# Patient Record
Sex: FEMALE | Race: WHITE | Employment: FULL TIME | URBAN - METROPOLITAN AREA
[De-identification: names, ages, dates, MRNs, and addresses within clinical notes are randomized per-mention and may not be internally consistent; named-entity substitution may affect disease eponyms.]

---

## 2017-06-08 ENCOUNTER — HOSPITAL ENCOUNTER (OUTPATIENT)
Dept: CT IMAGING | Facility: MEDICAL CENTER | Age: 52
Discharge: HOME | End: 2017-06-08
Attending: PSYCHIATRY & NEUROLOGY | Admitting: PSYCHIATRY & NEUROLOGY

## 2017-06-08 DIAGNOSIS — M54.16 RADICULOPATHY OF LUMBAR REGION: ICD-10-CM

## 2017-06-08 DIAGNOSIS — M54.16 LUMBAR RADICULOPATHY: ICD-10-CM

## 2017-06-08 DIAGNOSIS — M47.817 FACET DEGENERATION OF LUMBOSACRAL REGION: ICD-10-CM

## 2018-07-20 ENCOUNTER — OFFICE VISIT (OUTPATIENT)
Dept: FAMILY MEDICINE CLINIC | Age: 53
End: 2018-07-20

## 2018-07-20 VITALS
RESPIRATION RATE: 16 BRPM | SYSTOLIC BLOOD PRESSURE: 126 MMHG | BODY MASS INDEX: 36.48 KG/M2 | HEART RATE: 80 BPM | TEMPERATURE: 99 F | WEIGHT: 185.8 LBS | DIASTOLIC BLOOD PRESSURE: 78 MMHG | HEIGHT: 60 IN

## 2018-07-20 DIAGNOSIS — Z76.89 ENCOUNTER TO ESTABLISH CARE: Primary | ICD-10-CM

## 2018-07-20 DIAGNOSIS — F33.1 MODERATE EPISODE OF RECURRENT MAJOR DEPRESSIVE DISORDER (HCC): ICD-10-CM

## 2018-07-20 DIAGNOSIS — R73.9 ELEVATED BLOOD SUGAR: ICD-10-CM

## 2018-07-20 DIAGNOSIS — F41.9 ANXIETY: ICD-10-CM

## 2018-07-20 DIAGNOSIS — Z12.11 ENCOUNTER FOR HEMOCCULT SCREENING: ICD-10-CM

## 2018-07-20 LAB — HBA1C MFR BLD: 5.6 %

## 2018-07-20 PROCEDURE — G8417 CALC BMI ABV UP PARAM F/U: HCPCS | Performed by: REGISTERED NURSE

## 2018-07-20 PROCEDURE — 99204 OFFICE O/P NEW MOD 45 MIN: CPT | Performed by: REGISTERED NURSE

## 2018-07-20 PROCEDURE — 3017F COLORECTAL CA SCREEN DOC REV: CPT | Performed by: REGISTERED NURSE

## 2018-07-20 PROCEDURE — 96160 PT-FOCUSED HLTH RISK ASSMT: CPT | Performed by: REGISTERED NURSE

## 2018-07-20 PROCEDURE — 4004F PT TOBACCO SCREEN RCVD TLK: CPT | Performed by: REGISTERED NURSE

## 2018-07-20 PROCEDURE — 83036 HEMOGLOBIN GLYCOSYLATED A1C: CPT | Performed by: REGISTERED NURSE

## 2018-07-20 PROCEDURE — G8427 DOCREV CUR MEDS BY ELIG CLIN: HCPCS | Performed by: REGISTERED NURSE

## 2018-07-20 RX ORDER — GABAPENTIN 600 MG/1
600 TABLET ORAL 3 TIMES DAILY
COMMUNITY
End: 2018-08-23

## 2018-07-20 RX ORDER — DIAZEPAM 5 MG/1
5 TABLET ORAL EVERY 6 HOURS PRN
COMMUNITY
End: 2020-06-19

## 2018-07-20 RX ORDER — DIPHENHYDRAMINE HCL 25 MG
25 TABLET ORAL EVERY 6 HOURS PRN
COMMUNITY
End: 2021-01-15 | Stop reason: SDUPTHER

## 2018-07-20 RX ORDER — BACLOFEN 20 MG/1
20 TABLET ORAL 3 TIMES DAILY
COMMUNITY
End: 2018-07-20 | Stop reason: SDUPTHER

## 2018-07-20 RX ORDER — HYDROCODONE BITARTRATE AND ACETAMINOPHEN 5; 325 MG/1; MG/1
1 TABLET ORAL EVERY 6 HOURS PRN
COMMUNITY
End: 2019-01-17 | Stop reason: ALTCHOICE

## 2018-07-20 RX ORDER — PROMETHAZINE HYDROCHLORIDE 25 MG/1
25 TABLET ORAL EVERY 6 HOURS PRN
COMMUNITY
End: 2019-03-07 | Stop reason: SDUPTHER

## 2018-07-20 RX ORDER — LORATADINE 10 MG/1
10 TABLET ORAL DAILY
COMMUNITY
End: 2021-01-15 | Stop reason: SDUPTHER

## 2018-07-20 RX ORDER — BACLOFEN 20 MG/1
20 TABLET ORAL 3 TIMES DAILY
Qty: 90 TABLET | Refills: 2 | Status: SHIPPED | OUTPATIENT
Start: 2018-07-20 | End: 2019-02-14 | Stop reason: SDUPTHER

## 2018-07-20 RX ORDER — PANTOPRAZOLE SODIUM 40 MG/1
40 TABLET, DELAYED RELEASE ORAL DAILY
COMMUNITY
End: 2019-03-28

## 2018-07-20 RX ORDER — HYDROCHLOROTHIAZIDE 25 MG/1
25 TABLET ORAL DAILY
COMMUNITY
End: 2019-06-04 | Stop reason: ALTCHOICE

## 2018-07-20 RX ORDER — IBUPROFEN 800 MG/1
800 TABLET ORAL EVERY 6 HOURS PRN
COMMUNITY
End: 2018-10-19 | Stop reason: SDUPTHER

## 2018-07-20 ASSESSMENT — PATIENT HEALTH QUESTIONNAIRE - PHQ9
2. FEELING DOWN, DEPRESSED OR HOPELESS: 3
1. LITTLE INTEREST OR PLEASURE IN DOING THINGS: 3
10. IF YOU CHECKED OFF ANY PROBLEMS, HOW DIFFICULT HAVE THESE PROBLEMS MADE IT FOR YOU TO DO YOUR WORK, TAKE CARE OF THINGS AT HOME, OR GET ALONG WITH OTHER PEOPLE: 3
SUM OF ALL RESPONSES TO PHQ9 QUESTIONS 1 & 2: 6
4. FEELING TIRED OR HAVING LITTLE ENERGY: 3
3. TROUBLE FALLING OR STAYING ASLEEP: 2
SUM OF ALL RESPONSES TO PHQ QUESTIONS 1-9: 22
8. MOVING OR SPEAKING SO SLOWLY THAT OTHER PEOPLE COULD HAVE NOTICED. OR THE OPPOSITE, BEING SO FIGETY OR RESTLESS THAT YOU HAVE BEEN MOVING AROUND A LOT MORE THAN USUAL: 0
5. POOR APPETITE OR OVEREATING: 3
9. THOUGHTS THAT YOU WOULD BE BETTER OFF DEAD, OR OF HURTING YOURSELF: 3
7. TROUBLE CONCENTRATING ON THINGS, SUCH AS READING THE NEWSPAPER OR WATCHING TELEVISION: 3
6. FEELING BAD ABOUT YOURSELF - OR THAT YOU ARE A FAILURE OR HAVE LET YOURSELF OR YOUR FAMILY DOWN: 2

## 2018-07-20 ASSESSMENT — ENCOUNTER SYMPTOMS
WHEEZING: 0
SHORTNESS OF BREATH: 0
CHEST TIGHTNESS: 0
COUGH: 0

## 2018-07-20 NOTE — PROGRESS NOTES
[facility]  Clinic Note    Date: 7/20/2018                                               Subjective/Objective:     Chief Complaint   Patient presents with    Established New Doctor     NEW PT ESTABLISH [facility], STATES SHE LEFT HER OLD DR, DR Tabby Flores ON BAD TERMS BUT HAS FRIENDS WHO COME HERE.  Anxiety     STATES SHE HAS SEVERE ANXIETY PROBLEMS AND HAS BEEN WITHOUT HER VALIUM FOR 2 WEEKS, SHE HAS A LOT GOING ON AND NOTHING SEEMS TO BE GOING RIGHT.  Diabetes     ROUTINE DM FOLLOW UP, NOT CURRENTLY ON MEDS FOR THIS. USED TO BE ON INVOKANA.  Depression     POSITIVE DEPRESSION SCREENING. STATES HAS BEEN AN ISSUE FOR ABOUT 40 YEARS. HER MOTHER KILLED HERSELF WHEN SHE WAS 5. SHE HAS NO PLANS OF SELF HARM AND FEELS SHE IS HANDLING LIFE AS WELL AS SHE CAN RIGHT NOW.  Other     DECLINES MAMMOGRAM, COLONOSCOPY, AND PAP AT THIS TIME. HPI Patient presents to [facility] care. Works at office jobs on and off,  jobs- unable to work currently due to back pain. Has 4 kids- 5 grand children. Lives alone in an apartment. Is being followed by spine specialist. May need surgery again. Patient has hx of anxiety and depression. Sever anxiety- has been on Valium- has been out for the past 2 weeks- no current signs of withdraw. Depression has been an issue for over 40 years. Mother committed suicide when patient was 5. Patient has no thoughts of hurting self or others. Does not act on them or has a plan. Has a lot going on in her life- needs back surgery again. Having issues with short and long term memory also. Requesting refill on Valium. .    Patient has hx of DM- was previous on Invokana, but not currently. Diabetes Mellitus Type II:  Home blood sugar records: 115-150 between meals. No significant episodes of hypoglycemia. No polyuria, polydipsia, visual changes, foot problems, GI upset. Diabetic diet compliance:  compliant most of the time,  Weight trend: increasing steadily. Current exercise: none. Eye exam current (within one year): no.     No results found for: LABA1C  No results found for: LABMICR, CREATININE  No results found for: ALT, AST  No components found for: CHLPL  No results found for: TRIG  No results found for: HDL  No results found for: LDLCALC    Hypertension:  Denies CP, SOB, visual changes, dizziness, palpitations or HA. She is not adherent to a low sodium diet. Blood pressure typically runs unknown outside of the office- does not check. There are no active problems to display for this patient.       Past Medical History:   Diagnosis Date    Anxiety     Bipolar disorder [facility]     CAD (coronary artery disease)     COPD (chronic obstructive pulmonary disease) ([facility] [facility]     Depression     Diabetes mellitus ([facility] [address] [facility]     Hypertension     Myocardial infarction     PTSD (post-traumatic stress disorder)        Past Surgical History:   Procedure Laterality Date    BACK SURGERY       SECTION      CHOLECYSTECTOMY      SPINAL FUSION      TOTAL HIP ARTHROPLASTY         Admission on 2016, Discharged on 2016   Component Date Value Ref Range Status    Carboxyhemoglobin 2016 7.3* 0.0 - 1.5 % Final    Comment:      0.0-1.5  (Smokers 1.5-5.0)         Family History   Problem Relation Age of Onset    Other Mother         MENTAL HEALTH     Heart Disease Father     Cancer Sister         BREAST        Current Outpatient Prescriptions   Medication Sig Dispense Refill    POTASSIUM CHLORIDE PO Take 10 mg by mouth      BUPROPION HCL PO Take 150 mg by mouth      diclofenac sodium 1 % GEL Apply 2 g topically 2 times daily      diphenhydrAMINE (DIPHENHIST) 25 MG tablet Take 25 mg by mouth every 6 hours as needed for Itching      hydrochlorothiazide (HYDRODIURIL) 25 MG tablet Take 25 mg by mouth daily      Ipratropium-Albuterol (COMBIVENT RESPIMAT IN) Inhale into the lungs      Albuterol (VENTOLIN IN) Inhale into the lungs      Emollient (LIBRA) cream Apply topically as needed for Dry Skin Apply topically as needed.  ibuprofen (ADVIL;MOTRIN) 800 MG tablet Take 800 mg by mouth every 6 hours as needed for Pain      Butalbital-APAP-Caffeine (FIORICET PO) Take by mouth      gabapentin (NEURONTIN) 600 MG tablet Take 600 mg by mouth 3 times daily. Arlice Kava diazepam (VALIUM) 5 MG tablet Take 5 mg by mouth every 6 hours as needed for Anxiety. Arlice Kava Multiple Vitamin (MULTI-VITAMIN PO) Take by mouth      baclofen (LIORESAL) 20 MG tablet Take 20 mg by mouth 3 times daily      promethazine (PHENERGAN) 25 MG tablet Take 25 mg by mouth every 6 hours as needed for Nausea      aspirin 81 MG tablet Take 81 mg by mouth daily      loratadine (CLARITIN) 10 MG tablet Take 10 mg by mouth daily      FERROUS SULFATE PO Take 325 mg by mouth      HYDROcodone-acetaminophen (NORCO) 5-325 MG per tablet Take 1 tablet by mouth every 6 hours as needed for Pain. .       No current facility-administered medications for this visit. Allergies   Allergen Reactions    Abilify [Aripiprazole]     Celebrex [Celecoxib] Swelling    Imdur [Isosorbide Dinitrate]     Lamictal [Lamotrigine] Rash    Ultram [Tramadol] Nausea And Vomiting       Review of Systems   Constitutional: Negative for chills, diaphoresis, fatigue and fever. Respiratory: Negative for cough, chest tightness, shortness of breath and wheezing. Cardiovascular: Negative for chest pain and palpitations. Gastrointestinal: Negative for abdominal distention, abdominal pain, blood in stool, constipation, diarrhea, nausea and vomiting. Musculoskeletal: Positive for back pain (chronic issue). Neurological: Positive for weakness. Negative for dizziness, light-headedness, numbness and headaches. Psychiatric/Behavioral: Positive for dysphoric mood and self-injury. Negative for sleep disturbance and suicidal ideas. The patient is nervous/anxious. All other systems reviewed and are negative.       Vitals:  BP 126/78 (Site: Left Arm, Position: Sitting, Cuff Size: Medium Adult)   Pulse 80   Temp 99 °F (37.2 °C) (Oral)   Resp 16   Ht 5' (1.524 m)   Wt 185 lb 12.8 oz (84.3 kg) Comment: SHOES ON  LMP 03/09/2016 (Approximate)   Breastfeeding? No   BMI 36.29 kg/m²     Physical Exam   Constitutional: She is oriented to person, place, and time. She appears well-developed and well-nourished. HENT:   Head: Normocephalic and atraumatic. Right Ear: Tympanic membrane, external ear and ear canal normal.   Left Ear: Tympanic membrane, external ear and ear canal normal.   Nose: Nose normal.   Mouth/Throat: Uvula is midline, oropharynx is clear and moist and mucous membranes are normal.   Eyes: Conjunctivae and EOM are normal. Pupils are equal, round, and reactive to light. Neck: Normal range of motion. Neck supple. No thyromegaly present. Cardiovascular: Normal rate, regular rhythm, normal heart sounds and intact distal pulses. Pulmonary/Chest: Effort normal and breath sounds normal.   Lymphadenopathy:     She has no cervical adenopathy. Neurological: She is alert and oriented to person, place, and time. Skin: Skin is warm and dry. Psychiatric: She has a normal mood and affect. Her speech is normal and behavior is normal. Judgment and thought content normal.   Nursing note and vitals reviewed. Assessment/Plan     1. Encounter to establish care  Will establish with Dr. Thomasina Angelucci for GYN care. - Sydnie Sabillon MD    2. Moderate episode of recurrent major depressive disorder [facility])  Educated patient that I do not prescribe controlled substances, but scheduled patient with [address] [place] [address] NP for further evaluation and management. Given counseling services handout. Does not need refills on other medications. 3. Anxiety  See above.     4. Elevated blood sugar  A1c is 5.6- will hold on Invokana and other DM medication at this point due to good diet control.  - POCT glycosylated

## 2018-07-28 ASSESSMENT — ENCOUNTER SYMPTOMS
BACK PAIN: 1
NAUSEA: 0
BLOOD IN STOOL: 0
VOMITING: 0
ABDOMINAL PAIN: 0
DIARRHEA: 0
CONSTIPATION: 0
ABDOMINAL DISTENTION: 0

## 2018-08-23 ENCOUNTER — OFFICE VISIT (OUTPATIENT)
Dept: FAMILY MEDICINE CLINIC | Age: 53
End: 2018-08-23

## 2018-08-23 VITALS
BODY MASS INDEX: 37.11 KG/M2 | DIASTOLIC BLOOD PRESSURE: 80 MMHG | WEIGHT: 189 LBS | HEIGHT: 60 IN | SYSTOLIC BLOOD PRESSURE: 124 MMHG | TEMPERATURE: 98.3 F | RESPIRATION RATE: 16 BRPM | HEART RATE: 80 BPM

## 2018-08-23 DIAGNOSIS — Z23 NEED FOR PROPHYLACTIC VACCINATION AND INOCULATION AGAINST VARICELLA: ICD-10-CM

## 2018-08-23 DIAGNOSIS — Z12.31 ENCOUNTER FOR SCREENING MAMMOGRAM FOR BREAST CANCER: ICD-10-CM

## 2018-08-23 DIAGNOSIS — Z13.220 SCREENING FOR HYPERLIPIDEMIA: ICD-10-CM

## 2018-08-23 DIAGNOSIS — Z01.818 PRE-OP EVALUATION: Primary | ICD-10-CM

## 2018-08-23 DIAGNOSIS — M25.512 ACUTE PAIN OF LEFT SHOULDER: ICD-10-CM

## 2018-08-23 DIAGNOSIS — Z13.31 POSITIVE DEPRESSION SCREENING: ICD-10-CM

## 2018-08-23 DIAGNOSIS — Z23 NEED FOR PROPHYLACTIC VACCINATION AGAINST STREPTOCOCCUS PNEUMONIAE (PNEUMOCOCCUS): ICD-10-CM

## 2018-08-23 DIAGNOSIS — K43.2 INCISIONAL HERNIA, WITHOUT OBSTRUCTION OR GANGRENE: ICD-10-CM

## 2018-08-23 PROBLEM — K21.9 GASTROESOPHAGEAL REFLUX DISEASE WITHOUT ESOPHAGITIS: Status: ACTIVE | Noted: 2018-08-23

## 2018-08-23 PROBLEM — F60.9 PERSONALITY DISORDER (HCC): Status: ACTIVE | Noted: 2018-08-23

## 2018-08-23 PROBLEM — M51.379 DEGENERATION OF LUMBOSACRAL INTERVERTEBRAL DISC: Status: ACTIVE | Noted: 2017-04-04

## 2018-08-23 PROBLEM — M47.816 LUMBAR SPONDYLOSIS: Status: ACTIVE | Noted: 2017-07-21

## 2018-08-23 PROBLEM — G57.01: Status: ACTIVE | Noted: 2017-09-12

## 2018-08-23 PROBLEM — F31.9 BIPOLAR DISORDER (HCC): Status: ACTIVE | Noted: 2018-08-23

## 2018-08-23 PROBLEM — M54.16 LUMBAR RADICULOPATHY: Status: ACTIVE | Noted: 2017-04-04

## 2018-08-23 PROBLEM — M47.817 LUMBOSACRAL SPONDYLOSIS WITHOUT MYELOPATHY: Status: ACTIVE | Noted: 2017-04-04

## 2018-08-23 PROBLEM — F41.9 ANXIETY: Status: ACTIVE | Noted: 2018-08-23

## 2018-08-23 PROBLEM — E11.9 DIABETES (HCC): Status: ACTIVE | Noted: 2018-08-23

## 2018-08-23 PROBLEM — I10 HYPERTENSION: Status: ACTIVE | Noted: 2018-08-23

## 2018-08-23 PROBLEM — J41.0 SIMPLE CHRONIC BRONCHITIS (HCC): Status: ACTIVE | Noted: 2018-08-23

## 2018-08-23 PROBLEM — M51.37 DEGENERATION OF LUMBOSACRAL INTERVERTEBRAL DISC: Status: ACTIVE | Noted: 2017-04-04

## 2018-08-23 PROBLEM — M53.3 SACROILIAC JOINT DYSFUNCTION OF LEFT SIDE: Status: ACTIVE | Noted: 2017-01-06

## 2018-08-23 PROBLEM — M51.17 INTERVERTEBRAL DISC DISORDER WITH RADICULOPATHY OF LUMBOSACRAL REGION: Status: ACTIVE | Noted: 2017-07-21

## 2018-08-23 PROBLEM — F43.10 PTSD (POST-TRAUMATIC STRESS DISORDER): Status: ACTIVE | Noted: 2018-08-23

## 2018-08-23 PROBLEM — M54.17 LUMBOSACRAL RADICULOPATHY: Status: ACTIVE | Noted: 2017-09-12

## 2018-08-23 LAB
A/G RATIO: 1.6 (ref 1.1–2.2)
ALBUMIN SERPL-MCNC: 4.6 G/DL (ref 3.4–5)
ALP BLD-CCNC: 137 U/L (ref 40–129)
ALT SERPL-CCNC: 17 U/L (ref 10–40)
ANION GAP SERPL CALCULATED.3IONS-SCNC: 15 MMOL/L (ref 3–16)
APTT: 37 SEC (ref 26–36)
AST SERPL-CCNC: 15 U/L (ref 15–37)
BILIRUB SERPL-MCNC: <0.2 MG/DL (ref 0–1)
BILIRUBIN, POC: NORMAL
BLOOD URINE, POC: NORMAL
BUN BLDV-MCNC: 15 MG/DL (ref 7–20)
CALCIUM SERPL-MCNC: 9.8 MG/DL (ref 8.3–10.6)
CHLORIDE BLD-SCNC: 98 MMOL/L (ref 99–110)
CLARITY, POC: CLEAR
CO2: 25 MMOL/L (ref 21–32)
COLOR, POC: NORMAL
CREAT SERPL-MCNC: 0.9 MG/DL (ref 0.6–1.1)
GFR AFRICAN AMERICAN: >60
GFR NON-AFRICAN AMERICAN: >60
GLOBULIN: 2.9 G/DL
GLUCOSE BLD-MCNC: 87 MG/DL (ref 70–99)
GLUCOSE URINE, POC: NORMAL
INR BLD: 0.94 (ref 0.86–1.14)
KETONES, POC: NORMAL
LEUKOCYTE EST, POC: NORMAL
NITRITE, POC: NORMAL
PH, POC: 7.5
POTASSIUM SERPL-SCNC: 4.6 MMOL/L (ref 3.5–5.1)
PROTEIN, POC: NORMAL
PROTHROMBIN TIME: 10.7 SEC (ref 9.8–13)
SODIUM BLD-SCNC: 138 MMOL/L (ref 136–145)
SPECIFIC GRAVITY, POC: 1.01
TOTAL PROTEIN: 7.5 G/DL (ref 6.4–8.2)
UROBILINOGEN, POC: 0.2

## 2018-08-23 PROCEDURE — 36415 COLL VENOUS BLD VENIPUNCTURE: CPT | Performed by: REGISTERED NURSE

## 2018-08-23 PROCEDURE — G8417 CALC BMI ABV UP PARAM F/U: HCPCS | Performed by: REGISTERED NURSE

## 2018-08-23 PROCEDURE — 81002 URINALYSIS NONAUTO W/O SCOPE: CPT | Performed by: REGISTERED NURSE

## 2018-08-23 PROCEDURE — G8431 POS CLIN DEPRES SCRN F/U DOC: HCPCS | Performed by: REGISTERED NURSE

## 2018-08-23 PROCEDURE — 96160 PT-FOCUSED HLTH RISK ASSMT: CPT | Performed by: REGISTERED NURSE

## 2018-08-23 PROCEDURE — 99244 OFF/OP CNSLTJ NEW/EST MOD 40: CPT | Performed by: REGISTERED NURSE

## 2018-08-23 PROCEDURE — 3017F COLORECTAL CA SCREEN DOC REV: CPT | Performed by: REGISTERED NURSE

## 2018-08-23 PROCEDURE — G8427 DOCREV CUR MEDS BY ELIG CLIN: HCPCS | Performed by: REGISTERED NURSE

## 2018-08-23 ASSESSMENT — PATIENT HEALTH QUESTIONNAIRE - PHQ9
7. TROUBLE CONCENTRATING ON THINGS, SUCH AS READING THE NEWSPAPER OR WATCHING TELEVISION: 3
SUM OF ALL RESPONSES TO PHQ QUESTIONS 1-9: 18
1. LITTLE INTEREST OR PLEASURE IN DOING THINGS: 3
4. FEELING TIRED OR HAVING LITTLE ENERGY: 3
SUM OF ALL RESPONSES TO PHQ QUESTIONS 1-9: 18
6. FEELING BAD ABOUT YOURSELF - OR THAT YOU ARE A FAILURE OR HAVE LET YOURSELF OR YOUR FAMILY DOWN: 3
SUM OF ALL RESPONSES TO PHQ9 QUESTIONS 1 & 2: 6
3. TROUBLE FALLING OR STAYING ASLEEP: 3
2. FEELING DOWN, DEPRESSED OR HOPELESS: 3
8. MOVING OR SPEAKING SO SLOWLY THAT OTHER PEOPLE COULD HAVE NOTICED. OR THE OPPOSITE, BEING SO FIGETY OR RESTLESS THAT YOU HAVE BEEN MOVING AROUND A LOT MORE THAN USUAL: 0
9. THOUGHTS THAT YOU WOULD BE BETTER OFF DEAD, OR OF HURTING YOURSELF: 0
10. IF YOU CHECKED OFF ANY PROBLEMS, HOW DIFFICULT HAVE THESE PROBLEMS MADE IT FOR YOU TO DO YOUR WORK, TAKE CARE OF THINGS AT HOME, OR GET ALONG WITH OTHER PEOPLE: 3
5. POOR APPETITE OR OVEREATING: 0

## 2018-08-23 ASSESSMENT — ENCOUNTER SYMPTOMS
CHEST TIGHTNESS: 0
NAUSEA: 0
SHORTNESS OF BREATH: 0
VOMITING: 0
BACK PAIN: 1
CONSTIPATION: 0
COUGH: 0
WHEEZING: 0
DIARRHEA: 0

## 2018-08-23 NOTE — PROGRESS NOTES
with psychiatrist.    3. Need for prophylactic vaccination against Streptococcus pneumoniae (pneumococcus)  Due.  - pneumococcal 13-valent conjugate (PREVNAR) SUSP inj; Inject 0.5 mLs into the muscle once for 1 dose  Dispense: 0.5 mL; Refill: 0    4. Need for prophylactic vaccination and inoculation against varicella  Due  - zoster recombinant adjuvanted vaccine (SHINGRIX) 50 MCG SUSR injection; Inject 0.5 mLs into the muscle once for 1 dose 50 MCG IM then repeat 2-6 months. Dispense: 1 each; Refill: 1    5. Encounter for screening mammogram for breast cancer  Due.  - [facility] Digital Screen Bilateral [[id number]]; Future    6. Screening for hyperlipidemia  Due.    7. Incisional hernia, without obstruction or gangrere  Suspect incisional hernia from previous surgery. Will follow up with general surgery if gets worse- will wait till after back surgery. 8. Acute pain of left shoulder. Given Rx for Bio med cream #5 and home exercises to complete. Will consider imaging if no relief or pain persists. Orders Placed This Encounter   Procedures    Urine Culture     Standing Status:   Future     Number of Occurrences:   1     Standing Expiration Date:   8/23/2019     Order Specific Question:   Specify (ex-cath, midstream, cysto, etc)? Answer:   MIDSTREAM    MRSA SCREENING CULTURE ONLY    YEMI Digital Screen Bilateral [[id number]]     Standing Status:   Future     Standing Expiration Date:   8/23/2019    Comprehensive Metabolic Panel     Standing Status:   Future     Number of Occurrences:   1     Standing Expiration Date:   8/23/2019    APTT     Standing Status:   Future     Number of Occurrences:   1     Standing Expiration Date:   8/23/2019     Order Specific Question:   Daily Heparin Dose? Answer:   0    Protime-INR     Standing Status:   Future     Number of Occurrences:   1     Standing Expiration Date:   8/23/2019     Order Specific Question:   Daily Coumadin Dose?      Answer:   0    POCT Urinalysis no Micro

## 2018-08-24 LAB — URINE CULTURE, ROUTINE: NORMAL

## 2018-08-24 ASSESSMENT — ENCOUNTER SYMPTOMS
ABDOMINAL PAIN: 1
ABDOMINAL DISTENTION: 0
BLOOD IN STOOL: 0
RECTAL PAIN: 0
ANAL BLEEDING: 0

## 2018-08-25 LAB — MRSA CULTURE ONLY: NORMAL

## 2018-10-19 RX ORDER — IBUPROFEN 800 MG/1
800 TABLET ORAL EVERY 6 HOURS PRN
Qty: 30 TABLET | Refills: 0 | Status: SHIPPED | OUTPATIENT
Start: 2018-10-19 | End: 2019-02-14 | Stop reason: SDUPTHER

## 2018-10-19 RX ORDER — OMEPRAZOLE 40 MG/1
40 CAPSULE, DELAYED RELEASE ORAL DAILY
Qty: 30 CAPSULE | Refills: 0 | Status: SHIPPED | OUTPATIENT
Start: 2018-10-19 | End: 2018-11-18 | Stop reason: SDUPTHER

## 2018-10-19 RX ORDER — ALBUTEROL SULFATE 90 UG/1
2 AEROSOL, METERED RESPIRATORY (INHALATION) EVERY 6 HOURS PRN
Qty: 1 INHALER | Refills: 0 | Status: SHIPPED | OUTPATIENT
Start: 2018-10-19 | End: 2018-11-19 | Stop reason: SDUPTHER

## 2018-10-19 RX ORDER — BUPROPION HYDROCHLORIDE 150 MG/1
150 TABLET ORAL EVERY MORNING
Qty: 30 TABLET | Refills: 0 | Status: SHIPPED | OUTPATIENT
Start: 2018-10-19 | End: 2018-11-18 | Stop reason: SDUPTHER

## 2018-10-19 NOTE — TELEPHONE ENCOUNTER
Patient requesting refills of  Bupropion 150 mg 1 QD  Free style  light test strips # 100 testing tid  Combivent  Diclofenac gel  Ibuprofen 800 mg tid  Omeprazole 40 mg  Rosa cream  Ventolin      All to go to [facility] on [address]

## 2018-11-19 RX ORDER — OMEPRAZOLE 40 MG/1
CAPSULE, DELAYED RELEASE ORAL
Qty: 30 CAPSULE | Refills: 0 | Status: SHIPPED | OUTPATIENT
Start: 2018-11-19 | End: 2019-01-17 | Stop reason: SDUPTHER

## 2018-11-19 RX ORDER — IPRATROPIUM/ALBUTEROL SULFATE 20-100 MCG
1 MIST INHALER (GRAM) INHALATION EVERY 6 HOURS PRN
Qty: 1 INHALER | Refills: 0 | Status: SHIPPED | OUTPATIENT
Start: 2018-11-19 | End: 2019-01-17 | Stop reason: SDUPTHER

## 2018-11-19 RX ORDER — BUPROPION HYDROCHLORIDE 150 MG/1
TABLET ORAL
Qty: 30 TABLET | Refills: 0 | Status: SHIPPED | OUTPATIENT
Start: 2018-11-19 | End: 2019-01-17 | Stop reason: SDUPTHER

## 2019-01-07 ENCOUNTER — TELEPHONE (OUTPATIENT)
Dept: FAMILY MEDICINE CLINIC | Age: 54
End: 2019-01-07

## 2019-01-07 DIAGNOSIS — M47.817 LUMBOSACRAL SPONDYLOSIS WITHOUT MYELOPATHY: Primary | ICD-10-CM

## 2019-01-17 ENCOUNTER — OFFICE VISIT (OUTPATIENT)
Dept: FAMILY MEDICINE CLINIC | Age: 54
End: 2019-01-17
Payer: COMMERCIAL

## 2019-01-17 VITALS
BODY MASS INDEX: 37.11 KG/M2 | WEIGHT: 189 LBS | HEIGHT: 60 IN | DIASTOLIC BLOOD PRESSURE: 74 MMHG | SYSTOLIC BLOOD PRESSURE: 106 MMHG

## 2019-01-17 DIAGNOSIS — M54.16 LUMBAR RADICULOPATHY: ICD-10-CM

## 2019-01-17 DIAGNOSIS — F41.9 ANXIETY: ICD-10-CM

## 2019-01-17 DIAGNOSIS — M54.41 ACUTE BILATERAL LOW BACK PAIN WITH BILATERAL SCIATICA: Primary | ICD-10-CM

## 2019-01-17 DIAGNOSIS — R09.89 THROAT FULLNESS: ICD-10-CM

## 2019-01-17 DIAGNOSIS — E11.69 DIABETES MELLITUS TYPE 2 IN OBESE (HCC): ICD-10-CM

## 2019-01-17 DIAGNOSIS — M54.42 ACUTE BILATERAL LOW BACK PAIN WITH BILATERAL SCIATICA: Primary | ICD-10-CM

## 2019-01-17 DIAGNOSIS — E66.9 DIABETES MELLITUS TYPE 2 IN OBESE (HCC): ICD-10-CM

## 2019-01-17 DIAGNOSIS — F31.70 BIPOLAR AFFECTIVE DISORDER IN REMISSION (HCC): ICD-10-CM

## 2019-01-17 DIAGNOSIS — J43.2 CENTRILOBULAR EMPHYSEMA (HCC): ICD-10-CM

## 2019-01-17 PROCEDURE — 4004F PT TOBACCO SCREEN RCVD TLK: CPT | Performed by: FAMILY MEDICINE

## 2019-01-17 PROCEDURE — G8484 FLU IMMUNIZE NO ADMIN: HCPCS | Performed by: FAMILY MEDICINE

## 2019-01-17 PROCEDURE — 3023F SPIROM DOC REV: CPT | Performed by: FAMILY MEDICINE

## 2019-01-17 PROCEDURE — G8417 CALC BMI ABV UP PARAM F/U: HCPCS | Performed by: FAMILY MEDICINE

## 2019-01-17 PROCEDURE — 99214 OFFICE O/P EST MOD 30 MIN: CPT | Performed by: FAMILY MEDICINE

## 2019-01-17 PROCEDURE — 2022F DILAT RTA XM EVC RTNOPTHY: CPT | Performed by: FAMILY MEDICINE

## 2019-01-17 PROCEDURE — 3017F COLORECTAL CA SCREEN DOC REV: CPT | Performed by: FAMILY MEDICINE

## 2019-01-17 PROCEDURE — 3046F HEMOGLOBIN A1C LEVEL >9.0%: CPT | Performed by: FAMILY MEDICINE

## 2019-01-17 PROCEDURE — G8427 DOCREV CUR MEDS BY ELIG CLIN: HCPCS | Performed by: FAMILY MEDICINE

## 2019-01-17 PROCEDURE — G8926 SPIRO NO PERF OR DOC: HCPCS | Performed by: FAMILY MEDICINE

## 2019-01-17 RX ORDER — OMEPRAZOLE 40 MG/1
CAPSULE, DELAYED RELEASE ORAL
Qty: 30 CAPSULE | Refills: 5 | Status: SHIPPED | OUTPATIENT
Start: 2019-01-17 | End: 2019-03-07

## 2019-01-17 RX ORDER — HYDROCODONE BITARTRATE AND ACETAMINOPHEN 5; 325 MG/1; MG/1
1 TABLET ORAL EVERY 6 HOURS PRN
Qty: 28 TABLET | Refills: 0 | Status: SHIPPED | OUTPATIENT
Start: 2019-01-17 | End: 2019-03-04 | Stop reason: SDUPTHER

## 2019-01-17 RX ORDER — BUPROPION HYDROCHLORIDE 150 MG/1
TABLET ORAL
Qty: 30 TABLET | Refills: 5 | Status: SHIPPED | OUTPATIENT
Start: 2019-01-17 | End: 2019-03-07

## 2019-01-17 RX ORDER — DIAZEPAM 5 MG/1
5 TABLET ORAL EVERY 6 HOURS PRN
Status: CANCELLED | OUTPATIENT
Start: 2019-01-17

## 2019-01-17 RX ORDER — ALBUTEROL SULFATE 90 UG/1
AEROSOL, METERED RESPIRATORY (INHALATION)
Qty: 8.5 INHALER | Refills: 5 | Status: SHIPPED | OUTPATIENT
Start: 2019-01-17 | End: 2019-07-12 | Stop reason: SDUPTHER

## 2019-01-17 RX ORDER — PREDNISONE 10 MG/1
TABLET ORAL
Qty: 25 TABLET | Refills: 0 | Status: SHIPPED | OUTPATIENT
Start: 2019-01-17 | End: 2019-02-26 | Stop reason: ALTCHOICE

## 2019-02-05 RX ORDER — BLOOD-GLUCOSE METER
KIT MISCELLANEOUS
Qty: 100 STRIP | Refills: 0 | OUTPATIENT
Start: 2019-02-05

## 2019-02-14 ENCOUNTER — TELEPHONE (OUTPATIENT)
Dept: FAMILY MEDICINE CLINIC | Age: 54
End: 2019-02-14

## 2019-02-14 RX ORDER — POTASSIUM CHLORIDE 750 MG/1
10 TABLET, EXTENDED RELEASE ORAL DAILY
Qty: 30 TABLET | Refills: 2 | Status: SHIPPED | OUTPATIENT
Start: 2019-02-14 | End: 2019-06-04 | Stop reason: ALTCHOICE

## 2019-02-14 RX ORDER — BACLOFEN 20 MG/1
20 TABLET ORAL 3 TIMES DAILY PRN
Qty: 90 TABLET | Refills: 2 | Status: SHIPPED | OUTPATIENT
Start: 2019-02-14 | End: 2019-05-15

## 2019-02-14 RX ORDER — IBUPROFEN 800 MG/1
800 TABLET ORAL EVERY 8 HOURS PRN
Qty: 90 TABLET | Refills: 2 | Status: SHIPPED | OUTPATIENT
Start: 2019-02-14 | End: 2019-06-04 | Stop reason: SDUPTHER

## 2019-02-26 ENCOUNTER — OFFICE VISIT (OUTPATIENT)
Dept: ENT CLINIC | Age: 54
End: 2019-02-26
Payer: COMMERCIAL

## 2019-02-26 VITALS — HEART RATE: 71 BPM | SYSTOLIC BLOOD PRESSURE: 124 MMHG | DIASTOLIC BLOOD PRESSURE: 68 MMHG | OXYGEN SATURATION: 96 %

## 2019-02-26 DIAGNOSIS — R13.19 ESOPHAGEAL DYSPHAGIA: Primary | ICD-10-CM

## 2019-02-26 DIAGNOSIS — G47.33 OBSTRUCTIVE SLEEP APNEA SYNDROME: ICD-10-CM

## 2019-02-26 DIAGNOSIS — J30.9 ALLERGIC SINUSITIS: ICD-10-CM

## 2019-02-26 PROCEDURE — G8427 DOCREV CUR MEDS BY ELIG CLIN: HCPCS | Performed by: OTOLARYNGOLOGY

## 2019-02-26 PROCEDURE — G8417 CALC BMI ABV UP PARAM F/U: HCPCS | Performed by: OTOLARYNGOLOGY

## 2019-02-26 PROCEDURE — G8484 FLU IMMUNIZE NO ADMIN: HCPCS | Performed by: OTOLARYNGOLOGY

## 2019-02-26 PROCEDURE — 3017F COLORECTAL CA SCREEN DOC REV: CPT | Performed by: OTOLARYNGOLOGY

## 2019-02-26 PROCEDURE — 4004F PT TOBACCO SCREEN RCVD TLK: CPT | Performed by: OTOLARYNGOLOGY

## 2019-02-26 PROCEDURE — 99203 OFFICE O/P NEW LOW 30 MIN: CPT | Performed by: OTOLARYNGOLOGY

## 2019-02-26 RX ORDER — MONTELUKAST SODIUM 10 MG/1
10 TABLET ORAL NIGHTLY
Qty: 30 TABLET | Refills: 1 | Status: SHIPPED | OUTPATIENT
Start: 2019-02-26 | End: 2019-04-27 | Stop reason: SDUPTHER

## 2019-02-26 ASSESSMENT — ENCOUNTER SYMPTOMS
SINUS PRESSURE: 0
VOICE CHANGE: 0
SORE THROAT: 1
RESPIRATORY NEGATIVE: 1
TROUBLE SWALLOWING: 1
EYES NEGATIVE: 1
ALLERGIC/IMMUNOLOGIC NEGATIVE: 1
FACIAL SWELLING: 0
SINUS PAIN: 0
RHINORRHEA: 1

## 2019-03-04 ENCOUNTER — TELEPHONE (OUTPATIENT)
Dept: ORTHOPEDIC SURGERY | Age: 54
End: 2019-03-04

## 2019-03-04 ENCOUNTER — OFFICE VISIT (OUTPATIENT)
Dept: ORTHOPEDIC SURGERY | Age: 54
End: 2019-03-04
Payer: COMMERCIAL

## 2019-03-04 VITALS
DIASTOLIC BLOOD PRESSURE: 91 MMHG | HEART RATE: 84 BPM | BODY MASS INDEX: 37.69 KG/M2 | WEIGHT: 192 LBS | SYSTOLIC BLOOD PRESSURE: 129 MMHG | HEIGHT: 60 IN

## 2019-03-04 DIAGNOSIS — M25.512 LEFT SHOULDER PAIN, UNSPECIFIED CHRONICITY: Primary | ICD-10-CM

## 2019-03-04 DIAGNOSIS — M75.102 TEAR OF LEFT ROTATOR CUFF, UNSPECIFIED TEAR EXTENT: ICD-10-CM

## 2019-03-04 PROCEDURE — 3017F COLORECTAL CA SCREEN DOC REV: CPT | Performed by: ORTHOPAEDIC SURGERY

## 2019-03-04 PROCEDURE — 4004F PT TOBACCO SCREEN RCVD TLK: CPT | Performed by: ORTHOPAEDIC SURGERY

## 2019-03-04 PROCEDURE — G8427 DOCREV CUR MEDS BY ELIG CLIN: HCPCS | Performed by: ORTHOPAEDIC SURGERY

## 2019-03-04 PROCEDURE — 99203 OFFICE O/P NEW LOW 30 MIN: CPT | Performed by: ORTHOPAEDIC SURGERY

## 2019-03-04 PROCEDURE — G8417 CALC BMI ABV UP PARAM F/U: HCPCS | Performed by: ORTHOPAEDIC SURGERY

## 2019-03-04 PROCEDURE — G8484 FLU IMMUNIZE NO ADMIN: HCPCS | Performed by: ORTHOPAEDIC SURGERY

## 2019-03-04 RX ORDER — HYDROCODONE BITARTRATE AND ACETAMINOPHEN 5; 325 MG/1; MG/1
1 TABLET ORAL EVERY 6 HOURS PRN
Qty: 28 TABLET | Refills: 0 | Status: SHIPPED | OUTPATIENT
Start: 2019-03-04 | End: 2019-03-25 | Stop reason: SDUPTHER

## 2019-03-07 ENCOUNTER — TELEPHONE (OUTPATIENT)
Dept: FAMILY MEDICINE CLINIC | Age: 54
End: 2019-03-07

## 2019-03-07 ENCOUNTER — OFFICE VISIT (OUTPATIENT)
Dept: FAMILY MEDICINE CLINIC | Age: 54
End: 2019-03-07
Payer: COMMERCIAL

## 2019-03-07 VITALS
HEART RATE: 74 BPM | BODY MASS INDEX: 38.28 KG/M2 | SYSTOLIC BLOOD PRESSURE: 114 MMHG | DIASTOLIC BLOOD PRESSURE: 72 MMHG | RESPIRATION RATE: 16 BRPM | WEIGHT: 195 LBS | HEIGHT: 60 IN

## 2019-03-07 DIAGNOSIS — G89.29 CHRONIC LEFT SHOULDER PAIN: ICD-10-CM

## 2019-03-07 DIAGNOSIS — E78.00 HYPERCHOLESTEREMIA: ICD-10-CM

## 2019-03-07 DIAGNOSIS — L30.9 ECZEMA, UNSPECIFIED TYPE: ICD-10-CM

## 2019-03-07 DIAGNOSIS — F51.04 CHRONIC INSOMNIA: ICD-10-CM

## 2019-03-07 DIAGNOSIS — R61 NIGHT SWEATS: ICD-10-CM

## 2019-03-07 DIAGNOSIS — Z86.2 HISTORY OF IRON DEFICIENCY ANEMIA: ICD-10-CM

## 2019-03-07 DIAGNOSIS — F32.4 MAJOR DEPRESSIVE DISORDER IN PARTIAL REMISSION, UNSPECIFIED WHETHER RECURRENT (HCC): Primary | ICD-10-CM

## 2019-03-07 DIAGNOSIS — M25.512 CHRONIC LEFT SHOULDER PAIN: ICD-10-CM

## 2019-03-07 DIAGNOSIS — R11.0 NAUSEA: ICD-10-CM

## 2019-03-07 DIAGNOSIS — K08.89 ODONTALGIA: ICD-10-CM

## 2019-03-07 LAB
A/G RATIO: 1.5 (ref 1.1–2.2)
ALBUMIN SERPL-MCNC: 4.4 G/DL (ref 3.4–5)
ALP BLD-CCNC: 142 U/L (ref 40–129)
ALT SERPL-CCNC: 20 U/L (ref 10–40)
AMPHETAMINE SCREEN, URINE: ABNORMAL
ANION GAP SERPL CALCULATED.3IONS-SCNC: 16 MMOL/L (ref 3–16)
AST SERPL-CCNC: 20 U/L (ref 15–37)
BARBITURATE SCREEN, URINE: ABNORMAL
BASOPHILS ABSOLUTE: 0.1 K/UL (ref 0–0.2)
BASOPHILS RELATIVE PERCENT: 1.3 %
BENZODIAZEPINE SCREEN, URINE: ABNORMAL
BILIRUB SERPL-MCNC: 0.3 MG/DL (ref 0–1)
BUN BLDV-MCNC: 12 MG/DL (ref 7–20)
BUPRENORPHINE URINE: ABNORMAL
CALCIUM SERPL-MCNC: 10 MG/DL (ref 8.3–10.6)
CHLORIDE BLD-SCNC: 100 MMOL/L (ref 99–110)
CHOLESTEROL, TOTAL: 261 MG/DL (ref 0–199)
CO2: 24 MMOL/L (ref 21–32)
COCAINE METABOLITE SCREEN URINE: ABNORMAL
CREAT SERPL-MCNC: <0.5 MG/DL (ref 0.6–1.1)
EOSINOPHILS ABSOLUTE: 0.3 K/UL (ref 0–0.6)
EOSINOPHILS RELATIVE PERCENT: 3.8 %
FOLLICLE STIMULATING HORMONE: 77.3 MIU/ML
GABAPENTIN SCREEN, URINE: ABNORMAL
GFR AFRICAN AMERICAN: >60
GFR NON-AFRICAN AMERICAN: >60
GLOBULIN: 2.9 G/DL
GLUCOSE BLD-MCNC: 98 MG/DL (ref 70–99)
HCT VFR BLD CALC: 43.7 % (ref 36–48)
HDLC SERPL-MCNC: 62 MG/DL (ref 40–60)
HEMOGLOBIN: 14.3 G/DL (ref 12–16)
LDL CHOLESTEROL CALCULATED: 167 MG/DL
LYMPHOCYTES ABSOLUTE: 2.9 K/UL (ref 1–5.1)
LYMPHOCYTES RELATIVE PERCENT: 40.7 %
MCH RBC QN AUTO: 30.3 PG (ref 26–34)
MCHC RBC AUTO-ENTMCNC: 32.8 G/DL (ref 31–36)
MCV RBC AUTO: 92.4 FL (ref 80–100)
MDMA URINE: ABNORMAL
METHADONE SCREEN, URINE: ABNORMAL
METHAMPHETAMINE, URINE: ABNORMAL
MONOCYTES ABSOLUTE: 0.5 K/UL (ref 0–1.3)
MONOCYTES RELATIVE PERCENT: 7.4 %
NEUTROPHILS ABSOLUTE: 3.3 K/UL (ref 1.7–7.7)
NEUTROPHILS RELATIVE PERCENT: 46.8 %
OPIATE SCREEN URINE: ABNORMAL
OXYCODONE SCREEN URINE: ABNORMAL
PDW BLD-RTO: 14.5 % (ref 12.4–15.4)
PHENCYCLIDINE SCREEN URINE: ABNORMAL
PLATELET # BLD: 317 K/UL (ref 135–450)
PMV BLD AUTO: 8.4 FL (ref 5–10.5)
POTASSIUM SERPL-SCNC: 4.4 MMOL/L (ref 3.5–5.1)
PROPOXYPHENE SCREEN, URINE: ABNORMAL
RBC # BLD: 4.73 M/UL (ref 4–5.2)
SODIUM BLD-SCNC: 140 MMOL/L (ref 136–145)
THC SCREEN, URINE: ABNORMAL
TOTAL PROTEIN: 7.3 G/DL (ref 6.4–8.2)
TRICYCLIC ANTIDEPRESSANTS, UR: ABNORMAL
TRIGL SERPL-MCNC: 160 MG/DL (ref 0–150)
TSH REFLEX: 2.28 UIU/ML (ref 0.27–4.2)
VLDLC SERPL CALC-MCNC: 32 MG/DL
WBC # BLD: 7.1 K/UL (ref 4–11)

## 2019-03-07 PROCEDURE — G8484 FLU IMMUNIZE NO ADMIN: HCPCS | Performed by: FAMILY MEDICINE

## 2019-03-07 PROCEDURE — 36415 COLL VENOUS BLD VENIPUNCTURE: CPT | Performed by: FAMILY MEDICINE

## 2019-03-07 PROCEDURE — 80305 DRUG TEST PRSMV DIR OPT OBS: CPT | Performed by: FAMILY MEDICINE

## 2019-03-07 PROCEDURE — G8427 DOCREV CUR MEDS BY ELIG CLIN: HCPCS | Performed by: FAMILY MEDICINE

## 2019-03-07 PROCEDURE — 3017F COLORECTAL CA SCREEN DOC REV: CPT | Performed by: FAMILY MEDICINE

## 2019-03-07 PROCEDURE — 99214 OFFICE O/P EST MOD 30 MIN: CPT | Performed by: FAMILY MEDICINE

## 2019-03-07 PROCEDURE — G8417 CALC BMI ABV UP PARAM F/U: HCPCS | Performed by: FAMILY MEDICINE

## 2019-03-07 PROCEDURE — 4004F PT TOBACCO SCREEN RCVD TLK: CPT | Performed by: FAMILY MEDICINE

## 2019-03-07 RX ORDER — PROMETHAZINE HYDROCHLORIDE 25 MG/1
25 TABLET ORAL EVERY 6 HOURS PRN
Qty: 12 TABLET | Refills: 0 | Status: SHIPPED | OUTPATIENT
Start: 2019-03-07 | End: 2020-07-31 | Stop reason: ALTCHOICE

## 2019-03-07 RX ORDER — BUPROPION HYDROCHLORIDE 300 MG/1
TABLET ORAL
Qty: 30 TABLET | Refills: 2 | Status: SHIPPED | OUTPATIENT
Start: 2019-03-07 | End: 2019-05-30 | Stop reason: SDUPTHER

## 2019-03-07 RX ORDER — PENICILLIN V POTASSIUM 500 MG/1
500 TABLET ORAL 3 TIMES DAILY
Qty: 30 TABLET | Refills: 0 | Status: SHIPPED | OUTPATIENT
Start: 2019-03-07 | End: 2019-03-17

## 2019-03-07 NOTE — TELEPHONE ENCOUNTER
CHRIS,     CALLED PT TO ADVISE OF LIST, OFFERED TO EMAIL OR PUT AT . PT STATED SHE DOESN'T ACTUALLY NEED IT BECAUSE SHES NOT GOING TO SEE ANYONE ANYWAY. CAN CLOSE AFTER YOU READ.

## 2019-03-18 ENCOUNTER — TELEPHONE (OUTPATIENT)
Dept: FAMILY MEDICINE CLINIC | Age: 54
End: 2019-03-18

## 2019-03-19 ENCOUNTER — HOSPITAL ENCOUNTER (OUTPATIENT)
Dept: MRI IMAGING | Age: 54
Discharge: HOME | End: 2019-03-19
Payer: COMMERCIAL

## 2019-03-19 DIAGNOSIS — M75.102 TEAR OF LEFT ROTATOR CUFF, UNSPECIFIED TEAR EXTENT: ICD-10-CM

## 2019-03-19 PROCEDURE — 73221 MRI JOINT UPR EXTREM W/O DYE: CPT

## 2019-03-20 ENCOUNTER — TELEPHONE (OUTPATIENT)
Dept: FAMILY MEDICINE CLINIC | Age: 54
End: 2019-03-20

## 2019-03-25 ENCOUNTER — OFFICE VISIT (OUTPATIENT)
Dept: ORTHOPEDIC SURGERY | Age: 54
End: 2019-03-25
Payer: COMMERCIAL

## 2019-03-25 VITALS
WEIGHT: 194 LBS | SYSTOLIC BLOOD PRESSURE: 123 MMHG | DIASTOLIC BLOOD PRESSURE: 88 MMHG | HEART RATE: 85 BPM | HEIGHT: 60 IN | BODY MASS INDEX: 38.09 KG/M2

## 2019-03-25 DIAGNOSIS — M67.912 TENDINOPATHY OF LEFT ROTATOR CUFF: ICD-10-CM

## 2019-03-25 DIAGNOSIS — M75.02 ADHESIVE CAPSULITIS OF LEFT SHOULDER: Primary | ICD-10-CM

## 2019-03-25 PROCEDURE — 99999 PR OFFICE/OUTPT VISIT,PROCEDURE ONLY: CPT | Performed by: ORTHOPAEDIC SURGERY

## 2019-03-25 PROCEDURE — 20610 DRAIN/INJ JOINT/BURSA W/O US: CPT | Performed by: ORTHOPAEDIC SURGERY

## 2019-03-25 RX ORDER — TRIAMCINOLONE ACETONIDE 40 MG/ML
80 INJECTION, SUSPENSION INTRA-ARTICULAR; INTRAMUSCULAR ONCE
Status: COMPLETED | OUTPATIENT
Start: 2019-03-25 | End: 2019-03-25

## 2019-03-25 RX ORDER — HYDROCODONE BITARTRATE AND ACETAMINOPHEN 5; 325 MG/1; MG/1
1 TABLET ORAL EVERY 6 HOURS PRN
Qty: 28 TABLET | Refills: 0 | Status: SHIPPED | OUTPATIENT
Start: 2019-03-25 | End: 2019-04-01

## 2019-03-25 RX ADMIN — TRIAMCINOLONE ACETONIDE 80 MG: 40 INJECTION, SUSPENSION INTRA-ARTICULAR; INTRAMUSCULAR at 17:36

## 2019-03-28 ENCOUNTER — TELEPHONE (OUTPATIENT)
Dept: FAMILY MEDICINE CLINIC | Age: 54
End: 2019-03-28

## 2019-03-28 RX ORDER — PANTOPRAZOLE SODIUM 40 MG/1
40 TABLET, DELAYED RELEASE ORAL
Qty: 30 TABLET | Refills: 2 | Status: SHIPPED | OUTPATIENT
Start: 2019-03-28 | End: 2019-06-04 | Stop reason: SDUPTHER

## 2019-03-30 PROBLEM — M67.912 TENDINOPATHY OF LEFT ROTATOR CUFF: Status: ACTIVE | Noted: 2019-03-30

## 2019-04-09 ENCOUNTER — TELEPHONE (OUTPATIENT)
Dept: FAMILY MEDICINE CLINIC | Age: 54
End: 2019-04-09

## 2019-04-09 DIAGNOSIS — M54.16 LUMBAR RADICULOPATHY: Primary | ICD-10-CM

## 2019-04-09 RX ORDER — PREGABALIN 75 MG/1
CAPSULE ORAL
Qty: 90 CAPSULE | Refills: 0 | Status: SHIPPED | OUTPATIENT
Start: 2019-04-09 | End: 2019-06-04 | Stop reason: SDUPTHER

## 2019-04-09 NOTE — TELEPHONE ENCOUNTER
Printed medicine to start for leg pain - lyrica at starting dose, BUT, pt needs to give uds prior to taking script and can have only if uds c/w rx'd meds.   Will need to f/u w/ me in next 4 weeks prior to further refills  oarrs reviewed and results c/w rx'd meds

## 2019-04-09 NOTE — TELEPHONE ENCOUNTER
LAST UDS WAS BAD, BENZOS AND THC WERE IN HER URINE ON 3/8 PATIENT IS ALREADY ON NORCO. Riley Guerrero

## 2019-04-09 NOTE — TELEPHONE ENCOUNTER
Patient needs medication for pain - both legs - cause by nerve damage. She has tried several different medication and none of them are working. She would like to try Isabel Sanchez. Please give her a call back.

## 2019-04-10 NOTE — TELEPHONE ENCOUNTER
Okay - whenever, just needs uds that is correct for prescribed meds only, prior to getting rx for lyrica

## 2019-04-10 NOTE — TELEPHONE ENCOUNTER
SPOKE WITH PATIENT. SHE IS NOT ABLE TO GET HERE TODAY. SHE WILL TRY TO GET TRANSPORTATION FOR TOMORROW OR Friday. IF DR LOPEZ IS OK WITH THAT. PATIENT WILL CALL INSURANCE TO VERIFY MED COVERAGE  [facility]

## 2019-04-10 NOTE — TELEPHONE ENCOUNTER
CALLED PT AND ADVISED SHE WILL HAVE TO LEAVE UDS PRIOR DUE TO ABNORMAL DRUG SCREEN. SHE WILL TRY TO MAKE IT IN HERE ON Friday. Iwona Molina

## 2019-04-22 ENCOUNTER — TELEPHONE (OUTPATIENT)
Dept: BARIATRICS/WEIGHT MGMT | Age: 54
End: 2019-04-22

## 2019-04-22 NOTE — TELEPHONE ENCOUNTER
LM for patient to call back. Patient attended seminar 4-9-19 with Dr. Khoa Cobb. She DOES have BWLS coverage with [facility] (6mo diet, BMI >40)  BMI Form scanned in media.  Thanks

## 2019-04-23 ENCOUNTER — TELEPHONE (OUTPATIENT)
Dept: INTERNAL MEDICINE CLINIC | Age: 54
End: 2019-04-23

## 2019-04-24 ENCOUNTER — TELEPHONE (OUTPATIENT)
Dept: INTERNAL MEDICINE CLINIC | Age: 54
End: 2019-04-24

## 2019-04-24 NOTE — TELEPHONE ENCOUNTER
PA SUBMITTED FOR Bevespi Aerosphere 9-4. 8MCG/ACT IN AERO  Da Silva: Radha Brady - Rx #: [id number]  STATUS: PENDING

## 2019-04-27 DIAGNOSIS — J30.9 ALLERGIC SINUSITIS: ICD-10-CM

## 2019-04-29 RX ORDER — MONTELUKAST SODIUM 10 MG/1
TABLET ORAL
Qty: 30 TABLET | Refills: 1 | Status: SHIPPED | OUTPATIENT
Start: 2019-04-29 | End: 2019-06-04 | Stop reason: SDUPTHER

## 2019-05-30 ENCOUNTER — TELEPHONE (OUTPATIENT)
Dept: BARIATRICS/WEIGHT MGMT | Age: 54
End: 2019-05-30

## 2019-05-30 NOTE — TELEPHONE ENCOUNTER
Called as a new pt courtesy call - spoke w patient. Did receive paperwork - told patient to have new pt paperwork completely filled out, insurance card, and id and to arrive at appt time. If they didn't have the paperwork filled out and arrive on time may be rescheduled.  Told ff location

## 2019-06-04 ENCOUNTER — OFFICE VISIT (OUTPATIENT)
Dept: FAMILY MEDICINE CLINIC | Age: 54
End: 2019-06-04
Payer: COMMERCIAL

## 2019-06-04 VITALS
DIASTOLIC BLOOD PRESSURE: 72 MMHG | WEIGHT: 198 LBS | SYSTOLIC BLOOD PRESSURE: 126 MMHG | BODY MASS INDEX: 38.87 KG/M2 | RESPIRATION RATE: 14 BRPM | HEIGHT: 60 IN | HEART RATE: 82 BPM

## 2019-06-04 DIAGNOSIS — E66.9 DIABETES MELLITUS TYPE 2 IN OBESE (HCC): ICD-10-CM

## 2019-06-04 DIAGNOSIS — M54.16 LUMBAR RADICULOPATHY: ICD-10-CM

## 2019-06-04 DIAGNOSIS — M47.817 LUMBOSACRAL SPONDYLOSIS WITHOUT MYELOPATHY: ICD-10-CM

## 2019-06-04 DIAGNOSIS — J43.2 CENTRILOBULAR EMPHYSEMA (HCC): ICD-10-CM

## 2019-06-04 DIAGNOSIS — J30.9 ALLERGIC SINUSITIS: ICD-10-CM

## 2019-06-04 DIAGNOSIS — Z12.11 COLON CANCER SCREENING: ICD-10-CM

## 2019-06-04 DIAGNOSIS — M54.17 LUMBOSACRAL RADICULOPATHY: ICD-10-CM

## 2019-06-04 DIAGNOSIS — E11.69 DIABETES MELLITUS TYPE 2 IN OBESE (HCC): ICD-10-CM

## 2019-06-04 DIAGNOSIS — I10 ESSENTIAL HYPERTENSION: ICD-10-CM

## 2019-06-04 DIAGNOSIS — Z79.899 LONG-TERM USE OF HIGH-RISK MEDICATION: Primary | ICD-10-CM

## 2019-06-04 DIAGNOSIS — K21.9 GASTROESOPHAGEAL REFLUX DISEASE WITHOUT ESOPHAGITIS: ICD-10-CM

## 2019-06-04 PROCEDURE — 3046F HEMOGLOBIN A1C LEVEL >9.0%: CPT | Performed by: FAMILY MEDICINE

## 2019-06-04 PROCEDURE — 2022F DILAT RTA XM EVC RTNOPTHY: CPT | Performed by: FAMILY MEDICINE

## 2019-06-04 PROCEDURE — 4004F PT TOBACCO SCREEN RCVD TLK: CPT | Performed by: FAMILY MEDICINE

## 2019-06-04 PROCEDURE — 3017F COLORECTAL CA SCREEN DOC REV: CPT | Performed by: FAMILY MEDICINE

## 2019-06-04 PROCEDURE — 3023F SPIROM DOC REV: CPT | Performed by: FAMILY MEDICINE

## 2019-06-04 PROCEDURE — G8417 CALC BMI ABV UP PARAM F/U: HCPCS | Performed by: FAMILY MEDICINE

## 2019-06-04 PROCEDURE — 99214 OFFICE O/P EST MOD 30 MIN: CPT | Performed by: FAMILY MEDICINE

## 2019-06-04 PROCEDURE — G8427 DOCREV CUR MEDS BY ELIG CLIN: HCPCS | Performed by: FAMILY MEDICINE

## 2019-06-04 PROCEDURE — G8926 SPIRO NO PERF OR DOC: HCPCS | Performed by: FAMILY MEDICINE

## 2019-06-04 RX ORDER — PREGABALIN 75 MG/1
CAPSULE ORAL
Qty: 90 CAPSULE | Refills: 0 | Status: SHIPPED | OUTPATIENT
Start: 2019-06-04 | End: 2019-07-22 | Stop reason: SDUPTHER

## 2019-06-04 RX ORDER — CYCLOBENZAPRINE HCL 10 MG
10 TABLET ORAL 3 TIMES DAILY PRN
Qty: 90 TABLET | Refills: 2 | Status: SHIPPED | OUTPATIENT
Start: 2019-06-04 | End: 2019-09-27 | Stop reason: SDUPTHER

## 2019-06-04 RX ORDER — EPINEPHRINE 0.3 MG/.3ML
INJECTION SUBCUTANEOUS
Qty: 2 EACH | Refills: 1 | Status: SHIPPED | OUTPATIENT
Start: 2019-06-04

## 2019-06-04 RX ORDER — MONTELUKAST SODIUM 10 MG/1
TABLET ORAL
Qty: 30 TABLET | Refills: 5 | Status: SHIPPED | OUTPATIENT
Start: 2019-06-04 | End: 2021-01-15 | Stop reason: SDUPTHER

## 2019-06-04 RX ORDER — DULOXETIN HYDROCHLORIDE 30 MG/1
CAPSULE, DELAYED RELEASE ORAL
Qty: 30 CAPSULE | Refills: 0 | Status: SHIPPED | OUTPATIENT
Start: 2019-06-04 | End: 2019-09-27 | Stop reason: ALTCHOICE

## 2019-06-04 RX ORDER — DULOXETIN HYDROCHLORIDE 60 MG/1
60 CAPSULE, DELAYED RELEASE ORAL DAILY
Qty: 30 CAPSULE | Refills: 3 | Status: SHIPPED | OUTPATIENT
Start: 2019-06-04 | End: 2021-01-15 | Stop reason: ALTCHOICE

## 2019-06-04 RX ORDER — PANTOPRAZOLE SODIUM 40 MG/1
40 TABLET, DELAYED RELEASE ORAL
Qty: 30 TABLET | Refills: 5 | Status: SHIPPED | OUTPATIENT
Start: 2019-06-04 | End: 2020-01-15

## 2019-06-04 RX ORDER — IBUPROFEN 800 MG/1
800 TABLET ORAL EVERY 8 HOURS PRN
Qty: 90 TABLET | Refills: 2 | Status: SHIPPED | OUTPATIENT
Start: 2019-06-04 | End: 2020-02-17 | Stop reason: SDUPTHER

## 2019-06-04 NOTE — PROGRESS NOTES
Subjective:      Patient ID: Adrián Stephen is a 48 y.o. female. HPI  Chief Complaint   Patient presents with    Other     DM NEEDS TO BE REMOVED FROM PT PROBLEM LIST    Pain     DOES NOT WANT TO USE BACLOFEN ANY LONGER WOULD LIKE TO HAVE A REFILL ON FLEXERIL      BP Readings from Last 3 Encounters:   19 126/72   19 123/88   19 114/72     Pulse Readings from Last 3 Encounters:   19 82   19 85   19 74     Wt Readings from Last 3 Encounters:   19 198 lb (89.8 kg)   19 194 lb (88 kg)   19 195 lb (88.5 kg)   A LOT OF NERVE PAIN  WENT BACK TO WORK BUT LOST JOB IN 2 WEEKS - FEELS CONFUSED - ? RELATED TO PAIN VS. MEDS - MEMORY IS SHOT  Forgets things easily. Can't keep job -   Did yard work yesterday - a lot of pain today in both legs/ shoulders and low back - nerve pain constant in legs - hips to toes - burning sensation in feet - weird pain. Past Surgical History:   Procedure Laterality Date    BACK SURGERY       SECTION      CHOLECYSTECTOMY      HIP ARTHROSCOPY Left     SPINAL FUSION      end of 2018 hardware removed - spinal fusion    sx down leg have switched to other side since 2 surgeries. Seeing psychiatrist  Never started lyrica - doesn't tolerate gabapentin well. Couldn't tolerate wellbutrin 300 - 150 didn't help  Doesn't remember why stopped effexor - ? Dry mouth  Review of Systems  Allergies bad but singulair/ zyrtec help  Quit drinking in past  Estrogen patch helping. Objective:   Physical Exam   Constitutional: She is oriented to person, place, and time. She appears well-developed and well-nourished. No distress. HENT:   Head: Normocephalic and atraumatic. Eyes: Conjunctivae are normal. No scleral icterus. Cardiovascular: Normal rate, regular rhythm, normal heart sounds and intact distal pulses. No murmur heard. Pulmonary/Chest: Effort normal and breath sounds normal. No respiratory distress.  She has no wheezes. She has no rales. Abdominal: Soft. Bowel sounds are normal. She exhibits no distension. There is no tenderness. Musculoskeletal: She exhibits no edema. No ttp low back   Neurological: She is alert and oriented to person, place, and time. Skin: Skin is warm and dry. Psychiatric: She has a normal mood and affect. Assessment:       Diagnosis Orders   1. Long-term use of high-risk medication  POCT Rapid Drug Screen   2. Allergic sinusitis  montelukast (SINGULAIR) 10 MG tablet   3. Lumbar radiculopathy     4. Diabetes mellitus type 2 in obese ([address]     5. Lumbosacral spondylosis without myelopathy     6. Lumbosacral radiculopathy     7. Gastroesophageal reflux disease without esophagitis     8. Essential hypertension     9. Centrilobular emphysema ([address]     10. Colon cancer screening             Plan:      DIABETES IN REMISSION  REFER TO WEIGHT LOSS SPECIALIST    Start lyrica - se d/w pt  Start cymbalta for mood/ pain - in lieu of wellbutrin - options/ se d/ w pt  Zyrtec/ singulair  combivent prn for copd  Diclofenac gel topically  Refer weight loss specialist for possible surgical tx  combipatch helping  bp stable on hctz  mmg regularly encouraged and pap encouraged as well as colon ca screen.   Kelby Zaragoza MD

## 2019-06-06 ENCOUNTER — OFFICE VISIT (OUTPATIENT)
Dept: BARIATRICS/WEIGHT MGMT | Age: 54
End: 2019-06-06
Payer: COMMERCIAL

## 2019-06-06 VITALS
OXYGEN SATURATION: 97 % | BODY MASS INDEX: 39.19 KG/M2 | HEART RATE: 70 BPM | SYSTOLIC BLOOD PRESSURE: 138 MMHG | HEIGHT: 60 IN | RESPIRATION RATE: 18 BRPM | DIASTOLIC BLOOD PRESSURE: 86 MMHG | WEIGHT: 199.6 LBS

## 2019-06-06 DIAGNOSIS — K21.9 CHRONIC GERD: ICD-10-CM

## 2019-06-06 DIAGNOSIS — E78.2 MIXED HYPERLIPIDEMIA: ICD-10-CM

## 2019-06-06 DIAGNOSIS — Z01.818 PRE-OPERATIVE CLEARANCE: ICD-10-CM

## 2019-06-06 DIAGNOSIS — E66.9 DIABETES MELLITUS TYPE 2 IN OBESE (HCC): ICD-10-CM

## 2019-06-06 DIAGNOSIS — E66.01 SEVERE OBESITY (BMI 35.0-39.9) WITH COMORBIDITY (HCC): Primary | ICD-10-CM

## 2019-06-06 DIAGNOSIS — E11.69 DIABETES MELLITUS TYPE 2 IN OBESE (HCC): ICD-10-CM

## 2019-06-06 DIAGNOSIS — I10 ESSENTIAL HYPERTENSION: ICD-10-CM

## 2019-06-06 DIAGNOSIS — R06.83 SNORING: ICD-10-CM

## 2019-06-06 PROCEDURE — 3046F HEMOGLOBIN A1C LEVEL >9.0%: CPT | Performed by: SURGERY

## 2019-06-06 PROCEDURE — 2022F DILAT RTA XM EVC RTNOPTHY: CPT | Performed by: SURGERY

## 2019-06-06 PROCEDURE — 3017F COLORECTAL CA SCREEN DOC REV: CPT | Performed by: SURGERY

## 2019-06-06 PROCEDURE — 4004F PT TOBACCO SCREEN RCVD TLK: CPT | Performed by: SURGERY

## 2019-06-06 PROCEDURE — 99205 OFFICE O/P NEW HI 60 MIN: CPT | Performed by: SURGERY

## 2019-06-06 PROCEDURE — G8417 CALC BMI ABV UP PARAM F/U: HCPCS | Performed by: SURGERY

## 2019-06-06 PROCEDURE — G8427 DOCREV CUR MEDS BY ELIG CLIN: HCPCS | Performed by: SURGERY

## 2019-06-06 NOTE — PROGRESS NOTES
Orlando Londono is a 48 y.o. female with a date of birth of 1965. Vitals:    06/06/19 1032   BP: 138/86   Pulse: 70   Resp: 18   SpO2: 97%    BMI: Body mass index is 39.64 kg/m². Obesity Classification: Class II    Weight History: Wt Readings from Last 3 Encounters:   06/06/19 199 lb 9.6 oz (90.5 kg)   06/04/19 198 lb (89.8 kg)   03/25/19 194 lb (88 kg)       Patient's lowest adult weight was 110 lbs at age 29. Patient's highest adult weight was 198 lbs, current. Patient has participated in the following weight loss programs: Atkins Diet, Weight Watcher Anonymous, Cabbage Soup Diet, metabolife, low fat, low carb, and grapefruit. Patient has not participated in meal replacement/liquid diets. Patient has participated in weight loss medications. - Adipex    Patient is not lactose intolerant. Patient does not have Nondenominational/cultural food concerns. Patient does not have food allergies. Pt can tolerate artificial sweeteners. Patient dines out to a sit down restaurant 4 times per year. Patient dines out to a fast food restaurant 2 times per year. 24 hour recall/food frequency chart:  Breakfast: no.   Snack: no.   Lunch: no.   Snack: no.   Dinner: yes. 9pm frozen pizza (1/3 of pizza)  Snack: no.   Drinks throughout the day: diet coke, coffee with cream and stevia, limited water intake  Do you drink alcohol? No.     Patient does not meet the criteria for binge eating disorder. Patient does not have grazing. Patient does not have night eating. Patient does have a history of emotional eating or eating out of boredom. It does take an unusually large amount of food for the patient to feel comfortably full. Most days the patient eats until she is full. Patient describes level of activity as sedentary d/t back problems. Surgery  Patient's greatest concern about having surgery is: None. Patient describes the motivation for weight loss surgery to be:  To improve quality of life.    Patient does not feel confident in her ability to make these changes - mainly giving up smoking. She feels she can make dietary changes. The patient's expectations of post-surgical eating habits are realistic. Patient states she does understand the consequences of not complying with post-op food guidelines. Patient states she understands the long term changes in food intake that will be necessary for all occasions after surgery for the rest of her life. she does understand the long term food changes. Patient is not deemed nutritionally appropriate to proceed unless she makes significant changes. Goals  Weight: 130#  Health Improvement: less pain, better movement, reducing medications    Assessment  Nutritional Needs: RMR=(9.99 x 90.5) + (6.25 x 151.1) - (4.92 x 53 y.o.) -161= 1437 kcal x 1.4 (sedentary/light activity factor)= 2010 kcal - 500 (for 1 lb weight loss/week)= 1510 kcal.    Plan  Surgical procedure desired: bypass, to discuss with MD    Plan/Recommendations: General weight loss/lifestyle modification strategies discussed (elicit support from others; identify saboteurs; non-food rewards, etc). Goals:   -Eat 4-5 times daily  -Avoid high fat and high sugar foods  -Include protein with all meals and snacks  -Avoid carbonation and caffeine  -Avoid calorie containing beverages  -Increase physical activity as tolerated    PES Statement:  Overweight/Obesity related to lack of exercise, sedentary lifestyle, unhealthy eating habits, and unsuccessful diet attempts as evidenced by BMI. Body mass index is 39.64 kg/m². Will follow up as necessary.     Ally Ballesteros

## 2019-06-06 NOTE — PROGRESS NOTES
[facility]) Physicians   [employer] [facility] [employer]  Sergio Olea MD, [address] [facility], [address] [place] [address]    [place]  [address] . Phone: [phone]  Fax: [phone]       Chief Complaint   Patient presents with    Obesity     NP, ED, Shaan           HPI:    Sydnee Brooks is a very pleasant 48 y.o. obese female ,   Body mass index is 39.64 kg/m². And multiple medical problems who is presenting for weight loss surgery evaluation and consultation by Dr. Bib Amaya. Patient has been struggling for several years now with obesity. Patient feels the weight is an obstacle to achieve and perform things in daily living as well risk on health. Tries to diet, and exercise but can't keep the weight off. Patient tried Atkins Diet, Weight Watcher Anonymous, Cabbage Soup Diet, metabolife, low fat, low carb, and grapefruit. Patient has not participated in meal replacement/liquid diets.     Patient has participated in weight loss medications. - Adipex and other regimens, but with no sustainable weight loss. Patient  is very determined to lose weight and be healthy, and is interested in surgical weight loss to achieve this goal.    Otherwise patient denies any nausea, vomiting, fevers, chills, shortness of breath, chest pain, constipation or urinary symptoms.         Pain Assessment   Denies any abdominal pain     Past Medical History:   Diagnosis Date    Anxiety     Asthma     Bipolar disorder ([facility] [address]     CAD (coronary artery disease)     Chronic back pain     COPD (chronic obstructive pulmonary disease) (HCC)     Depression     Diabetes mellitus (HCC)     GERD (gastroesophageal reflux disease)     Hypertension     Mixed hyperlipidemia     Myocardial infarction ([address]     PTSD (post-traumatic stress disorder)      Past Surgical History:   Procedure Laterality Date    BACK SURGERY       SECTION      CHOLECYSTECTOMY      HIP ARTHROSCOPY Left     SPINAL FUSION      end of august 2018 hardware removed - spinal fusion 7/17     Family History   Problem Relation Age of Onset    Other Mother         MENTAL HEALTH     Alcohol Abuse Mother     Mental Illness Mother     Heart Disease Father     Hypertension Father     Elevated Lipids Father     Alcohol Abuse Father     Cancer Sister         BREAST     Kidney Disease Daughter      Social History     Tobacco Use    Smoking status: Current Every Day Smoker     Packs/day: 0.50     Years: 35.00     Pack years: 17.50     Types: Cigarettes    Smokeless tobacco: Never Used   Substance Use Topics    Alcohol use: No     Comment: Sober for 2 years- hx of alcoholism     I counseled the patient on the importance of not smoking and risks of ETOH. Allergies   Allergen Reactions    Abilify [Aripiprazole]     Celebrex [Celecoxib] Swelling    Gabapentin Other (See Comments)     MAKES HER FEEL LIKE SHE IS OUT OF IT, CANNOT FOCUS     Imdur [Isosorbide Dinitrate]     Lamictal [Lamotrigine] Rash    Ultram [Tramadol] Nausea And Vomiting     Vitals:    06/06/19 1032   BP: 138/86   Pulse: 70   Resp: 18   SpO2: 97%   Weight: 199 lb 9.6 oz (90.5 kg)   Height: 4' 11.5\" (1.511 m)       Body mass index is 39.64 kg/m². Current Outpatient Medications:     montelukast (SINGULAIR) 10 MG tablet, TAKE 1 TABLET BY MOUTH EVERY DAY AT NIGHT, Disp: 30 tablet, Rfl: 5    pantoprazole (PROTONIX) 40 MG tablet, Take 1 tablet by mouth every morning (before breakfast), Disp: 30 tablet, Rfl: 5    Emollient (LIBRA) cream, Apply topically as needed for Dry Skin Apply topically as needed. , Disp: 1 Bottle, Rfl: 5    diclofenac sodium 1 % GEL, Apply 2 g topically 2 times daily, Disp: 100 g, Rfl: 0    albuterol-ipratropium (COMBIVENT RESPIMAT)  MCG/ACT AERS inhaler, Inhale 1 puff into the lungs every 6 hours as needed for Wheezing, Disp: 1 Inhaler, Rfl: 5    blood glucose test strips (FREESTYLE LITE) strip, TESTING 3 TIMES A DAY FOR HYPERGYLCEMIA, Disp: 100 each, Rfl: 5    ibuprofen (ADVIL;MOTRIN) 800 MG tablet, Take 1 tablet by mouth every 8 hours as needed for Pain, Disp: 90 tablet, Rfl: 2    pregabalin (LYRICA) 75 MG capsule, 1 po bid for 3-5 days then 1 po tid for 3-5 days then 2 po bid, Disp: 90 capsule, Rfl: 0    DULoxetine (CYMBALTA) 60 MG extended release capsule, Take 1 capsule by mouth daily, Disp: 30 capsule, Rfl: 3    cyclobenzaprine (FLEXERIL) 10 MG tablet, Take 1 tablet by mouth 3 times daily as needed for Muscle spasms, Disp: 90 tablet, Rfl: 2    EPINEPHrine (EPIPEN) 0.3 MG/0.3ML SOAJ injection, Use as directed for allergic reaction, Disp: 2 each, Rfl: 1    estradiol-norethindrone (COMBIPATCH) 0.05-0.25 MG/DAY, Place 1 patch onto the skin Twice a Week, Disp: 8 patch, Rfl: 3    promethazine (PHENERGAN) 25 MG tablet, Take 1 tablet by mouth every 6 hours as needed for Nausea, Disp: 12 tablet, Rfl: 0    albuterol sulfate  (90 Base) MCG/ACT inhaler, INHALE 2 PUFFS BY MOUTH EVERY 6 HOURS AS NEEDED FOR WHEEZE, Disp: 8.5 Inhaler, Rfl: 5    glycopyrrolate-formoterol (BEVESPI) 9-4.8 MCG/ACT AERO, Inhale 2 puffs into the lungs 2 times daily, Disp: 1 Inhaler, Rfl: 5    diphenhydrAMINE (DIPHENHIST) 25 MG tablet, Take 25 mg by mouth every 6 hours as needed for Itching, Disp: , Rfl:     diazepam (VALIUM) 5 MG tablet, Take 5 mg by mouth every 6 hours as needed for Anxiety. ., Disp: , Rfl:     Multiple Vitamin (MULTI-VITAMIN PO), Take by mouth, Disp: , Rfl:     aspirin 81 MG tablet, Take 81 mg by mouth daily, Disp: , Rfl:     loratadine (CLARITIN) 10 MG tablet, Take 10 mg by mouth daily, Disp: , Rfl:     FERROUS SULFATE PO, Take 325 mg by mouth, Disp: , Rfl:     DULoxetine (CYMBALTA) 30 MG extended release capsule, 1 po daily for 1 day then 2 po daily, Disp: 30 capsule, Rfl: 0      Review of Systems - History obtained from the patient  General ROS: overweight   Psychological ROS: negative  Ophthalmic ROS: negative  Neurological ROS: negative  ENT ROS: negative  Allergy and Immunology ROS: negative  Hematological and Lymphatic ROS: negative  Endocrine ROS: overweight  Breast ROS: negative  Respiratory ROS: negative  Cardiovascular ROS: negative  Gastrointestinal ROS:negative  Genito-Urinary ROS: negative  Musculoskeletal ROS: weight effects on joints  Skin ROS: negative    Physical Exam   Constitutional: Patient is oriented to person, place, and time. Vital signs are normal. Patient  appears well-developed and well-nourished. Patient  is active and cooperative. Non-toxic appearance. No distress. HENT:   Head: Normocephalic and atraumatic. Head is without laceration. Right Ear: External ear normal. No lacerations. No drainage, swelling or tenderness. Left Ear: External ear normal. No lacerations. No drainage, swelling or tenderness. Nose: Nose normal. No nose lacerations or nasal deformity. Mouth/Throat: Uvula is midline, oropharynx is clear and moist and mucous membranes are normal. No oropharyngeal exudate. Eyes: Conjunctivae, EOM and lids are normal. Pupils are equal, round, and reactive to light. Right eye exhibits no discharge. No foreign body present in the right eye. Left eye exhibits no discharge. No foreign body present in the left eye. No scleral icterus. Neck: Trachea normal and normal range of motion. Neck supple. No JVD present. No tracheal tenderness present. Carotid bruit is not present. No rigidity. No tracheal deviation and no edema present. No thyromegaly present. Cardiovascular: Normal rate, regular rhythm, normal heart sounds, intact distal pulses and normal pulses. Pulmonary/Chest: Effort normal and breath sounds normal. No stridor. No respiratory distress. Patient  has no wheezes. Patient has no rales. Patient exhibits no tenderness and no crepitus. Abdominal: Soft. Normal appearance and bowel sounds are normal. Patient exhibits no distension, no abdominal bruit, no ascites and no mass.  There is no and TIBC; Future  -     Lipid Panel; Future  -     TSH with Reflex; Future  -     Vitamin A; Future  -     Vitamin B1, Whole Blood; Future  -     Vitamin B12 & Folate; Future  -     Vitamin D 25 Hydroxy; Future  -     Vitamin E; Future  -     Vitamin K1; Future  -     Ambulatory referral to Sleep Medicine  -     Spirometry With Bronchodilator; Future  -     [facility]    Mixed hyperlipidemia  -     Ambulatory referral to Cardiology  -     CBC Auto Differential; Future  -     Comprehensive Metabolic Panel; Future  -     Hemoglobin A1C; Future  -     Iron and TIBC; Future  -     Lipid Panel; Future  -     TSH with Reflex; Future  -     Vitamin A; Future  -     Vitamin B1, Whole Blood; Future  -     Vitamin B12 & Folate; Future  -     Vitamin D 25 Hydroxy; Future  -     Vitamin E; Future  -     Vitamin K1; Future  -     Ambulatory referral to Sleep Medicine  -     Spirometry With Bronchodilator; Future  -     [facility] Mun[facility], [facility]    Snoring  -     Ambulatory referral to Cardiology  -     CBC Auto Differential; Future  -     Comprehensive Metabolic Panel; Future  -     Hemoglobin A1C; Future  -     Iron and TIBC; Future  -     Lipid Panel; Future  -     TSH with Reflex; Future  -     Vitamin A; Future  -     Vitamin B1, Whole Blood; Future  -     Vitamin B12 & Folate; Future  -     Vitamin D 25 Hydroxy; Future  -     Vitamin E; Future  -     Vitamin K1; Future  -     Ambulatory referral to Sleep Medicine  -     Spirometry With Bronchodilator; Future  -     Erik Munoz MD, [facility]          A/P  Aman Brenner is a very pleasant 48 y.o. female with Obesity,  Body mass index is 39.64 kg/m². and multiple obesity related co-morbidities. Aman Brenner is very motivated to lose weight and being more healthy.    We discussed how her weight affects her overall health including:  Patient Active Problem loss planning       Patient Instructions   Patient received dietary handouts and education. Labs ordered. Psych Evaluation. Cardiac Clearance. Pulmonary Clearance. Pulmonary Functions Test.   Sleep Study & CPAP Compliance. UltraSound Gallbladder. EGD (Upper Endoscopy). Support Group. PCP Letter. Quit Smoking,  Alcohol, Caffeine and Carbonated Drinks  Weight History 2 yrs. F/U in 4 weeks. F/U with Behaviorist.   Psychiatrist Padmini Yuen Clearance            Patient advised that its their responsibility to follow up for studies, referrals and/or labs ordered today.

## 2019-06-06 NOTE — PATIENT INSTRUCTIONS
Patient received dietary handouts and education. Labs ordered. Psych Evaluation. Cardiac Clearance. Pulmonary Clearance. Pulmonary Functions Test.   Sleep Study & CPAP Compliance. UltraSound Gallbladder. EGD (Upper Endoscopy). Support Group. PCP Letter. Quit Smoking,  Alcohol, Caffeine and Carbonated Drinks  Weight History 2 yrs. F/U in 4 weeks. F/U with Behaviorist.   Psychiatrist Padmini Yuen Clearance            Patient advised that its their responsibility to follow up for studies, referrals and/or labs ordered today.

## 2019-06-14 ENCOUNTER — INITIAL CONSULT (OUTPATIENT)
Dept: BARIATRICS/WEIGHT MGMT | Age: 54
End: 2019-06-14
Payer: COMMERCIAL

## 2019-06-14 DIAGNOSIS — Z65.5: ICD-10-CM

## 2019-06-14 DIAGNOSIS — Z71.89 INDIVIDUAL COUNSELING ENCOUNTER: Primary | ICD-10-CM

## 2019-06-14 PROBLEM — Z56.82: Status: ACTIVE | Noted: 2019-06-14

## 2019-06-14 PROCEDURE — 96150 PR HEAL & BEHAV ASSESS,EA 15 MIN,INIT: CPT | Performed by: SOCIAL WORKER

## 2019-06-14 NOTE — PROGRESS NOTES
limits the amount of physical activity I can achieve. My depression also limits me because it makes me unmotivated at times. I really need to gain employment outside of the house so I can make money and leave the home more often\". Goals      Make Healther Lifestyle choices      -cut down on caffeine  -start eating small breakfasts as often as possible and work up to breakfast daily  -take dog on walk every other day or daily  -start tracking food via food journal, tracking evelyn, etc           Pt did seem apprehensive towards behaviorist at first, but towards end of assessment states she is willing to come back and work on goals. 45 minutes was spent in assessment and direct counseling with patient.      [facility]

## 2019-06-26 ENCOUNTER — HOSPITAL ENCOUNTER (OUTPATIENT)
Age: 54
Discharge: HOME | End: 2019-06-26
Payer: COMMERCIAL

## 2019-06-26 ENCOUNTER — HOSPITAL ENCOUNTER (OUTPATIENT)
Dept: PULMONOLOGY | Age: 54
Discharge: HOME | End: 2019-06-26
Payer: COMMERCIAL

## 2019-06-26 VITALS — OXYGEN SATURATION: 97 % | HEART RATE: 75 BPM | RESPIRATION RATE: 18 BRPM

## 2019-06-26 DIAGNOSIS — E66.9 DIABETES MELLITUS TYPE 2 IN OBESE (HCC): ICD-10-CM

## 2019-06-26 DIAGNOSIS — R06.83 SNORING: ICD-10-CM

## 2019-06-26 DIAGNOSIS — E11.69 DIABETES MELLITUS TYPE 2 IN OBESE (HCC): ICD-10-CM

## 2019-06-26 DIAGNOSIS — K21.9 CHRONIC GERD: ICD-10-CM

## 2019-06-26 DIAGNOSIS — I10 ESSENTIAL HYPERTENSION: ICD-10-CM

## 2019-06-26 DIAGNOSIS — E78.2 MIXED HYPERLIPIDEMIA: ICD-10-CM

## 2019-06-26 DIAGNOSIS — E66.01 SEVERE OBESITY (BMI 35.0-39.9) WITH COMORBIDITY (HCC): ICD-10-CM

## 2019-06-26 DIAGNOSIS — Z01.818 PRE-OPERATIVE CLEARANCE: ICD-10-CM

## 2019-06-26 LAB
A/G RATIO: 1.3 (ref 1.1–2.2)
ALBUMIN SERPL-MCNC: 4.4 G/DL (ref 3.4–5)
ALP BLD-CCNC: 119 U/L (ref 40–129)
ALT SERPL-CCNC: 13 U/L (ref 10–40)
ANION GAP SERPL CALCULATED.3IONS-SCNC: 15 MMOL/L (ref 3–16)
AST SERPL-CCNC: 16 U/L (ref 15–37)
BASOPHILS ABSOLUTE: 0.1 K/UL (ref 0–0.2)
BASOPHILS RELATIVE PERCENT: 0.8 %
BILIRUB SERPL-MCNC: 0.3 MG/DL (ref 0–1)
BUN BLDV-MCNC: 14 MG/DL (ref 7–20)
CALCIUM SERPL-MCNC: 9.5 MG/DL (ref 8.3–10.6)
CHLORIDE BLD-SCNC: 101 MMOL/L (ref 99–110)
CHOLESTEROL, TOTAL: 285 MG/DL (ref 0–199)
CO2: 21 MMOL/L (ref 21–32)
CREAT SERPL-MCNC: 0.6 MG/DL (ref 0.6–1.1)
EOSINOPHILS ABSOLUTE: 0.3 K/UL (ref 0–0.6)
EOSINOPHILS RELATIVE PERCENT: 3.6 %
EXPIRATORY TIME-POST: NORMAL SEC
EXPIRATORY TIME: NORMAL SEC
FEF 25-75% %CHNG: NORMAL
FEF 25-75% %PRED-POST: NORMAL %
FEF 25-75% %PRED-PRE: NORMAL L/SEC
FEF 25-75% PRED: NORMAL L/SEC
FEF 25-75%-POST: NORMAL L/SEC
FEF 25-75%-PRE: NORMAL L/SEC
FEV1 %PRED-POST: 0 %
FEV1 %PRED-PRE: 84 %
FEV1 PRED: NORMAL L
FEV1-POST: NORMAL L
FEV1-PRE: NORMAL L
FEV1/FVC %PRED-POST: NORMAL %
FEV1/FVC %PRED-PRE: NORMAL %
FEV1/FVC PRED: NORMAL %
FEV1/FVC-POST: 0 %
FEV1/FVC-PRE: 76 %
FOLATE: 16.73 NG/ML (ref 4.78–24.2)
FVC %PRED-POST: NORMAL L
FVC %PRED-PRE: NORMAL %
FVC PRED: NORMAL L
FVC-POST: NORMAL L
FVC-PRE: NORMAL L
GFR AFRICAN AMERICAN: >60
GFR NON-AFRICAN AMERICAN: >60
GLOBULIN: 3.3 G/DL
GLUCOSE BLD-MCNC: 93 MG/DL (ref 70–99)
HCT VFR BLD CALC: 41.9 % (ref 36–48)
HDLC SERPL-MCNC: 46 MG/DL (ref 40–60)
HEMOGLOBIN: 14.4 G/DL (ref 12–16)
IRON SATURATION: 21 % (ref 15–50)
IRON: 70 UG/DL (ref 37–145)
LDL CHOLESTEROL CALCULATED: 204 MG/DL
LYMPHOCYTES ABSOLUTE: 2.8 K/UL (ref 1–5.1)
LYMPHOCYTES RELATIVE PERCENT: 34.5 %
MCH RBC QN AUTO: 31.4 PG (ref 26–34)
MCHC RBC AUTO-ENTMCNC: 34.3 G/DL (ref 31–36)
MCV RBC AUTO: 91.7 FL (ref 80–100)
MONOCYTES ABSOLUTE: 0.5 K/UL (ref 0–1.3)
MONOCYTES RELATIVE PERCENT: 6.3 %
NEUTROPHILS ABSOLUTE: 4.5 K/UL (ref 1.7–7.7)
NEUTROPHILS RELATIVE PERCENT: 54.8 %
PDW BLD-RTO: 14.9 % (ref 12.4–15.4)
PEF %PRED-POST: NORMAL %
PEF %PRED-PRE: NORMAL L/SEC
PEF PRED: NORMAL L/SEC
PEF%CHNG: NORMAL
PEF-POST: NORMAL L/SEC
PEF-PRE: NORMAL L/SEC
PLATELET # BLD: 307 K/UL (ref 135–450)
PMV BLD AUTO: 8.7 FL (ref 5–10.5)
POTASSIUM SERPL-SCNC: 4.2 MMOL/L (ref 3.5–5.1)
RBC # BLD: 4.57 M/UL (ref 4–5.2)
SODIUM BLD-SCNC: 137 MMOL/L (ref 136–145)
TOTAL IRON BINDING CAPACITY: 327 UG/DL (ref 260–445)
TOTAL PROTEIN: 7.7 G/DL (ref 6.4–8.2)
TRIGL SERPL-MCNC: 175 MG/DL (ref 0–150)
TSH REFLEX: 1.23 UIU/ML (ref 0.27–4.2)
VITAMIN B-12: 471 PG/ML (ref 211–911)
VITAMIN D 25-HYDROXY: 17.4 NG/ML
VLDLC SERPL CALC-MCNC: 35 MG/DL
WBC # BLD: 8.2 K/UL (ref 4–11)

## 2019-06-26 PROCEDURE — 85025 COMPLETE CBC W/AUTO DIFF WBC: CPT

## 2019-06-26 PROCEDURE — 82306 VITAMIN D 25 HYDROXY: CPT

## 2019-06-26 PROCEDURE — 84597 ASSAY OF VITAMIN K: CPT

## 2019-06-26 PROCEDURE — 36415 COLL VENOUS BLD VENIPUNCTURE: CPT

## 2019-06-26 PROCEDURE — 94760 N-INVAS EAR/PLS OXIMETRY 1: CPT

## 2019-06-26 PROCEDURE — 80053 COMPREHEN METABOLIC PANEL: CPT

## 2019-06-26 PROCEDURE — 84446 ASSAY OF VITAMIN E: CPT

## 2019-06-26 PROCEDURE — 82746 ASSAY OF FOLIC ACID SERUM: CPT

## 2019-06-26 PROCEDURE — 84425 ASSAY OF VITAMIN B-1: CPT

## 2019-06-26 PROCEDURE — 83036 HEMOGLOBIN GLYCOSYLATED A1C: CPT

## 2019-06-26 PROCEDURE — 94010 BREATHING CAPACITY TEST: CPT

## 2019-06-26 PROCEDURE — 83550 IRON BINDING TEST: CPT

## 2019-06-26 PROCEDURE — 80061 LIPID PANEL: CPT

## 2019-06-26 PROCEDURE — 83540 ASSAY OF IRON: CPT

## 2019-06-26 PROCEDURE — 82607 VITAMIN B-12: CPT

## 2019-06-26 PROCEDURE — 84590 ASSAY OF VITAMIN A: CPT

## 2019-06-26 PROCEDURE — 84443 ASSAY THYROID STIM HORMONE: CPT

## 2019-06-26 ASSESSMENT — PULMONARY FUNCTION TESTS
FEV1_PERCENT_PREDICTED_PRE: 84
FEV1_PERCENT_PREDICTED_POST: 0
FEV1/FVC_PRE: 76
FEV1/FVC_POST: 0

## 2019-06-27 LAB
ESTIMATED AVERAGE GLUCOSE: 119.8 MG/DL
HBA1C MFR BLD: 5.8 %

## 2019-06-27 NOTE — PROCEDURES
Pulmonary Function Testing      Patient name:  Aman Brenner     [facility] Unit #:   [id number]   Date of test:  6/26/2019  Date of interpretation:   6/27/2019    Ms. Aman Brenner is a 48y.o. year-old current smoker. The spirometry data were acceptable and reproducible. Spirometry:  Flow volume loops were normal. The FEV-1/FVC ratio was normal. The FEV-1 was 2.0 liters (84% of predicted), which was normal. The FVC was 2.63 liters (87% of predicted), which was normal. Response to inhaled bronchodilators (albuterol) was not performed. Lung volumes:  Lung volumes were not tested by plethysmography. Diffusion capacity was  not tested. Interpretation:  Normal spirometry.     Comments:

## 2019-06-29 LAB
ALPHA-TOCOPHEROL: 11.1 MG/L (ref 5.5–18)
GAMMA-TOCOPHEROL: 3.3 MG/L (ref 0–6)
RETINYL PALMITATE: 0.02 MG/L (ref 0–0.1)
VITAMIN A LEVEL: 0.48 MG/L (ref 0.3–1.2)
VITAMIN A, INTERP: NORMAL
VITAMIN K1: 1.63 NMOL/L (ref 0.22–4.88)

## 2019-06-30 LAB — VITAMIN B1 WHOLE BLOOD: 118 NMOL/L (ref 70–180)

## 2019-07-01 ENCOUNTER — OFFICE VISIT (OUTPATIENT)
Dept: BARIATRICS/WEIGHT MGMT | Age: 54
End: 2019-07-01
Payer: COMMERCIAL

## 2019-07-01 VITALS
WEIGHT: 199.6 LBS | OXYGEN SATURATION: 93 % | DIASTOLIC BLOOD PRESSURE: 64 MMHG | BODY MASS INDEX: 39.19 KG/M2 | RESPIRATION RATE: 16 BRPM | SYSTOLIC BLOOD PRESSURE: 98 MMHG | HEART RATE: 80 BPM | HEIGHT: 60 IN

## 2019-07-01 DIAGNOSIS — E78.2 MIXED HYPERLIPIDEMIA: ICD-10-CM

## 2019-07-01 DIAGNOSIS — E55.9 VITAMIN D DEFICIENCY: ICD-10-CM

## 2019-07-01 DIAGNOSIS — E66.01 SEVERE OBESITY (BMI 35.0-39.9) WITH COMORBIDITY (HCC): Primary | ICD-10-CM

## 2019-07-01 DIAGNOSIS — E11.69 DIABETES MELLITUS TYPE 2 IN OBESE (HCC): ICD-10-CM

## 2019-07-01 DIAGNOSIS — E66.9 DIABETES MELLITUS TYPE 2 IN OBESE (HCC): ICD-10-CM

## 2019-07-01 DIAGNOSIS — I10 ESSENTIAL HYPERTENSION: ICD-10-CM

## 2019-07-01 DIAGNOSIS — R06.83 SNORING: ICD-10-CM

## 2019-07-01 DIAGNOSIS — K21.9 CHRONIC GERD: ICD-10-CM

## 2019-07-01 PROCEDURE — 3017F COLORECTAL CA SCREEN DOC REV: CPT | Performed by: NURSE PRACTITIONER

## 2019-07-01 PROCEDURE — G8417 CALC BMI ABV UP PARAM F/U: HCPCS | Performed by: NURSE PRACTITIONER

## 2019-07-01 PROCEDURE — 2022F DILAT RTA XM EVC RTNOPTHY: CPT | Performed by: NURSE PRACTITIONER

## 2019-07-01 PROCEDURE — G8427 DOCREV CUR MEDS BY ELIG CLIN: HCPCS | Performed by: NURSE PRACTITIONER

## 2019-07-01 PROCEDURE — 4004F PT TOBACCO SCREEN RCVD TLK: CPT | Performed by: NURSE PRACTITIONER

## 2019-07-01 PROCEDURE — 3044F HG A1C LEVEL LT 7.0%: CPT | Performed by: NURSE PRACTITIONER

## 2019-07-01 PROCEDURE — 99213 OFFICE O/P EST LOW 20 MIN: CPT | Performed by: NURSE PRACTITIONER

## 2019-07-01 RX ORDER — ERGOCALCIFEROL 1.25 MG/1
50000 CAPSULE ORAL WEEKLY
Qty: 12 CAPSULE | Refills: 0 | Status: SHIPPED | OUTPATIENT
Start: 2019-07-01 | End: 2020-05-18 | Stop reason: ALTCHOICE

## 2019-07-01 ASSESSMENT — ENCOUNTER SYMPTOMS
RESPIRATORY NEGATIVE: 1
GASTROINTESTINAL NEGATIVE: 1
EYES NEGATIVE: 1

## 2019-07-01 NOTE — PROGRESS NOTES
[address]  Physicians  [employer] [facility] [employer]    Subjective:      Patient ID: Matty Cook is a 48 y. o.female    HPI    Presurgery    Matty Cook is a very pleasant 48 y.o. female with Body mass index is 39.64 kg/m². Manish Patrick She is still smoking, but cutting back. She is aware that she will need to be nicotine free for at least 2 months prior to scheduling her surgery. She is seeing our behaviorist and finding this helpful. Past Medical History:   Diagnosis Date    Anxiety     Asthma     B[facility] disorder ([facility] [address]     CAD (coronary artery disease)     Chronic back pain     COPD (chronic obstructive pulmonary disease) (HCC)     Depression     Diabetes mellitus (HCC)     GERD (gastroesophageal reflux disease)     Hypertension     Mixed hyperlipidemia     Myocardial infarction ([facility] [address]     PTSD (post-traumatic stress disorder)      Past Surgical History:   Procedure Laterality Date    BACK SURGERY       SECTION      CHOLECYSTECTOMY      HIP ARTHROSCOPY Left     SPINAL FUSION      end of 2018 hardware removed - spinal fusion         Family History   Problem Relation Age of Onset    Other Mother         MENTAL HEALTH     Alcohol Abuse Mother     Mental Illness Mother     Heart Disease Father     Hypertension Father     Elevated Lipids Father     Alcohol Abuse Father     Cancer Sister         BREAST     Kidney Disease Daughter      Social History     Tobacco Use    Smoking status: Current Every Day Smoker     Packs/day: 0.50     Years: 35.00     Pack years: 17.50     Types: Cigarettes    Smokeless tobacco: Never Used   Substance Use Topics    Alcohol use: No     Comment: Sober for 2 years- hx of alcoholism     I counseled the patient on the importance of not smoking and risks of ETOH.       Allergies   Allergen Reactions    Abilify [Aripiprazole]     Celebrex [Celecoxib] Swelling    Gabapentin Other (See Comments)     MAKES HER FEEL LIKE SHE IS OUT OF IT,

## 2019-07-01 NOTE — PATIENT INSTRUCTIONS
calories. Baking, broiling, or grilling meats add flavor without unhealthy fats. Using cooking oil spray and spray butter products are also healthful changes that will aid in your weight loss. Foods high in sugar are often also high in calories and low in nutrients. Eating habits after surgery will have to be a permanent and long-term change. Eating habits are so ingrained that it can be difficult to change. It is important to practice new eating habits prior to surgery to mentally prepare yourself for the challenge ahead. Also remember that overall health, age, and genetics make each persons weight loss progress different. Do not compare your progress (pre or post-operatively), the amount you eat, or exercise to other patients. In addition, it is the responsibility of the patient to schedule and follow up on labs and tests completed during the pre-operative period. Results will be reviewed at each visit.       Cardiac clearance, sleep clearance, PCP letter, support group

## 2019-07-06 PROBLEM — Z01.818 PRE-OPERATIVE CLEARANCE: Status: RESOLVED | Noted: 2019-06-06 | Resolved: 2019-07-06

## 2019-07-09 ENCOUNTER — TELEPHONE (OUTPATIENT)
Dept: ORTHOPEDIC SURGERY | Age: 54
End: 2019-07-09

## 2019-07-12 RX ORDER — ALBUTEROL SULFATE 90 UG/1
AEROSOL, METERED RESPIRATORY (INHALATION)
Qty: 8.5 INHALER | Refills: 5 | Status: SHIPPED | OUTPATIENT
Start: 2019-07-12 | End: 2020-05-12 | Stop reason: SDUPTHER

## 2019-07-15 ENCOUNTER — OFFICE VISIT (OUTPATIENT)
Dept: PULMONOLOGY | Age: 54
End: 2019-07-15
Payer: COMMERCIAL

## 2019-07-15 VITALS
WEIGHT: 198 LBS | SYSTOLIC BLOOD PRESSURE: 130 MMHG | OXYGEN SATURATION: 93 % | DIASTOLIC BLOOD PRESSURE: 87 MMHG | RESPIRATION RATE: 16 BRPM | HEART RATE: 73 BPM | BODY MASS INDEX: 39.32 KG/M2

## 2019-07-15 DIAGNOSIS — Z01.811 ENCOUNTER FOR PREOPERATIVE PULMONARY EXAMINATION: ICD-10-CM

## 2019-07-15 DIAGNOSIS — G47.10 HYPERSOMNIA: Primary | ICD-10-CM

## 2019-07-15 PROCEDURE — 99243 OFF/OP CNSLTJ NEW/EST LOW 30: CPT | Performed by: INTERNAL MEDICINE

## 2019-07-15 PROCEDURE — G8427 DOCREV CUR MEDS BY ELIG CLIN: HCPCS | Performed by: INTERNAL MEDICINE

## 2019-07-15 PROCEDURE — G8417 CALC BMI ABV UP PARAM F/U: HCPCS | Performed by: INTERNAL MEDICINE

## 2019-07-15 ASSESSMENT — ENCOUNTER SYMPTOMS
ABDOMINAL DISTENTION: 0
ABDOMINAL PAIN: 0
ANAL BLEEDING: 0
BACK PAIN: 0
CHOKING: 0
SORE THROAT: 0
RHINORRHEA: 0
APNEA: 0
CHEST TIGHTNESS: 0
WHEEZING: 0
SINUS PRESSURE: 0
DIARRHEA: 0
VOICE CHANGE: 0
BLOOD IN STOOL: 0
STRIDOR: 0
COUGH: 0
CONSTIPATION: 0
SHORTNESS OF BREATH: 1

## 2019-07-15 NOTE — PROGRESS NOTES
Joshua Bennett    YOB: 1965     Date of Service:  7/15/2019     Chief Complaint   Patient presents with    Pre-op Exam     referred by Dr. Brittany Richmond for weight loss surgery      Referred for consultation by Dr. Debby Aguirre for preop pulmonary evaluation for gastric sleeve surgery    HPI patient is here for a preop evaluation for gastric sleeve surgery. Some dyspnea with exertion particularly after a couple of blocks. Denies any significant cough or phlegm. No chest pain. Has history of back pain-spinal fusion surgeries done in the past.  Has history of allergies and GERD. Smoker of up to 2 packs a day for over 25 years, currently cut down to 5/day. No history of asthma, COPD or coronary artery disease. History of frequent awakening at nighttime, daytime somnolence and does not feel well rested. Wonders if she may have sleep apnea.     Allergies   Allergen Reactions    Abilify [Aripiprazole]     Celebrex [Celecoxib] Swelling    Gabapentin Other (See Comments)     MAKES HER FEEL LIKE SHE IS OUT OF IT, CANNOT FOCUS     Imdur [Isosorbide Dinitrate]     Lamictal [Lamotrigine] Rash    Ultram [Tramadol] Nausea And Vomiting     No outpatient medications have been marked as taking for the 7/15/19 encounter (Office Visit) with Samara Muñoz MD.       Immunization History   Administered Date(s) Administered    Tdap (Boostrix, Adacel) 2013, 2018       Past Medical History:   Diagnosis Date    Anxiety     Asthma     B[address] disorder ([address]     CAD (coronary artery disease)     Chronic back pain     COPD (chronic obstructive pulmonary disease) ([address]     Depression     Diabetes mellitus ([address]     GERD (gastroesophageal reflux disease)     Hypertension     Mixed hyperlipidemia     Myocardial infarction ([address]     PTSD (post-traumatic stress disorder)      Past Surgical History:   Procedure Laterality Date    BACK SURGERY       SECTION     

## 2019-07-19 ENCOUNTER — OFFICE VISIT (OUTPATIENT)
Dept: BARIATRICS/WEIGHT MGMT | Age: 54
End: 2019-07-19
Payer: COMMERCIAL

## 2019-07-19 DIAGNOSIS — Z71.89 INDIVIDUAL COUNSELING ENCOUNTER: Primary | ICD-10-CM

## 2019-07-19 PROCEDURE — 96151 PR HEAL & BEHAV ASSESS,EA 15 MIN,RE-ASSESS: CPT | Performed by: SOCIAL WORKER

## 2019-07-22 DIAGNOSIS — M54.16 LUMBAR RADICULOPATHY: ICD-10-CM

## 2019-07-22 RX ORDER — PREGABALIN 150 MG/1
CAPSULE ORAL
Qty: 60 CAPSULE | Refills: 1 | Status: SHIPPED | OUTPATIENT
Start: 2019-07-22 | End: 2020-02-17 | Stop reason: SDUPTHER

## 2019-07-25 RX ORDER — ESTRADIOL/NORETHINDRONE ACETATE TRANSDERMAL SYSTEM .05; .25 MG/D; MG/D
PATCH, EXTENDED RELEASE TRANSDERMAL
Qty: 8 PATCH | Refills: 3 | Status: SHIPPED | OUTPATIENT
Start: 2019-07-25 | End: 2020-06-22 | Stop reason: SDUPTHER

## 2019-08-05 ENCOUNTER — OFFICE VISIT (OUTPATIENT)
Dept: BARIATRICS/WEIGHT MGMT | Age: 54
End: 2019-08-05
Payer: COMMERCIAL

## 2019-08-05 VITALS
BODY MASS INDEX: 38.99 KG/M2 | HEART RATE: 84 BPM | WEIGHT: 198.6 LBS | HEIGHT: 60 IN | OXYGEN SATURATION: 98 % | SYSTOLIC BLOOD PRESSURE: 128 MMHG | DIASTOLIC BLOOD PRESSURE: 82 MMHG

## 2019-08-05 DIAGNOSIS — E11.69 DIABETES MELLITUS TYPE 2 IN OBESE (HCC): ICD-10-CM

## 2019-08-05 DIAGNOSIS — K21.9 CHRONIC GERD: ICD-10-CM

## 2019-08-05 DIAGNOSIS — E66.9 DIABETES MELLITUS TYPE 2 IN OBESE (HCC): ICD-10-CM

## 2019-08-05 DIAGNOSIS — E66.01 SEVERE OBESITY (BMI 35.0-39.9) WITH COMORBIDITY (HCC): Primary | ICD-10-CM

## 2019-08-05 DIAGNOSIS — E55.9 VITAMIN D DEFICIENCY: ICD-10-CM

## 2019-08-05 DIAGNOSIS — E78.2 MIXED HYPERLIPIDEMIA: ICD-10-CM

## 2019-08-05 DIAGNOSIS — I10 ESSENTIAL HYPERTENSION: ICD-10-CM

## 2019-08-05 PROCEDURE — 99213 OFFICE O/P EST LOW 20 MIN: CPT | Performed by: NURSE PRACTITIONER

## 2019-08-05 PROCEDURE — 4004F PT TOBACCO SCREEN RCVD TLK: CPT | Performed by: NURSE PRACTITIONER

## 2019-08-05 PROCEDURE — G8427 DOCREV CUR MEDS BY ELIG CLIN: HCPCS | Performed by: NURSE PRACTITIONER

## 2019-08-05 PROCEDURE — 3017F COLORECTAL CA SCREEN DOC REV: CPT | Performed by: NURSE PRACTITIONER

## 2019-08-05 PROCEDURE — 2022F DILAT RTA XM EVC RTNOPTHY: CPT | Performed by: NURSE PRACTITIONER

## 2019-08-05 PROCEDURE — 3044F HG A1C LEVEL LT 7.0%: CPT | Performed by: NURSE PRACTITIONER

## 2019-08-05 PROCEDURE — G8417 CALC BMI ABV UP PARAM F/U: HCPCS | Performed by: NURSE PRACTITIONER

## 2019-08-05 ASSESSMENT — ENCOUNTER SYMPTOMS
EYES NEGATIVE: 1
GASTROINTESTINAL NEGATIVE: 1
RESPIRATORY NEGATIVE: 1

## 2019-08-05 NOTE — PATIENT INSTRUCTIONS
changes necessary to be successful after surgery. There are many decaffeinated coffee and tea products available in grocery stores. You should be reducing added fat and sugar in your diet.  Frying foods adds too much fat and calories. Baking, broiling, or grilling meats add flavor without unhealthy fats. Using cooking oil spray and spray butter products are also healthful changes that will aid in your weight loss. Foods high in sugar are often also high in calories and low in nutrients. Eating habits after surgery will have to be a permanent and long-term change. Eating habits are so ingrained that it can be difficult to change. It is important to practice new eating habits prior to surgery to mentally prepare yourself for the challenge ahead. Also remember that overall health, age, and genetics make each persons weight loss progress different. Do not compare your progress (pre or post-operatively), the amount you eat, or exercise to other patients. In addition, it is the responsibility of the patient to schedule and follow up on labs and tests completed during the pre-operative period. Results will be reviewed at each visit.

## 2019-08-05 NOTE — PROGRESS NOTES
Aroldo Dominguez lost 1 lbs over past month. Pt working with behavioristJason. Breakfast: sleeping    Snack: does not eat OR might have atkins bar     Lunch: chicken salad OR cottage cheese     Snack: atkins bar     Dinner: steak and grilled asparagus     Snack: nothing    Fluids: water, eliminated coke, decaf coffee with cream and stevia, water flavorings     Is pt consuming smaller portions? Yes     Is pt consuming at least 64 oz of fluids per day? Yes     Is pt consuming carbonated, caffeinated, or sugary beverages? No     Has pt sampled Unjury and/or Nectar protein?  Not yet     Exercise: Walking to and from work daily - (20 minutes)     Plan/Recommendations:   Try protein powder - provided sample of unjury chicken soup today   Increase fluid intake     Handouts: none     Nichol Khan

## 2019-08-05 NOTE — PROGRESS NOTES
[address]  Physicians  [facility] [employer]    Subjective:      Patient ID: Suzette Galvan is a 48 y. o.female    HPI    Presurgery    Suzette Galvan is a very pleasant 48 y.o. female with Body mass index is 39.44 kg/m². Deborah Dixon She is continuing to cut back on her smoking, now having a couple cigarettes a week. I re-iterated the need to be free of all nicotine and tobacco products at least 2 months prior to scheduling her surgery. Past Medical History:   Diagnosis Date    Anxiety     Asthma     B[address] disorder ([address]     CAD (coronary artery disease)     Chronic back pain     COPD (chronic obstructive pulmonary disease) (HCC)     Depression     Diabetes mellitus (HCC)     GERD (gastroesophageal reflux disease)     Hypertension     Mixed hyperlipidemia     Myocardial infarction ([facility] [address]     PTSD (post-traumatic stress disorder)      Past Surgical History:   Procedure Laterality Date    BACK SURGERY       SECTION      CHOLECYSTECTOMY      HIP ARTHROSCOPY Left     SPINAL FUSION      end of 2018 hardware removed - spinal fusion         Family History   Problem Relation Age of Onset    Other Mother         MENTAL HEALTH     Alcohol Abuse Mother     Mental Illness Mother     Heart Disease Father     Hypertension Father     Elevated Lipids Father     Alcohol Abuse Father     Cancer Sister         BREAST     Kidney Disease Daughter      Social History     Tobacco Use    Smoking status: Current Every Day Smoker     Packs/day: 2.00     Years: 35.00     Pack years: 70.00     Types: Cigarettes    Smokeless tobacco: Never Used    Tobacco comment: down to 5 cigs per  /day as of 7/15/19   Substance Use Topics    Alcohol use: No     Comment: Sober for 2 years- hx of alcoholism     I counseled the patient on the importance of not smoking and risks of ETOH.       Allergies   Allergen Reactions    Abilify [Aripiprazole]     Celebrex [Celecoxib] Swelling    Gabapentin Other

## 2019-08-06 ENCOUNTER — OFFICE VISIT (OUTPATIENT)
Dept: ORTHOPEDIC SURGERY | Age: 54
End: 2019-08-06
Payer: COMMERCIAL

## 2019-08-06 VITALS
RESPIRATION RATE: 16 BRPM | SYSTOLIC BLOOD PRESSURE: 133 MMHG | HEART RATE: 72 BPM | HEIGHT: 59 IN | WEIGHT: 198 LBS | BODY MASS INDEX: 39.92 KG/M2 | DIASTOLIC BLOOD PRESSURE: 88 MMHG

## 2019-08-06 DIAGNOSIS — M79.672 LEFT FOOT PAIN: Primary | ICD-10-CM

## 2019-08-06 PROCEDURE — G8427 DOCREV CUR MEDS BY ELIG CLIN: HCPCS | Performed by: ORTHOPAEDIC SURGERY

## 2019-08-06 PROCEDURE — 99214 OFFICE O/P EST MOD 30 MIN: CPT | Performed by: ORTHOPAEDIC SURGERY

## 2019-08-06 PROCEDURE — G8417 CALC BMI ABV UP PARAM F/U: HCPCS | Performed by: ORTHOPAEDIC SURGERY

## 2019-08-06 PROCEDURE — 4004F PT TOBACCO SCREEN RCVD TLK: CPT | Performed by: ORTHOPAEDIC SURGERY

## 2019-08-06 PROCEDURE — 3017F COLORECTAL CA SCREEN DOC REV: CPT | Performed by: ORTHOPAEDIC SURGERY

## 2019-08-12 ENCOUNTER — OFFICE VISIT (OUTPATIENT)
Dept: ORTHOPEDIC SURGERY | Age: 54
End: 2019-08-12
Payer: COMMERCIAL

## 2019-08-12 VITALS
BODY MASS INDEX: 39.88 KG/M2 | HEIGHT: 59 IN | SYSTOLIC BLOOD PRESSURE: 104 MMHG | WEIGHT: 197.8 LBS | HEART RATE: 85 BPM | DIASTOLIC BLOOD PRESSURE: 75 MMHG

## 2019-08-12 DIAGNOSIS — M75.02 ADHESIVE CAPSULITIS OF LEFT SHOULDER: Primary | ICD-10-CM

## 2019-08-12 DIAGNOSIS — M77.8 LEFT SHOULDER TENDONITIS: ICD-10-CM

## 2019-08-12 PROCEDURE — 20610 DRAIN/INJ JOINT/BURSA W/O US: CPT | Performed by: ORTHOPAEDIC SURGERY

## 2019-08-12 RX ORDER — BETAMETHASONE SODIUM PHOSPHATE AND BETAMETHASONE ACETATE 3; 3 MG/ML; MG/ML
12 INJECTION, SUSPENSION INTRA-ARTICULAR; INTRALESIONAL; INTRAMUSCULAR; SOFT TISSUE ONCE
Status: COMPLETED | OUTPATIENT
Start: 2019-08-12 | End: 2019-08-12

## 2019-08-12 RX ADMIN — BETAMETHASONE SODIUM PHOSPHATE AND BETAMETHASONE ACETATE 12 MG: 3; 3 INJECTION, SUSPENSION INTRA-ARTICULAR; INTRALESIONAL; INTRAMUSCULAR; SOFT TISSUE at 09:37

## 2019-08-12 NOTE — LETTER
[facility]  [address]  [address]  Phone: [phone]  Fax: [phone]    Dimitri Luna MD        August 12, 2019       Patient: Kylie Eaton   MR Number: [id number]   YOB: 1965   Date of Visit: 8/12/2019       Dear Dr. Jethro Valdez: Thank you for the request for consultation for Kylie Eaton to me for the evaluation of left shoulder pain, left rotator cuff tendinitis. Below are the relevant portions of my assessment and plan of care. If you have questions, please do not hesitate to call me. I look forward to following Chloe Dolan along with you.     Sincerely,        Sydnie Allison MD     providers:  Arline Chan, [address] [place] [address] [address]  [place]

## 2019-08-12 NOTE — PROGRESS NOTES
Asia Blanco  [id number]  Encounter Date: 8/12/2019  YOB: 1965    Chief Complaint   Patient presents with    Follow-up     f/u left shoulder, injection helped until a month ago, now pain has returned       History:Ms. Asia Blanco is here in follow up regarding her left shoulder. She did not follow-up with formal physical therapy after her last visit in March. She reports within a day or 2 she had almost complete relief of her pain from the cortisone injection. She has been trying to do some exercises that she found on the Internet, but her pain started to return about 6 weeks ago. Is gotten more severe now even with simple tasks such as opening and closing the screen door she has significant pain up to an 8/10 in the lateral aspect of her left shoulder. She also has pain more in the posterior half. She denies any numbness or tingling down the arm. No loss of  strength of the hand. No new injury since her last visit. Exam:  /75   Pulse 85   Ht 4' 11\" (1.499 m)   Wt 197 lb 12.8 oz (89.7 kg)   LMP 03/09/2016 (Approximate)   BMI 39.95 kg/m²   General: Alert and oriented x3, No acute distress, Cooperative and conversant. Obesity Present  Mood and affect are appropriate. Left shoulder: Deltoid contour is maintained. She has a slightly rounded posture of both shoulders when seated. Pain with active range of motion left shoulder with forward flexion limited to 90 degrees and abduction limited to 80 degrees with soft end feel. Internal rotation to her left flank and external rotation with her elbow at her side is 45 degrees. Strength against resistance on forward flexion and abduction are 4-/5 with moderate pain. Elbow and wrist show good functional range of motion. Left hand  strength 5/5.   Sensation to light touch grossly present throughout the left upper extremity  Capillary refill < 2 seconds and palpable distal pulses  Skin: no erythema, lesions or

## 2019-08-27 ENCOUNTER — ANESTHESIA EVENT (OUTPATIENT)
Dept: ENDOSCOPY | Age: 54
End: 2019-08-27
Payer: COMMERCIAL

## 2019-08-27 ENCOUNTER — INITIAL CONSULT (OUTPATIENT)
Dept: BARIATRICS/WEIGHT MGMT | Age: 54
End: 2019-08-27
Payer: COMMERCIAL

## 2019-08-27 DIAGNOSIS — F43.10 PTSD (POST-TRAUMATIC STRESS DISORDER): ICD-10-CM

## 2019-08-27 DIAGNOSIS — E66.01 SEVERE OBESITY (BMI 35.0-35.9 WITH COMORBIDITY) (HCC): ICD-10-CM

## 2019-08-27 DIAGNOSIS — F31.32 BIPOLAR DISORDER WITH MODERATE DEPRESSION (HCC): Primary | ICD-10-CM

## 2019-08-27 DIAGNOSIS — F41.9 ANXIETY: ICD-10-CM

## 2019-08-27 PROCEDURE — 90791 PSYCH DIAGNOSTIC EVALUATION: CPT | Performed by: PSYCHOLOGIST

## 2019-09-13 ENCOUNTER — ANESTHESIA (OUTPATIENT)
Dept: ENDOSCOPY | Age: 54
End: 2019-09-13
Payer: COMMERCIAL

## 2019-09-13 ENCOUNTER — HOSPITAL ENCOUNTER (OUTPATIENT)
Age: 54
Setting detail: OUTPATIENT SURGERY
Discharge: HOME | End: 2019-09-13
Attending: SURGERY | Admitting: SURGERY
Payer: COMMERCIAL

## 2019-09-13 VITALS
HEART RATE: 72 BPM | TEMPERATURE: 98.7 F | SYSTOLIC BLOOD PRESSURE: 125 MMHG | OXYGEN SATURATION: 98 % | RESPIRATION RATE: 18 BRPM | DIASTOLIC BLOOD PRESSURE: 75 MMHG

## 2019-09-13 VITALS — OXYGEN SATURATION: 98 % | DIASTOLIC BLOOD PRESSURE: 85 MMHG | SYSTOLIC BLOOD PRESSURE: 132 MMHG

## 2019-09-13 PROBLEM — K44.9 HIATAL HERNIA: Status: ACTIVE | Noted: 2019-09-13

## 2019-09-13 PROCEDURE — 2500000003 HC RX 250 WO HCPCS: Performed by: NURSE ANESTHETIST, CERTIFIED REGISTERED

## 2019-09-13 PROCEDURE — 88305 TISSUE EXAM BY PATHOLOGIST: CPT

## 2019-09-13 PROCEDURE — 7100000011 HC PHASE II RECOVERY - ADDTL 15 MIN: Performed by: SURGERY

## 2019-09-13 PROCEDURE — 6360000002 HC RX W HCPCS: Performed by: NURSE ANESTHETIST, CERTIFIED REGISTERED

## 2019-09-13 PROCEDURE — 2580000003 HC RX 258: Performed by: ANESTHESIOLOGY

## 2019-09-13 PROCEDURE — 43239 EGD BIOPSY SINGLE/MULTIPLE: CPT | Performed by: SURGERY

## 2019-09-13 PROCEDURE — 7100000000 HC PACU RECOVERY - FIRST 15 MIN: Performed by: SURGERY

## 2019-09-13 PROCEDURE — 7100000001 HC PACU RECOVERY - ADDTL 15 MIN: Performed by: SURGERY

## 2019-09-13 PROCEDURE — 3609012400 HC EGD TRANSORAL BIOPSY SINGLE/MULTIPLE: Performed by: SURGERY

## 2019-09-13 PROCEDURE — 2709999900 HC NON-CHARGEABLE SUPPLY: Performed by: SURGERY

## 2019-09-13 PROCEDURE — 3700000000 HC ANESTHESIA ATTENDED CARE: Performed by: SURGERY

## 2019-09-13 PROCEDURE — 7100000010 HC PHASE II RECOVERY - FIRST 15 MIN: Performed by: SURGERY

## 2019-09-13 RX ORDER — LIDOCAINE HYDROCHLORIDE 10 MG/ML
1 INJECTION, SOLUTION EPIDURAL; INFILTRATION; INTRACAUDAL; PERINEURAL
Status: DISCONTINUED | OUTPATIENT
Start: 2019-09-13 | End: 2019-09-13 | Stop reason: HOSPADM

## 2019-09-13 RX ORDER — PROPOFOL 10 MG/ML
INJECTION, EMULSION INTRAVENOUS PRN
Status: DISCONTINUED | OUTPATIENT
Start: 2019-09-13 | End: 2019-09-13 | Stop reason: SDUPTHER

## 2019-09-13 RX ORDER — SODIUM CHLORIDE 9 MG/ML
INJECTION, SOLUTION INTRAVENOUS CONTINUOUS
Status: DISCONTINUED | OUTPATIENT
Start: 2019-09-13 | End: 2019-09-13 | Stop reason: HOSPADM

## 2019-09-13 RX ORDER — LIDOCAINE HYDROCHLORIDE 20 MG/ML
INJECTION, SOLUTION INFILTRATION; PERINEURAL PRN
Status: DISCONTINUED | OUTPATIENT
Start: 2019-09-13 | End: 2019-09-13 | Stop reason: SDUPTHER

## 2019-09-13 RX ADMIN — SODIUM CHLORIDE: 9 INJECTION, SOLUTION INTRAVENOUS at 11:05

## 2019-09-13 RX ADMIN — PROPOFOL 40 MG: 10 INJECTION, EMULSION INTRAVENOUS at 11:45

## 2019-09-13 RX ADMIN — LIDOCAINE HYDROCHLORIDE 100 MG: 20 INJECTION, SOLUTION INFILTRATION; PERINEURAL at 11:43

## 2019-09-13 RX ADMIN — PROPOFOL 120 MG: 10 INJECTION, EMULSION INTRAVENOUS at 11:43

## 2019-09-13 RX ADMIN — PROPOFOL 40 MG: 10 INJECTION, EMULSION INTRAVENOUS at 11:48

## 2019-09-13 ASSESSMENT — LIFESTYLE VARIABLES: SMOKING_STATUS: 1

## 2019-09-13 ASSESSMENT — COPD QUESTIONNAIRES: CAT_SEVERITY: MODERATE

## 2019-09-13 NOTE — H&P
Laterality Date    BACK SURGERY       SECTION      CHOLECYSTECTOMY      HIP ARTHROSCOPY Left     SPINAL FUSION      end of 2018 hardware removed - spinal fusion      Medications Prior to Admission:   Medications Prior to Admission: COMBIPATCH 0.05-0.25 MG/DAY, APPLY 1 PATCH ONTO SKIN TWICE WEEKLY  pregabalin (LYRICA) 150 MG capsule, 1 po bid  albuterol sulfate  (90 Base) MCG/ACT inhaler, INHALE 2 PUFFS BY MOUTH EVERY 6 HOURS AS NEEDED FOR WHEEZE  diclofenac sodium 1 % GEL, APPLY 2 G TOPICALLY 2 TIMES DAILY  vitamin D (ERGOCALCIFEROL) 22671 units CAPS capsule, Take 1 capsule by mouth once a week for 12 doses  montelukast (SINGULAIR) 10 MG tablet, TAKE 1 TABLET BY MOUTH EVERY DAY AT NIGHT  pantoprazole (PROTONIX) 40 MG tablet, Take 1 tablet by mouth every morning (before breakfast)  Emollient (LIBRA) cream, Apply topically as needed for Dry Skin Apply topically as needed.   albuterol-ipratropium (COMBIVENT RESPIMAT)  MCG/ACT AERS inhaler, Inhale 1 puff into the lungs every 6 hours as needed for Wheezing  ibuprofen (ADVIL;MOTRIN) 800 MG tablet, Take 1 tablet by mouth every 8 hours as needed for Pain  DULoxetine (CYMBALTA) 30 MG extended release capsule, 1 po daily for 1 day then 2 po daily  glycopyrrolate-formoterol (BEVESPI) 9-4.8 MCG/ACT AERO, Inhale 2 puffs into the lungs 2 times daily  diphenhydrAMINE (DIPHENHIST) 25 MG tablet, Take 25 mg by mouth every 6 hours as needed for Itching  Multiple Vitamin (MULTI-VITAMIN PO), Take by mouth  loratadine (CLARITIN) 10 MG tablet, Take 10 mg by mouth daily  blood glucose test strips (FREESTYLE LITE) strip, TESTING 3 TIMES A DAY FOR HYPERGYLCEMIA  DULoxetine (CYMBALTA) 60 MG extended release capsule, Take 1 capsule by mouth daily  EPINEPHrine (EPIPEN) 0.3 MG/0.3ML SOAJ injection, Use as directed for allergic reaction  promethazine (PHENERGAN) 25 MG tablet, Take 1 tablet by mouth every 6 hours as needed for Nausea  diazepam (VALIUM) 5 MG

## 2019-09-13 NOTE — OP NOTE
[facility]) Physicians   [facility]  [address] [address] . Phone: [phone]  Fax: [phone]         Esophagogastroduodenoscopy     Indications: Pre-op gastric Surgery, history of / rule out Gastroesophageal reflux disease. Pre-operative Diagnosis: Obesity/pre-op bariatric surgery . Post-operative Diagnosis: same. Surgeon: Benjy Haines MD    Anesthesia: General endotracheal anesthesia    ASA Class: 3    Procedure Details   The patient was seen in the Holding Room. The risks, benefits, complications, treatment options, and expected outcomes were discussed with the patient. Pre-op Endoscopy recommended to rule any intragastric pathology that would interfere with proposed procedure /  And or due to current conditions. The possibilities of reaction to medication, pulmonary aspiration, perforation of viscus, bleeding, recurrent infection, the need for additional procedures, failure to diagnose a condition, and creating a complication requiring transfusion or operation were discussed with the patient. The patient concurred with the proposed plan, giving informed consent. The site of surgery properly noted/marked. The patient was taken to Endoscopy Suite, identified as Martin Ceja and the procedure verified as  Esophagogastroduodenoscopy  A Time Out was held and the above information confirmed. Upper Endoscopy: An endoscope was inserted through the oropharynx into the upper esophagus. The endoscope was passed into the stomach to the level of the pylorus and passed to the duodenum where the ampulla was visualized and duodenum was abnormal, mild duodenitis . Then the scope was retracted back to the stomach and it was abnormal , antral gastritis biopsy of the antrum obtained, then retroflexed and the gastroesophageal junction was inspected . There was a hiatal hernia, no clear evidence of Dorman's.       Findings:  Esophageal findings include:             Upper: normal Esophageal Mucosa             Mid: normal Esophageal Mucosa             Distal: normal Esophageal Mucosa    Gastric findings include: abnormal Gastric Mucosa    Hiatal Hernia    Duodenal Findings: abnormal Duodenal Mucosa      Estimated Blood Loss:  Minimal      Biopsy:  Antrum, duodenum      Complications:  None; patient tolerated the procedure well. Disposition: PACU - hemodynamically stable. Condition: stable    Attending Attestation: I was present and scrubbed for the entire procedure.

## 2019-09-13 NOTE — ANESTHESIA PRE PROCEDURE
quit: Not Answered  Counseling given: Not Answered  Comment: couple cig a week      Vital Signs (Current): There were no vitals filed for this visit. BP Readings from Last 3 Encounters:   08/12/19 104/75   08/06/19 133/88   08/05/19 128/82       NPO Status:                                                                                 BMI:   Wt Readings from Last 3 Encounters:   08/12/19 197 lb 12.8 oz (89.7 kg)   08/06/19 198 lb (89.8 kg)   08/05/19 198 lb 9.6 oz (90.1 kg)     There is no height or weight on file to calculate BMI.    CBC:   Lab Results   Component Value Date    WBC 8.2 06/26/2019    RBC 4.57 06/26/2019    HGB 14.4 06/26/2019    HCT 41.9 06/26/2019    MCV 91.7 06/26/2019    RDW 14.9 06/26/2019     06/26/2019       CMP:   Lab Results   Component Value Date     06/26/2019    K 4.2 06/26/2019     06/26/2019    CO2 21 06/26/2019    BUN 14 06/26/2019    CREATININE 0.6 06/26/2019    GFRAA >60 06/26/2019    AGRATIO 1.3 06/26/2019    LABGLOM >60 06/26/2019    GLUCOSE 93 06/26/2019    PROT 7.7 06/26/2019    CALCIUM 9.5 06/26/2019    BILITOT 0.3 06/26/2019    ALKPHOS 119 06/26/2019    AST 16 06/26/2019    ALT 13 06/26/2019       POC Tests: No results for input(s): POCGLU, POCNA, POCK, POCCL, POCBUN, POCHEMO, POCHCT in the last 72 hours.     Coags:   Lab Results   Component Value Date    PROTIME 10.7 08/23/2018    INR 0.94 08/23/2018    APTT 37.0 08/23/2018       HCG (If Applicable): No results found for: PREGTESTUR, PREGSERUM, HCG, HCGQUANT     ABGs: No results found for: PHART, PO2ART, LHS8ODW, RMR6KVM, BEART, B6SCQTXD     Type & Screen (If Applicable):  No results found for: LABABO, [address]    Anesthesia Evaluation  Patient summary reviewed and Nursing notes reviewed  Airway: Mallampati: II  TM distance: >3 FB   Neck ROM: full  Mouth opening: > = 3 FB Dental:          Pulmonary:   (+) COPD: moderate,  current smoker          Patient smoked on day of

## 2019-09-16 ENCOUNTER — OFFICE VISIT (OUTPATIENT)
Dept: BARIATRICS/WEIGHT MGMT | Age: 54
End: 2019-09-16
Payer: COMMERCIAL

## 2019-09-16 VITALS
HEIGHT: 60 IN | OXYGEN SATURATION: 98 % | DIASTOLIC BLOOD PRESSURE: 80 MMHG | HEART RATE: 76 BPM | SYSTOLIC BLOOD PRESSURE: 138 MMHG | RESPIRATION RATE: 16 BRPM | WEIGHT: 198.6 LBS | BODY MASS INDEX: 38.99 KG/M2

## 2019-09-16 DIAGNOSIS — E66.9 DIABETES MELLITUS TYPE 2 IN OBESE (HCC): ICD-10-CM

## 2019-09-16 DIAGNOSIS — E11.69 DIABETES MELLITUS TYPE 2 IN OBESE (HCC): ICD-10-CM

## 2019-09-16 DIAGNOSIS — E78.2 MIXED HYPERLIPIDEMIA: ICD-10-CM

## 2019-09-16 DIAGNOSIS — R06.83 SNORING: ICD-10-CM

## 2019-09-16 DIAGNOSIS — I10 ESSENTIAL HYPERTENSION: ICD-10-CM

## 2019-09-16 DIAGNOSIS — E55.9 VITAMIN D DEFICIENCY: ICD-10-CM

## 2019-09-16 DIAGNOSIS — K21.9 CHRONIC GERD: ICD-10-CM

## 2019-09-16 DIAGNOSIS — E66.01 SEVERE OBESITY (BMI 35.0-39.9) WITH COMORBIDITY (HCC): Primary | ICD-10-CM

## 2019-09-16 PROCEDURE — G8417 CALC BMI ABV UP PARAM F/U: HCPCS | Performed by: NURSE PRACTITIONER

## 2019-09-16 PROCEDURE — 2022F DILAT RTA XM EVC RTNOPTHY: CPT | Performed by: NURSE PRACTITIONER

## 2019-09-16 PROCEDURE — 3017F COLORECTAL CA SCREEN DOC REV: CPT | Performed by: NURSE PRACTITIONER

## 2019-09-16 PROCEDURE — 3044F HG A1C LEVEL LT 7.0%: CPT | Performed by: NURSE PRACTITIONER

## 2019-09-16 PROCEDURE — G8427 DOCREV CUR MEDS BY ELIG CLIN: HCPCS | Performed by: NURSE PRACTITIONER

## 2019-09-16 PROCEDURE — 1036F TOBACCO NON-USER: CPT | Performed by: NURSE PRACTITIONER

## 2019-09-16 PROCEDURE — 99213 OFFICE O/P EST LOW 20 MIN: CPT | Performed by: NURSE PRACTITIONER

## 2019-09-16 RX ORDER — RANITIDINE 150 MG/1
75 TABLET ORAL NIGHTLY
Qty: 15 TABLET | Refills: 0 | Status: SHIPPED | OUTPATIENT
Start: 2019-09-16 | End: 2020-05-18 | Stop reason: ALTCHOICE

## 2019-09-16 ASSESSMENT — ENCOUNTER SYMPTOMS
GASTROINTESTINAL NEGATIVE: 1
RESPIRATORY NEGATIVE: 1
EYES NEGATIVE: 1

## 2019-09-16 NOTE — PATIENT INSTRUCTIONS
**You may receive a survey regarding the care you received during your visit. Your input is valuable to us. We encourage you to complete and return your survey. We hope you will choose us in the future for your healthcare needs. Patient received dietary handouts and education. Plan/Recommendations: Get protein at all meals and snacks; Stay prepared with healthy food at all times    Goals in preparing for bariatric surgery    You should be giving up all beverages that have carbonation, sugar, and caffeine. (Refer to the approved liquids list provided at initial visit)   You should be drinking 64 ounces of calorie free fluids per day. Suggestions include:  o Water (you may add fresh lemon or lime)  o Crystal Light  o John Liquid Water Enhancer  o Propel Zero  o Powerade Zero  o Isopure  o [id number]  o SOBE Lifewater Zero  o Vitamin Water Zero  o Sugar Free Wilfrido-Aid      You should be eating 4-6 times per day.  Three small meals plus 1-2 snacks per day is your goal. This balances your calories and nutrients evenly throughout the day and helps to boost your metabolism. Snacking is a good thing if you are choosing healthful options. Refer to the snack list provided at your initial visit. Aim for a protein at every snack, plus a fruit, vegetable or starch. You should be eating protein at every meal and snack.  Protein is typically found in animal sources, i.e. chicken, beef, pork, fish and seafood, eggs. It is also found in low-fat dairy sources such as skim or 1% milk, low-fat yogurt, low-fat cheese, and low-fat cottage cheese. Plant based sources of protein include peanut butter, beans, and soy. You should be utilizing the 9-inch plate method.  Eating on a smaller plate will help you control portion size. But what you put on your plate counts also. Make ¼ of your plate protein, ¼ starch and ½ the plate non-starchy vegetables. You should be eliminating caffeine.  Caffeine is dehydrating.  After

## 2019-09-18 ENCOUNTER — OFFICE VISIT (OUTPATIENT)
Dept: BARIATRICS/WEIGHT MGMT | Age: 54
End: 2019-09-18
Payer: COMMERCIAL

## 2019-09-18 DIAGNOSIS — E66.9 OBESITY (BMI 30-39.9): ICD-10-CM

## 2019-09-18 DIAGNOSIS — Z71.89 INDIVIDUAL COUNSELING ENCOUNTER: Primary | ICD-10-CM

## 2019-09-18 PROCEDURE — 96151 PR HEAL & BEHAV ASSESS,EA 15 MIN,RE-ASSESS: CPT | Performed by: SOCIAL WORKER

## 2019-09-18 NOTE — PROGRESS NOTES
Bebe Escobedo is a 48 y.o. female who presents today for individual counseling session. Patient appeared open, honest and with appropriate insight / cognition throughout assessment. Goals Review:  Patient reports she is doing \"fine\"     -cut out caffeine; decaf coffee and gatorade zero   -eating protein bars in the morning for breakfast. sometimes forgets, but she is getting better   -walks to and from work daily   -states that her shoulder and foot pain are preventing her from additional activity/exercise   -states she is getting better at spacing her meals apart, struggles because she does not eat dinner until 11 pm when she gets off work    [employer]    Current/Updated Goals:  Goals      Make [employer]al[employer] Lifestyle choices      -cut down on caffeine  -start eating small breakfasts as often as possible and work up to breakfast daily  -take dog on walk every other day or daily  -start tracking food via food journal, tracking evelyn, etc             Follow Up Plan:  -Work on saying \"no\" when she is feeling overworked, has been working a lot of overtime lately   -try to walk the dog more often   -Focus on getting more \"intentional\" activity in throughout the day: stretching when sitting, arm exercises, etc   -find a convenient way to track food using a different evelyn or notebook, etc (does not like myfitnesspal)      15 minutes was spent in direct counseling with patient.      [address] [place] [address]

## 2019-09-27 ENCOUNTER — OFFICE VISIT (OUTPATIENT)
Dept: FAMILY MEDICINE CLINIC | Age: 54
End: 2019-09-27
Payer: COMMERCIAL

## 2019-09-27 VITALS
HEART RATE: 76 BPM | RESPIRATION RATE: 16 BRPM | BODY MASS INDEX: 39.51 KG/M2 | DIASTOLIC BLOOD PRESSURE: 68 MMHG | WEIGHT: 196 LBS | SYSTOLIC BLOOD PRESSURE: 110 MMHG | HEIGHT: 59 IN

## 2019-09-27 DIAGNOSIS — E66.01 CLASS 2 SEVERE OBESITY DUE TO EXCESS CALORIES WITH SERIOUS COMORBIDITY AND BODY MASS INDEX (BMI) OF 39.0 TO 39.9 IN ADULT (HCC): ICD-10-CM

## 2019-09-27 DIAGNOSIS — F17.210 CIGARETTE NICOTINE DEPENDENCE WITHOUT COMPLICATION: ICD-10-CM

## 2019-09-27 DIAGNOSIS — G89.29 CHRONIC LEFT SHOULDER PAIN: ICD-10-CM

## 2019-09-27 DIAGNOSIS — M25.512 CHRONIC LEFT SHOULDER PAIN: ICD-10-CM

## 2019-09-27 DIAGNOSIS — E78.00 HYPERCHOLESTEREMIA: ICD-10-CM

## 2019-09-27 DIAGNOSIS — K21.9 GASTROESOPHAGEAL REFLUX DISEASE WITHOUT ESOPHAGITIS: Primary | ICD-10-CM

## 2019-09-27 PROCEDURE — G8417 CALC BMI ABV UP PARAM F/U: HCPCS | Performed by: FAMILY MEDICINE

## 2019-09-27 PROCEDURE — G8427 DOCREV CUR MEDS BY ELIG CLIN: HCPCS | Performed by: FAMILY MEDICINE

## 2019-09-27 PROCEDURE — 3017F COLORECTAL CA SCREEN DOC REV: CPT | Performed by: FAMILY MEDICINE

## 2019-09-27 PROCEDURE — 1036F TOBACCO NON-USER: CPT | Performed by: FAMILY MEDICINE

## 2019-09-27 PROCEDURE — 99214 OFFICE O/P EST MOD 30 MIN: CPT | Performed by: FAMILY MEDICINE

## 2019-09-27 RX ORDER — CYCLOBENZAPRINE HCL 10 MG
10 TABLET ORAL 3 TIMES DAILY PRN
Qty: 30 TABLET | Refills: 2 | Status: SHIPPED | OUTPATIENT
Start: 2019-09-27 | End: 2021-05-21

## 2019-09-27 RX ORDER — DULOXETIN HYDROCHLORIDE 30 MG/1
CAPSULE, DELAYED RELEASE ORAL
Qty: 30 CAPSULE | Refills: 5 | Status: SHIPPED | OUTPATIENT
Start: 2019-09-27 | End: 2021-01-15 | Stop reason: ALTCHOICE

## 2019-09-27 RX ORDER — FAMOTIDINE 20 MG/1
20 TABLET, FILM COATED ORAL NIGHTLY
Qty: 30 TABLET | Refills: 2 | Status: SHIPPED | OUTPATIENT
Start: 2019-09-27 | End: 2021-01-15 | Stop reason: ALTCHOICE

## 2019-09-27 RX ORDER — BUPROPION HYDROCHLORIDE 150 MG/1
TABLET, EXTENDED RELEASE ORAL
Qty: 60 TABLET | Refills: 5 | Status: SHIPPED | OUTPATIENT
Start: 2019-09-27 | End: 2021-01-15 | Stop reason: ALTCHOICE

## 2019-09-27 NOTE — PROGRESS NOTES
185#  1/17/19 weight was 189#  Review of Systems    Objective:   Physical Exam   Constitutional: She is oriented to person, place, and time. She appears well-developed and well-nourished. No distress. HENT:   Head: Normocephalic and atraumatic. Eyes: Conjunctivae are normal. No scleral icterus. Cardiovascular: Normal rate, regular rhythm, normal heart sounds and intact distal pulses. No murmur heard. Pulmonary/Chest: Effort normal and breath sounds normal. No respiratory distress. She has no wheezes. She has no rales. Abdominal: Soft. Bowel sounds are normal. She exhibits no distension. There is no tenderness. Musculoskeletal: She exhibits no edema. Neurological: She is alert and oriented to person, place, and time. Skin: Skin is warm and dry. Psychiatric: She has a normal mood and affect. Assessment:       Diagnosis Orders   1. Gastroesophageal reflux disease without esophagitis     2. Chronic left shoulder pain     3. Class 2 severe obesity due to excess calories with serious comorbidity and body mass index (BMI) of 39.0 to 39.9 in adult ([facility] [address]     4.  Hypercholesteremia             Plan:      Restart lipitor w/ big jump in LDL/ lower HDL on recent testing in June  Reviewed egd results - duodenitis/ gastritis but no esophagitis w/hiatal hernia  On protonix am, pepcid/ zantac in pm  Albuterol prn  Sleep study pending  Feels depressed w/ weight - cont cymbalta 30/d for now as highest dose tolerated -hold on valium  Letter for weight loss surgery  Cont lyrica for chronic back pain  On combipatch  On motrin for now w/ pain but not able to take after surgery   Doesn't have time to do PT right now - f/u ortho  Diclofenac gel for now w/ gi precautions    Add wellbutrin for smoking/ mood  biomed cream diclofenac sent    Nicki May MD

## 2019-11-19 ENCOUNTER — TELEPHONE (OUTPATIENT)
Dept: FAMILY MEDICINE CLINIC | Age: 54
End: 2019-11-19

## 2020-01-15 RX ORDER — PANTOPRAZOLE SODIUM 40 MG/1
TABLET, DELAYED RELEASE ORAL
Qty: 30 TABLET | Refills: 1 | Status: SHIPPED | OUTPATIENT
Start: 2020-01-15 | End: 2020-03-25

## 2020-02-17 RX ORDER — IBUPROFEN 800 MG/1
800 TABLET ORAL EVERY 8 HOURS PRN
Qty: 90 TABLET | Refills: 2 | Status: SHIPPED | OUTPATIENT
Start: 2020-02-17 | End: 2020-06-22

## 2020-02-17 RX ORDER — PREGABALIN 150 MG/1
CAPSULE ORAL
Qty: 60 CAPSULE | Refills: 1 | Status: SHIPPED | OUTPATIENT
Start: 2020-02-17 | End: 2020-06-19

## 2020-03-24 RX ORDER — IPRATROPIUM/ALBUTEROL SULFATE 20-100 MCG
MIST INHALER (GRAM) INHALATION
Qty: 1 INHALER | Refills: 5 | Status: SHIPPED | OUTPATIENT
Start: 2020-03-24 | End: 2020-07-31

## 2020-03-25 RX ORDER — PANTOPRAZOLE SODIUM 40 MG/1
TABLET, DELAYED RELEASE ORAL
Qty: 30 TABLET | Refills: 1 | Status: SHIPPED | OUTPATIENT
Start: 2020-03-25 | End: 2021-01-15 | Stop reason: SDUPTHER

## 2020-05-12 RX ORDER — ALBUTEROL SULFATE 90 UG/1
AEROSOL, METERED RESPIRATORY (INHALATION)
Qty: 8.5 INHALER | Refills: 1 | Status: SHIPPED | OUTPATIENT
Start: 2020-05-12 | End: 2021-01-15 | Stop reason: SDUPTHER

## 2020-05-18 ENCOUNTER — TELEMEDICINE (OUTPATIENT)
Dept: FAMILY MEDICINE CLINIC | Age: 55
End: 2020-05-18
Payer: COMMERCIAL

## 2020-05-18 PROCEDURE — 99213 OFFICE O/P EST LOW 20 MIN: CPT | Performed by: FAMILY MEDICINE

## 2020-05-18 PROCEDURE — G8427 DOCREV CUR MEDS BY ELIG CLIN: HCPCS | Performed by: FAMILY MEDICINE

## 2020-05-18 PROCEDURE — 3017F COLORECTAL CA SCREEN DOC REV: CPT | Performed by: FAMILY MEDICINE

## 2020-05-18 RX ORDER — DIAZEPAM 5 MG/1
5 TABLET ORAL EVERY 12 HOURS PRN
Qty: 20 TABLET | Refills: 0 | Status: SHIPPED | OUTPATIENT
Start: 2020-05-18 | End: 2020-08-01 | Stop reason: SDUPTHER

## 2020-06-22 RX ORDER — IBUPROFEN 800 MG/1
800 TABLET ORAL EVERY 8 HOURS PRN
Qty: 90 TABLET | Refills: 2 | Status: SHIPPED | OUTPATIENT
Start: 2020-06-22 | End: 2020-11-23

## 2020-06-22 RX ORDER — BLOOD-GLUCOSE METER
KIT MISCELLANEOUS
Qty: 50 STRIP | Refills: 5 | Status: SHIPPED | OUTPATIENT
Start: 2020-06-22 | End: 2021-01-08

## 2020-06-22 RX ORDER — ESTRADIOL/NORETHINDRONE ACETATE TRANSDERMAL SYSTEM .05; .25 MG/D; MG/D
PATCH, EXTENDED RELEASE TRANSDERMAL
Qty: 8 PATCH | Refills: 3 | Status: SHIPPED | OUTPATIENT
Start: 2020-06-22 | End: 2020-10-28

## 2020-07-30 ENCOUNTER — TELEPHONE (OUTPATIENT)
Dept: FAMILY MEDICINE CLINIC | Age: 55
End: 2020-07-30

## 2020-07-30 RX ORDER — MECLIZINE HYDROCHLORIDE 25 MG/1
25 TABLET ORAL EVERY 6 HOURS PRN
Qty: 30 TABLET | Refills: 0 | Status: SHIPPED | OUTPATIENT
Start: 2020-07-30 | End: 2021-07-22 | Stop reason: SDUPTHER

## 2020-07-30 RX ORDER — MECLIZINE HYDROCHLORIDE 25 MG/1
25 TABLET ORAL EVERY 6 HOURS PRN
COMMUNITY
Start: 2020-07-04 | End: 2020-07-30 | Stop reason: SDUPTHER

## 2020-07-30 NOTE — TELEPHONE ENCOUNTER
Patient has appointment tomorrow with Dr Agnieszka Cardoso. This is ER follow up. Has Vertigo and is out of Meclazine 25 mg. And needs this desperately.   Uses [facility] on [facility] in [facility]

## 2020-07-30 NOTE — TELEPHONE ENCOUNTER
I don't see that she's ever been prescribed meclizine. Check with Harjeet Burkett since he's seeing her tomorrow.

## 2020-07-31 ENCOUNTER — TELEMEDICINE (OUTPATIENT)
Dept: FAMILY MEDICINE CLINIC | Age: 55
End: 2020-07-31
Payer: COMMERCIAL

## 2020-07-31 PROBLEM — E11.9 DIABETES (HCC): Status: RESOLVED | Noted: 2018-08-23 | Resolved: 2020-07-31

## 2020-07-31 PROCEDURE — 99214 OFFICE O/P EST MOD 30 MIN: CPT | Performed by: FAMILY MEDICINE

## 2020-07-31 PROCEDURE — G8427 DOCREV CUR MEDS BY ELIG CLIN: HCPCS | Performed by: FAMILY MEDICINE

## 2020-07-31 PROCEDURE — 3017F COLORECTAL CA SCREEN DOC REV: CPT | Performed by: FAMILY MEDICINE

## 2020-07-31 NOTE — PROGRESS NOTES
5 % PT[facility] 1 patch by Transdermal route every 12 (twelve) hours. 2/7/20 Yes Luis Alberto Silva MD   albuterol 108 (90 Base) MCG/ACT AERS Use 2 puffs every 6 (six) hours as needed. Yes HISTORICAL MED   buPROPion ER (WELLBUTRIN SR) 150 MG TB12 Take 150 mg by mouth 2 (two) times daily. Yes HISTORICAL MED   pantoprazole DR (PROTONIX) 40 MG TBEC Take 40 mg by mouth daily. Yes HISTORICAL MED   diphenhydrAMINE (BENADRYL) 25 MG CAPS Take 25 mg by mouth every 4 to 6 hours as needed. Yes HISTORICAL MED   potassium chloride (KLOR-CON, K-TAB) 10 MEQ TBCR Take 10 mEq by mouth daily. Yes HISTORICAL MED   pregabalin (LYRICA) 100 MG CAPS Take 100 mg by mouth 2 (two) times daily. Yes HISTORICAL MED   diazepam (VALIUM) 5 MG TABS Take 5 mg by mouth 2 (two) times daily. Yes HISTORICAL MED     REVIEW OF SYSTEMS   Review of Systems   Constitutional: Negative for fever. HENT: Negative for sore throat. Eyes: Negative for pain. Respiratory: Positive for shortness of breath. Cardiovascular: Negative for chest pain. Gastrointestinal: Negative for abdominal pain. Genitourinary: Negative for flank pain. Musculoskeletal: Negative for falls. Skin: Negative for rash. Neurological: Positive for dizziness and loss of consciousness. Endo/Heme/Allergies: Does not bruise/bleed easily. Psychiatric/Behavioral: Positive for substance abuse. The patient is nervous/anxious. NOW  Was diagnosed with vertigo in the ER on July 4. Was placed on meclizine and referred to ENT. She was also given ondansetron for nausea. She has had a cold for a month. Extremely mild. She takes Benadryl if her nose is running. She drinks 2 bottles of water at 16.9 ounces each per day. Coffee 50 -60 ounces/per day half the time it is decaf. Dr. Margoth Trevino had given her Valium for her anxiety in the past and she is requesting that at the end of the appointment. Patient was on duloxetine in the past but not currently is out of refills.   She reported she was taking Wellbutrin in the emergency room but would be out by the refill history here. She repeats some of the stressors that occurred prior to her last visit 5/18/2020 when she was given Valium. Review of Systems    Objective:   Physical Exam  Vitals signs and nursing note reviewed. Constitutional:       General: She is not in acute distress. Appearance: She is obese. She is not ill-appearing, toxic-appearing or diaphoretic. HENT:      Head: Normocephalic and atraumatic. Mouth/Throat:      Mouth: Mucous membranes are moist.      Pharynx: No oropharyngeal exudate or posterior oropharyngeal erythema. Neck:      Musculoskeletal: Full passive range of motion without pain and neck supple. Trachea: Phonation normal.   Pulmonary:      Effort: No accessory muscle usage, respiratory distress or retractions. Neurological:      Mental Status: She is alert. Coordination: Romberg sign positive. Gait: Gait is intact. Gait normal.      Comments: Romberg testing with cervical range of motion: Right rotation is bad. Left rotation very mild. Flexion not bad. Extension is severe. Right sidebending worse by far than left sidebending. Psychiatric:         Attention and Perception: Attention normal.         Mood and Affect: Affect normal. Mood is anxious. Speech: Speech normal.         Behavior: Behavior normal. Behavior is cooperative.          Cognition and Memory: Cognition and memory normal.         Judgment: Judgment normal.        Patient-Reported Vitals 7/31/2020   Patient-Reported Weight 170LBS   Patient-Reported Height 4'11\"   Patient-Reported Systolic 393   Patient-Reported Diastolic 85      BP Readings from Last 3 Encounters:   09/27/19 110/68   09/16/19 138/80   09/13/19 132/85     Pulse Readings from Last 3 Encounters:   09/27/19 76   09/16/19 76   09/13/19 72     Wt Readings from Last 3 Encounters:   09/27/19 196 lb (88.9 kg)   09/16/19 198 lb 9.6 oz (90.1 kg) 08/12/19 197 lb 12.8 oz (89.7 kg)     ECG IMPRESSION - OTHERWISE NORMAL ECG -   ECG IMPRESSION SR-Sinus rhythm-normal P axis, V-rate 50-99     Laboratory  Urinalysis with Reflex to Culture   Collection Time: 07/04/20 6:02 PM   Result Value Ref Range   UA SPECIMEN SOURCE Urine, Clean Catch   URINE TYPE URINE   COLOR Yellow Yellow   APPEARANCE, URINE Clear Clear   PH, URINE 6.5 5.0 - 8.0   SPEC. GRAVITY, URINE 1.028 1.005 - 1.029   PROTEIN, URINE NEGATIVE NEGATIVE   GLUCOSE, URINE NEGATIVE NEGATIVE   KETONE, URINE NEGATIVE NEGATIVE   BILIRUBIN, URINE NEGATIVE NEGATIVE   BLOOD, URINE NEGATIVE NEGATIVE   UROBILINOGEN, URINE 1+ (2-3 mg/dL) (A) NORMAL mg/dL   NITRITE, URINE NEGATIVE NEGATIVE   LEUKOCYTE ESTERASE, URINE 3+ (250 Bridgette/uL) (A) NEGATIVE   RBC, URINE <6 <6 /HPF   WBC, URINE 6 to 10 (A) 0 to 5 /HPF   SQUAMOUS EPI CELLS 0 to 5 /HPF   MUCUS PRESENT   CBC w/ Diff   Collection Time: 07/04/20 6:29 PM   Result Value Ref Range   WBC 9.1 3.6 - 10.5 THOU/mcL   RBC 4.68 3.80 - 5.20 MIL/mcL   HEMOGLOBIN 14.0 12.0 - 15.2 g/dL   HEMATOCRIT 42.4 36 - 46 %   MCV 90.7 82 - 97 fL   MCH 29.9 27 - 33 pg   MCHC 32.9 32 - 36 g/dL   RDW 13.8 <15.3 %   PLATELET 757 534 - 776 THOU/mcL   MPV 8.1 7.4 - 11.5 fL   ABS.  NEUTROPHIL 4.80 1.80 - 7.70 THOU/mcL   ABS LYMPHS 3.20 1.00 - 4.00 THOU/mcL   ABS MONOS 0.60 0.20 - 0.90 THOU/mcL   ABS EOS 0.30 0.03 - 0.45 THOU/mcL   ABS BASOS 0.20 0.00 - 0.20 THOU/mcL   SEGS 52 %   LYMPHOCYTES 35 %   MONOCYTES 6 %   EOSINOPHIL 4 %   BASOPHILS 3 %   BAMP (Na,K,Cl,CO2,Glu,BUN,Creat,Ca)   Collection Time: 07/04/20 6:29 PM   Result Value Ref Range   BLD UREA NITROGEN 31 (H) 8 - 26 mg/dL   SODIUM 140 135 - 145 mEq/L   POTASSIUM 4.7 3.6 - 5.1 mEq/L   CHLORIDE 103 101 - 111 mEq/L   CO2 24 24 - 36 mmol/L   GLUCOSE, RANDOM 115 (H) 70 - 99 mg/dL   CREATININE 0.80 0.44 - 1.03 mg/dL   ANION GAP 13 6 - 18 mmol/L   CALCIUM 9.2 8.5 - 10.5 mg/dL   GFR NON-AFRICAN AMER 84 >59 mL/min/1.73 m2   GFR AFRICAN AMERICAN 96 >59 mL/min/1.73 m2   HS Troponin I   Collection Time: 07/04/20 6:29 PM   Result Value Ref Range   HS TROPONIN I 3 <15 ng/L     RADIOLOGY   CT HEAD WO CONTRAST   Final Result   No significant abnormality     XR CHEST AP PORTABLE   Final Result   No significant abnormality     Assessment:      1. Benign paroxysmal positional vertigo, unspecified laterality    2. Prediabetes    3. Adjustment disorder with mixed anxiety and depressed mood    4. Vitamin D deficiency          Plan:    [employer] video 36  Millie Stewart was seen today for other. Diagnoses and all orders for this visit:    Benign paroxysmal positional vertigo, unspecified laterality  With recent cold and allergies is likely the you have eustachian tube dysfunction as a cause of your vertigo. Treatment for this is simple. Gargle with mouthwash after meals and bedtime with head tilted back and to each side. Hold onto the sink/countertop while gargling due to possible worsening of the vertigo when tilting head. Go to am and bedtime when symptoms resolve for prevention. Vitamin C 500 mg twice daily till resolves then taper down to 250 mg daily. See also information and exercises below for vertigo. Prediabetes  With weight loss if you develop low blood sugar by meal skipping or eating meals that have zero carbs you can end up with hypoglycemia which will trigger panic attacks. Continue weight loss through frequent small balanced meals with a small amount of carbohydrates at each meal.  Weight loss has likely moved you out of the prediabetic zone. Keep up the good work. Adjustment disorder with mixed anxiety and depressed mood  -     diazePAM (VALIUM) 5 MG tablet; Take 1 tablet by mouth every 12 hours as needed for Anxiety or Sleep for up to 20 days. Valium can be very habit-forming. It does nothing to treat depression. It would appear that your prescriptions of Wellbutrin have run out.   Wellbutrin is not the best single drug for mixed anxiety and depression. It works well for low energy depression. It can sometimes make anxiety worse. Vitamin D deficiency  -     vitamin D (ERGOCALCIFEROL) 1.25 MG (23994 UT) CAPS capsule; Take 1 capsule by mouth once a week for 24 doses  Vitamin D helps protect against a severe COVID-19 reaction by strengthening your immune system. It is also used by psychiatrists for mood issues. Educated about COVID-19 virus infection  The risk is likely to increase for COVID-19 infection as the reopening occurs. This is because while some people are being careful with social distancing masks and social isolation other people are totally ignoring it. Preventing the Spread of Coronavirus Disease 2019 in Homes and Residential Communities   For the most recent information go to [email or web address]    Prevention steps for People with confirmed or suspected COVID-19 (including persons under investigation) who do not need to be hospitalized  and   People with confirmed COVID-19 who were hospitalized and determined to be medically stable to go home    Your healthcare provider and public health staff will evaluate whether you can be cared for at home. If it is determined that you do not need to be hospitalized and can be isolated at home, you will be monitored by staff from your local or state health department. You should follow the prevention steps below until a healthcare provider or local or state health department says you can return to your normal activities. Stay home except to get medical care  People who are mildly ill with COVID-19 are able to isolate at home during their illness. You should restrict activities outside your home, except for getting medical care. Do not go to work, school, or public areas. Avoid using public transportation, ride-sharing, or taxis.   Separate yourself from other people and animals in your home  People: As much as possible, you should stay in a specific room and away from other people in your home. Also, you should use a separate bathroom, if available. Animals: You should restrict contact with pets and other animals while you are sick with COVID-19, just like you would around other people. Although there have not been reports of pets or other animals becoming sick with COVID-19, it is still recommended that people sick with COVID-19 limit contact with animals until more information is known about the virus. When possible, have another member of your household care for your animals while you are sick. If you are sick with COVID-19, avoid contact with your pet, including petting, snuggling, being kissed or licked, and sharing food. If you must care for your pet or be around animals while you are sick, wash your hands before and after you interact with pets and wear a facemask. Call ahead before visiting your doctor  If you have a medical appointment, call the healthcare provider and tell them that you have or may have COVID-19. This will help the healthcare providers office take steps to keep other people from getting infected or exposed. Wear a facemask  You should wear a facemask when you are around other people (e.g., sharing a room or vehicle) or pets and before you enter a healthcare providers office. If you are not able to wear a facemask (for example, because it causes trouble breathing), then people who live with you should not stay in the same room with you, or they should wear a facemask if they enter your room. Cover your coughs and sneezes  Cover your mouth and nose with a tissue when you cough or sneeze. Throw used tissues in a lined trash can. Immediately wash your hands with soap and water for at least 20 seconds or, if soap and water are not available, clean your hands with an alcohol-based hand  that contains at least 60% alcohol.   Clean your hands often  Wash your hands often with soap and water for at least 20 seconds, especially after blowing your nose, coughing, or sneezing; going to the bathroom; and before eating or preparing food. If soap and water are not readily available, use an alcohol-based hand  with at least 60% alcohol, covering all surfaces of your hands and rubbing them together until they feel dry. Soap and water are the best option if hands are visibly dirty. Avoid touching your eyes, nose, and mouth with unwashed hands. Avoid sharing personal household items  You should not share dishes, drinking glasses, cups, eating utensils, towels, or bedding with other people or pets in your home. After using these items, they should be washed thoroughly with soap and water. Clean all high-touch surfaces everyday  High touch surfaces include counters, tabletops, doorknobs, bathroom fixtures, toilets, phones, keyboards, tablets, and bedside tables. Also, clean any surfaces that may have blood, stool, or body fluids on them. Use a household cleaning spray or wipe, according to the label instructions. Labels contain instructions for safe and effective use of the cleaning product including precautions you should take when applying the product, such as wearing gloves and making sure you have good ventilation during use of the product. Monitor your symptoms  Seek prompt medical attention if your illness is worsening (e.g., difficulty breathing). Before seeking care, call your healthcare provider and tell them that you have, or are being evaluated for, COVID-19. Put on a facemask before you enter the facility. These steps will help the healthcare providers office to keep other people in the office or waiting room from getting infected or exposed. Ask your healthcare provider to call the local or [facility] health department.  Persons who are placed under active monitoring or facilitated self-monitoring should follow instructions provided by their local health department or occupational health professionals, as appropriate. When working with your local health department check their available hours. If you have a medical emergency and need to call 911, notify the dispatch personnel that you have, or are being evaluated for COVID-19. If possible, put on a facemask before emergency medical services arrive. Discontinuing home isolation  Patients with confirmed COVID-19 should remain under home isolation precautions until the risk of secondary transmission to others is thought to be low. The decision to discontinue home isolation precautions should be made on a case-by-case basis, in consultation with healthcare providers and state and local health departments. Use Tylenol NOT Ibuprofen/Aleve/aspirin for pain or fevers. There is a theoretical decrease in the body's ability to fight the virus when you are infected if you are on NSAIDs. The FDA has said that this information is not yet proven. The newest evidence suggest that wearing a mask in public may be beneficial if you remember that the outside of it is contaminated and treat it accordingly. It also helps prevent spread if someone has an asymptomatic or presymptomatic case and does not know it yet. Even cloth masks can be beneficial.    Advance Care Planning  People with COVID-19 may have no symptoms, mild symptoms, such as fever, cough, and shortness of breath or they may have more severe illness, developing severe and fatal pneumonia. As a result, Advance Care Planning with attention to naming a health care decision maker (someone you trust to make healthcare decisions for you if you could not speak for yourself) and sharing other health care preferences is important BEFORE a possible health crisis. Please contact your Primary Care Provider to discuss Advance Care Planning. Vitamin D by mouth and vitamin C intravenously are being used as part of the treatment regimen for COVID-19 in some hospitalized patients. Take Vitamin D 50,000 IU once weekly.  Also start vitamin C 500 mg daily. Both of these should be continued for the duration of the moderate risk portion of the COVID-19 pandemic expected to be 2 years according to the [employer]. Continue wearing a mask when around people except those living in the same house who are not infected with or heavily exposed to COVID-19, handwashing/hand gel use, social distancing, and social isolation for nonessential activities. IF you develop ANY respiratory infection symptoms (not just allergy symptoms) suggestive of COVID-19 start the recommendations below: Instructions for Respiratory Infections (SAVE THIS SHEET)    For the first 7-14 days of symptoms follow instructions below, even before being seen in the office or even during treatment with antibiotics, until symptom free. 1. Water: Drink 1 ounce of water for every 2 pounds of body weight for adults, 76 Ounces of water/fluids per day. This will loosen mucus in the head and chest & improve the weak feeling of dehydration, allow the body to get germ fighting resources to the infection. Half of fluids can be juice or sugar free Crystal Light. Don't count drinks with caffeine, alcohol or carbonation. Infants can have Pedialyte liquid or freezer pops. Avoid salt and sports drinks if you have high Blood Pressure, swelling in the feet or ankles or have heart problems. 2. Humidity: Humidify the air to 35-50% ( or until the windows fog over slightly). Can use a humidifier, vaporizer, boil water on the stove or put a coffee can full of water on the heater vents. This will loosen mucus from infections and allergies. 3. Sleep: Get 8-10 hours a night and rest during the evening after work or school. If you have trouble sleeping, adults can take Melatonin 5mg up to 2 tabs at bedtime ( not for children or pregnant women). If Mono is suspected then sleep during 9PM to 9AM time span (if possible.)  4. Cough:  Take cough medicines with Guaifenesin ( to loosen chest or head congestion) and Dextromethorphan ( to decrease excess cough). Robitussin D.M. Syrup every 4-6 hrs OR Mucinex D. M. pills OR Delsym DM syrup twice a day. Use the pediatric formulations for children over 6 months making sure they are alcohol & sugar free for children, pregnant women, and diabetics. 5. Pain And Fevers: Take Acetaminophen ( Tylenol) for fevers, aches, and headaches. 2-500 mg every 8 hours for adults. Appropriate doses at bedtime for children may help them sleep better. If pregnant take 1 -500 mg (Tylenol) every 8 hours as needed. Ibuprofen/Aleve/aspirin for pain and fevers SHOULD NOT BE USED IN THE SETTING OF POSSIBLE COVID-19 viral infection NOR if pregnant, if you have acid reflux, high blood pressure, CHF, or kidney problems. 6.Gargle: (DAY ONE OF SYMPTOMS) Gargle in the back of the throat with the head tilted back and to the sides with a strong mouthwash  ( Listerine or Scope) after meals and at bedtime at least 4 -5 times a day. This helps kill bacteria and viruses in the back of the throat and will shorten the duration and decrease the severity of your symptoms: sore throat, cough, ear popping,/ear pain, and possibly dizziness. 7. Smoking: Avoid smoking or exposure to second hand smoke. 8. Zinc: (DAY ONE OF SYMPTOMS)  Zinc lozenges such as Cold Billy (available most stores), or Basic ([employer] brand) will help shorten the duration and lessen symptoms such as sore throat, cough, nasal congestion, runny nose, and post nasal drip. Use 1 lozenge every 2-4 hours ( after meals if stomach is sensitive). Children can use 10-15 mg or less 3-4 times a day or Zinc lollypops. In pregnancy limit to 50-60 mg a day for 7 days as prenatals have Zinc also. With diarrhea use zinc pills 50 mg 1/2 to 1 pill 2x/day. 9. Vitamins: Vitamin C 500 mg with breakfast and dinner (with suspected or diagnosed COVID-19 infection take vitamin C 1000 mg 2-3 times a day). Children and pregnant women should drink citrus juices. This speeds healing and strengthens immune system. 10. Chest Symptoms: Vicks Vapor rub to the chest at bedtime. 11. Decongestants: Avoid all decongestants and antihistamine cold preparations in children. Decongestants should always be avoided in people with high blood pressure, palpitations, heart disease and those on stimulant medications. Antihistamines may impede the body's ability to fight COVID-19.  12. Frequent hand washing and/or hand gel especially after coughing or sneezing into the hands or blowing the nose to help prevent spreading to others. Use kleenex and NOT handkerchiefs. Try all of the above starting with day 1 of symptoms. If Strep throat symptoms appear call to be seen in the office as soon as possible and don't gargle on that day. Newborns, infants, or anyone with earaches or influenza may need to be seen quickly. Adults with fevers over 103 degrees or shortness of breath should call the office immediately. Patient Education   Benign Paroxysmal Positional Vertigo (BPPV): Care  Eustachian Tube Problems: Care Instructions  Vertigo: Exercises  Epley Maneuver at Home for Vertigo: Exercises    Yosvany Marin is a 50 y.o. female being evaluated by a Virtual Visit (video visit) encounter to address concerns as mentioned above. A caregiver was present when appropriate. Due to this being a TeleHealth encounter (During ZFAV-26 public health emergency), evaluation of the following organ systems was limited: Vitals/Constitutional/EENT/Resp/CV/GI//MS/Neuro/Skin/Heme-Lymph-Imm. Pursuant to the emergency declaration under the [facility], [address] [place] [address] authority and the [employer] Act, this Virtual Visit was conducted with patient's (and/or legal guardian's) consent, to reduce the patient's risk of exposure to COVID-19 and provide necessary medical care. The patient (and/or legal guardian) has also been advised to contact this office for worsening conditions or problems, and seek emergency medical treatment and/or call 911 if deemed necessary. Patient identification was verified at the start of the visit: Yes    Total time spent for this encounter: 40 minutes of which more than half was spent in counseling. Services were provided through a video synchronous discussion virtually to substitute for in-person clinic visit. Patient and provider were located at their individual homes. --Jennifer Lewis DO on 10/3/2020 at 12:57 PM    An electronic signature was used to authenticate this note.     Jennifer Lewis DO

## 2020-08-01 RX ORDER — DIAZEPAM 5 MG/1
5 TABLET ORAL EVERY 12 HOURS PRN
Qty: 20 TABLET | Refills: 0 | Status: SHIPPED | OUTPATIENT
Start: 2020-08-01 | End: 2020-08-21

## 2020-08-01 RX ORDER — ERGOCALCIFEROL 1.25 MG/1
50000 CAPSULE ORAL WEEKLY
Qty: 12 CAPSULE | Refills: 1 | Status: SHIPPED | OUTPATIENT
Start: 2020-08-01 | End: 2021-01-15 | Stop reason: SDUPTHER

## 2020-10-03 NOTE — PATIENT INSTRUCTIONS
Giancarlo Lugo was seen today for other. Diagnoses and all orders for this visit:    Benign paroxysmal positional vertigo, unspecified laterality  With recent cold and allergies is likely the you have eustachian tube dysfunction as a cause of your vertigo. Treatment for this is simple. Gargle with mouthwash after meals and bedtime with head tilted back and to each side. Hold onto the sink/countertop while gargling due to possible worsening of the vertigo when tilting head. Go to am and bedtime when symptoms resolve for prevention. Vitamin C 500 mg twice daily till resolves then taper down to 250 mg daily. See also information and exercises below for vertigo. Prediabetes  With weight loss if you develop low blood sugar by meal skipping or eating meals that have zero carbs you can end up with hypoglycemia which will trigger panic attacks. Continue weight loss through frequent small balanced meals with a small amount of carbohydrates at each meal.  Weight loss has likely moved you out of the prediabetic zone. Keep up the good work. Adjustment disorder with mixed anxiety and depressed mood  -     diazePAM (VALIUM) 5 MG tablet; Take 1 tablet by mouth every 12 hours as needed for Anxiety or Sleep for up to 20 days. Valium can be very habit-forming. It does nothing to treat depression. It would appear that your prescriptions of Wellbutrin have run out. Wellbutrin is not the best single drug for mixed anxiety and depression. It works well for low energy depression. It can sometimes make anxiety worse. Vitamin D deficiency  -     vitamin D (ERGOCALCIFEROL) 1.25 MG (86895 UT) CAPS capsule; Take 1 capsule by mouth once a week for 24 doses  Vitamin D helps protect against a severe COVID-19 reaction by strengthening your immune system. It is also used by psychiatrists for mood issues. Educated about COVID-19 virus infection  The risk is likely to increase for COVID-19 infection as the reopening occurs. another member of your household care for your animals while you are sick. If you are sick with COVID-19, avoid contact with your pet, including petting, snuggling, being kissed or licked, and sharing food. If you must care for your pet or be around animals while you are sick, wash your hands before and after you interact with pets and wear a facemask. Call ahead before visiting your doctor  If you have a medical appointment, call the healthcare provider and tell them that you have or may have COVID-19. This will help the healthcare providers office take steps to keep other people from getting infected or exposed. Wear a facemask  You should wear a facemask when you are around other people (e.g., sharing a room or vehicle) or pets and before you enter a healthcare providers office. If you are not able to wear a facemask (for example, because it causes trouble breathing), then people who live with you should not stay in the same room with you, or they should wear a facemask if they enter your room. Cover your coughs and sneezes  Cover your mouth and nose with a tissue when you cough or sneeze. Throw used tissues in a lined trash can. Immediately wash your hands with soap and water for at least 20 seconds or, if soap and water are not available, clean your hands with an alcohol-based hand  that contains at least 60% alcohol. Clean your hands often  Wash your hands often with soap and water for at least 20 seconds, especially after blowing your nose, coughing, or sneezing; going to the bathroom; and before eating or preparing food. If soap and water are not readily available, use an alcohol-based hand  with at least 60% alcohol, covering all surfaces of your hands and rubbing them together until they feel dry. Soap and water are the best option if hands are visibly dirty. Avoid touching your eyes, nose, and mouth with unwashed hands.   Avoid sharing personal household items  You should not share dishes, drinking glasses, cups, eating utensils, towels, or bedding with other people or pets in your home. After using these items, they should be washed thoroughly with soap and water. Clean all high-touch surfaces everyday  High touch surfaces include counters, tabletops, doorknobs, bathroom fixtures, toilets, phones, keyboards, tablets, and bedside tables. Also, clean any surfaces that may have blood, stool, or body fluids on them. Use a household cleaning spray or wipe, according to the label instructions. Labels contain instructions for safe and effective use of the cleaning product including precautions you should take when applying the product, such as wearing gloves and making sure you have good ventilation during use of the product. Monitor your symptoms  Seek prompt medical attention if your illness is worsening (e.g., difficulty breathing). Before seeking care, call your healthcare provider and tell them that you have, or are being evaluated for, COVID-19. Put on a facemask before you enter the facility. These steps will help the healthcare providers office to keep other people in the office or waiting room from getting infected or exposed. Ask your healthcare provider to call the local or [facility] health department. Persons who are placed under active monitoring or facilitated self-monitoring should follow instructions provided by their local health department or occupational health professionals, as appropriate. When working with your local health department check their available hours. If you have a medical emergency and need to call 911, notify the dispatch personnel that you have, or are being evaluated for COVID-19. If possible, put on a facemask before emergency medical services arrive. Discontinuing home isolation  Patients with confirmed COVID-19 should remain under home isolation precautions until the risk of secondary transmission to others is thought to be low.  The decision to discontinue home isolation precautions should be made on a case-by-case basis, in consultation with healthcare providers and state and local health departments. Use Tylenol NOT Ibuprofen/Aleve/aspirin for pain or fevers. There is a theoretical decrease in the body's ability to fight the virus when you are infected if you are on NSAIDs. The FDA has said that this information is not yet proven. The newest evidence suggest that wearing a mask in public may be beneficial if you remember that the outside of it is contaminated and treat it accordingly. It also helps prevent spread if someone has an asymptomatic or presymptomatic case and does not know it yet. Even cloth masks can be beneficial.    Advance Care Planning  People with COVID-19 may have no symptoms, mild symptoms, such as fever, cough, and shortness of breath or they may have more severe illness, developing severe and fatal pneumonia. As a result, Advance Care Planning with attention to naming a health care decision maker (someone you trust to make healthcare decisions for you if you could not speak for yourself) and sharing other health care preferences is important BEFORE a possible health crisis. Please contact your Primary Care Provider to discuss Advance Care Planning. Vitamin D by mouth and vitamin C intravenously are being used as part of the treatment regimen for COVID-19 in some hospitalized patients. Take Vitamin D 50,000 IU once weekly. Also start vitamin C 500 mg daily. Both of these should be continued for the duration of the moderate risk portion of the COVID-19 pandemic expected to be 2 years according to the [employer]. Continue wearing a mask when around people except those living in the same house who are not infected with or heavily exposed to COVID-19, handwashing/hand gel use, social distancing, and social isolation for nonessential activities.     IF you develop ANY respiratory infection symptoms (not just allergy symptoms) suggestive of COVID-19 start the recommendations below: Instructions for Respiratory Infections (SAVE THIS SHEET)    For the first 7-14 days of symptoms follow instructions below, even before being seen in the office or even during treatment with antibiotics, until symptom free. 1. Water: Drink 1 ounce of water for every 2 pounds of body weight for adults, 76 Ounces of water/fluids per day. This will loosen mucus in the head and chest & improve the weak feeling of dehydration, allow the body to get germ fighting resources to the infection. Half of fluids can be juice or sugar free Crystal Light. Don't count drinks with caffeine, alcohol or carbonation. Infants can have Pedialyte liquid or freezer pops. Avoid salt and sports drinks if you have high Blood Pressure, swelling in the feet or ankles or have heart problems. 2. Humidity: Humidify the air to 35-50% ( or until the windows fog over slightly). Can use a humidifier, vaporizer, boil water on the stove or put a coffee can full of water on the heater vents. This will loosen mucus from infections and allergies. 3. Sleep: Get 8-10 hours a night and rest during the evening after work or school. If you have trouble sleeping, adults can take Melatonin 5mg up to 2 tabs at bedtime ( not for children or pregnant women). If Mono is suspected then sleep during 9PM to 9AM time span (if possible.)  4. Cough: Take cough medicines with Guaifenesin ( to loosen chest or head congestion) and Dextromethorphan ( to decrease excess cough). Robitussin D.M. Syrup every 4-6 hrs OR Mucinex D. M. pills OR Delsym DM syrup twice a day. Use the pediatric formulations for children over 6 months making sure they are alcohol & sugar free for children, pregnant women, and diabetics. 5. Pain And Fevers: Take Acetaminophen ( Tylenol) for fevers, aches, and headaches. 2-500 mg every 8 hours for adults.  Appropriate doses at bedtime for children may help them sleep better. If pregnant take 1 -500 mg (Tylenol) every 8 hours as needed. Ibuprofen/Aleve/aspirin for pain and fevers SHOULD NOT BE USED IN THE SETTING OF POSSIBLE COVID-19 viral infection NOR if pregnant, if you have acid reflux, high blood pressure, CHF, or kidney problems. 6.Gargle: (DAY ONE OF SYMPTOMS) Gargle in the back of the throat with the head tilted back and to the sides with a strong mouthwash  ( Listerine or Scope) after meals and at bedtime at least 4 -5 times a day. This helps kill bacteria and viruses in the back of the throat and will shorten the duration and decrease the severity of your symptoms: sore throat, cough, ear popping,/ear pain, and possibly dizziness. 7. Smoking: Avoid smoking or exposure to second hand smoke. 8. Zinc: (DAY ONE OF SYMPTOMS)  Zinc lozenges such as Cold Billy (available most stores), or Basic ([employer] brand) will help shorten the duration and lessen symptoms such as sore throat, cough, nasal congestion, runny nose, and post nasal drip. Use 1 lozenge every 2-4 hours ( after meals if stomach is sensitive). Children can use 10-15 mg or less 3-4 times a day or Zinc lollypops. In pregnancy limit to 50-60 mg a day for 7 days as prenatals have Zinc also. With diarrhea use zinc pills 50 mg 1/2 to 1 pill 2x/day. 9. Vitamins: Vitamin C 500 mg with breakfast and dinner (with suspected or diagnosed COVID-19 infection take vitamin C 1000 mg 2-3 times a day). Children and pregnant women should drink citrus juices. This speeds healing and strengthens immune system. 10. Chest Symptoms: Vicks Vapor rub to the chest at bedtime. 11. Decongestants: Avoid all decongestants and antihistamine cold preparations in children. Decongestants should always be avoided in people with high blood pressure, palpitations, heart disease and those on stimulant medications.    Antihistamines may impede the body's ability to fight COVID-19.  12. Frequent hand washing and/or hand gel with your ear down. ? Stay in this position for at least 30 seconds or until the vertigo goes away. ? Sit up. If this causes vertigo, wait for it to stop. ? Repeat the procedure on the other side. ? Repeat this 10 times. Do these exercises 2 times a day until the vertigo is gone. When should you call for help? [id number] anytime you think you may need emergency care. For example, call if:  · You have symptoms of a stroke. These may include:  ? Sudden numbness, tingling, weakness, or loss of movement in your face, arm, or leg, especially on only one side of your body. ? Sudden vision changes. ? Sudden trouble speaking. ? Sudden confusion or trouble understanding simple statements. ? Sudden problems with walking or balance. ? A sudden, severe headache that is different from past headaches. Call your doctor now or seek immediate medical care if:  · You have new or worse nausea and vomiting. · You have new symptoms such as hearing loss or roaring in your ears. Watch closely for changes in your health, and be sure to contact your doctor if:  · You are not getting better as expected. · Your vertigo gets worse. Where can you learn more? Go to [email or web address]://[email or web address] Financialpepiceweb.[email or web address] org and sign in to your WEbook account. Enter  in the [employer] box to learn more about \"Benign Paroxysmal Positional Vertigo (BPPV): Care Instructions. \"     If you do not have an account, please click on the \"Sign Up Now\" link. Current as of: July 29, 2019               Content Version: 12.5  © 4019-9168 Healthwise, Incorporated. Care instructions adapted under license by [facility] ([facility]). If you have questions about a medical condition or this instruction, always ask your healthcare professional. [place] [address] any warranty or liability for your use of this information.          Patient Education     Eustachian Tube Problems: Care Instructions  Your Care Instructions     The eustachian (say \"you-STAY-shee-un\") tubes run between the inside of the ears and the throat. They keep air pressure stable in the ears. If your eustachian tubes become blocked, the air pressure in your ears changes. The fluids from a cold can clog eustachian tubes, causing pain in the ears. A quick change in air pressure can cause eustachian tubes to close up. This might happen when an airplane changes altitude or when a  goes up or down underwater. Eustachian tube problems often clear up on their own or after antibiotic treatment. If your tubes continue to be blocked, you may need surgery. Follow-up care is a key part of your treatment and safety. Be sure to make and go to all appointments, and call your doctor if you are having problems. It's also a good idea to know your test results and keep a list of the medicines you take. How can you care for yourself at home? · To ease ear pain, apply a warm washcloth or a heating pad set on low. There may be some drainage from the ear when the heat melts earwax. Put a cloth between the heat source and your skin. Do not use a heating pad with children. · If your doctor prescribed antibiotics, take them as directed. Do not stop taking them just because you feel better. You need to take the full course of antibiotics. · Your doctor may recommend over-the-counter medicine. Be safe with medicines. Oral or nasal decongestants may relieve ear pain. Avoid decongestants that are combined with antihistamines, which tend to cause more blockage. But if allergies seem to be the problem, your doctor may recommend a combination. Be careful with cough and cold medicines. Don't give them to children younger than 6, because they don't work for children that age and can even be harmful. For children 6 and older, always follow all the instructions carefully. Make sure you know how much medicine to give and how long to use it. And use the dosing device if one is included.   When should you call for help? Call your doctor now or seek immediate medical care if:  · You develop sudden, complete hearing loss. · You have severe pain or feel dizzy. · You have new or increasing pus or blood draining from your ear. · You have redness, swelling, or pain around or behind the ear. Watch closely for changes in your health, and be sure to contact your doctor if:  · You do not get better after 2 weeks. · You have any new symptoms, such as itching or a feeling of fullness in the ear. Where can you learn more? Go to [email or web address] and sign in to your Rayn account. Enter Y822 in the [employer] box to learn more about \"Eustachian Tube Problems: Care Instructions. \"     If you do not have an account, please click on the \"Sign Up Now\" link. Current as of: July 29, 2019               Content Version: 12.5  © 4705-9163 [employer]. Care instructions adapted under license by [facility] [address] If you have questions about a medical condition or this instruction, always ask your healthcare professional. [place] [address] any warranty or liability for your use of this information. Patient Education        Vertigo: Exercises  Introduction  Here are some examples of exercises for you to try. The exercises may be suggested for a condition or for rehabilitation. Start each exercise slowly. Ease off the exercises if you start to have pain. You will be told when to start these exercises and which ones will work best for you. How to do the exercises  Exercise 1   1. Stand with a chair in front of you and a wall behind you. If you begin to fall, you may use them for support. 2. Stand with your feet together and your arms at your sides. 3. Move your head up and down 10 times. Exercise 2   1. Move your head side to side 10 times. Exercise 3   1. Move your head diagonally up and down 10 times. Exercise 4   1.  Move your head diagonally up and down 10 times on the other side. Follow-up care is a key part of your treatment and safety. Be sure to make and go to all appointments, and call your doctor if you are having problems. It's also a good idea to know your test results and keep a list of the medicines you take. Where can you learn more? Go to [email or web address]://[email or web address] and sign in to your MeFeedia account. Enter F349 in the [employer] box to learn more about \"Vertigo: Exercises. \"     If you do not have an account, please click on the \"Sign Up Now\" link. Current as of: July 29, 2019               Content Version: 12.5  © 3691-4825 Healthwise, [employer]. Care instructions adapted under license by [facility] ([facility]). If you have questions about a medical condition or this instruction, always ask your healthcare professional. [place] [address] any warranty or liability for your use of this information. Patient Education        Epley Maneuver at Home for Vertigo: Exercises  Introduction  Vertigo is a spinning or whirling sensation when you move your head. Your doctor may have moved you in different positions to help your vertigo get better faster. This is called the Epley maneuver. Your doctor also may have asked you to do these exercises at home. Do the exercises as often as your doctor recommends. If your vertigo is getting worse, your doctor may have you change the exercise or stop it. Step 1  Step 1   4. Sit on the edge of a bed or sofa. Step 2   2. Turn your head 45 degrees in the direction your doctor told you to. This should be toward the ear that causes the most vertigo for you. In this picture, the woman is turning toward her left ear. Step 3   2. Tilt yourself backward until you are lying on your back. Your head should still be at a 45-degree turn. Your head should be about midway between looking straight ahead and looking out to your side. Hold for 30 seconds.  If you have vertigo, stay in this position until it stops. Step 4   2. Turn your head 90 degrees toward the ear that has the least vertigo. In this picture, the woman is turning to the right because she has vertigo on her left side. The point of your chin should be raised and over your shoulder. Hold for 30 seconds. Step 5   1. Roll onto the side with the least vertigo. You should now be looking at the floor. Hold for 30 seconds. Follow-up care is a key part of your treatment and safety. Be sure to make and go to all appointments, and call your doctor if you are having problems. It's also a good idea to know your test results and keep a list of the medicines you take. Where can you learn more? Go to [email or web address]://[email or web address].compeAllied Digital Serviceseb.[email or web address] org and sign in to your SocialBrowse account. Enter Z838 in the [place] box to learn more about \"Epley Maneuver at Home for Vertigo: Exercises. \"     If you do not have an account, please click on the \"Sign Up Now\" link. Current as of: November 20, 2019               Content Version: 12.5  © 5072-0492 Healthwise, Incorporated. Care instructions adapted under license by [facility] ([facility]). If you have questions about a medical condition or this instruction, always ask your healthcare professional. [address] any warranty or liability for your use of this information.

## 2020-10-28 RX ORDER — ESTRADIOL/NORETHINDRONE ACETATE TRANSDERMAL SYSTEM .05; .25 MG/D; MG/D
PATCH, EXTENDED RELEASE TRANSDERMAL
Qty: 8 PATCH | Refills: 1 | Status: SHIPPED | OUTPATIENT
Start: 2020-10-28 | End: 2021-01-14

## 2020-11-23 RX ORDER — IBUPROFEN 800 MG/1
800 TABLET ORAL EVERY 8 HOURS PRN
Qty: 90 TABLET | Refills: 1 | Status: SHIPPED | OUTPATIENT
Start: 2020-11-23 | End: 2021-05-21

## 2020-12-22 RX ORDER — PREGABALIN 150 MG/1
CAPSULE ORAL
Qty: 60 CAPSULE | Refills: 2 | OUTPATIENT
Start: 2020-12-22 | End: 2021-02-02

## 2021-01-08 RX ORDER — BLOOD-GLUCOSE METER
KIT MISCELLANEOUS
Qty: 50 STRIP | Refills: 5 | Status: SHIPPED | OUTPATIENT
Start: 2021-01-08 | End: 2021-08-31

## 2021-01-12 ENCOUNTER — TELEPHONE (OUTPATIENT)
Dept: ADMINISTRATIVE | Age: 56
End: 2021-01-12

## 2021-01-12 NOTE — TELEPHONE ENCOUNTER
Submitted PA for FreeStyle Lite Test strips  [facility] Key: [id number]      Approved     . Please notify patient, thank you.

## 2021-01-14 RX ORDER — ESTRADIOL/NORETHINDRONE ACETATE TRANSDERMAL SYSTEM .05; .25 MG/D; MG/D
PATCH, EXTENDED RELEASE TRANSDERMAL
Qty: 8 PATCH | Refills: 1 | Status: SHIPPED | OUTPATIENT
Start: 2021-01-14 | End: 2021-04-13

## 2021-01-15 ENCOUNTER — TELEMEDICINE (OUTPATIENT)
Dept: FAMILY MEDICINE CLINIC | Age: 56
End: 2021-01-15
Payer: COMMERCIAL

## 2021-01-15 DIAGNOSIS — F41.9 ANXIETY: Primary | ICD-10-CM

## 2021-01-15 DIAGNOSIS — E78.00 HYPERCHOLESTEREMIA: ICD-10-CM

## 2021-01-15 DIAGNOSIS — M54.16 LUMBAR RADICULOPATHY: ICD-10-CM

## 2021-01-15 DIAGNOSIS — J30.9 ALLERGIC SINUSITIS: ICD-10-CM

## 2021-01-15 DIAGNOSIS — J41.0 SIMPLE CHRONIC BRONCHITIS (HCC): ICD-10-CM

## 2021-01-15 DIAGNOSIS — Z11.59 ENCOUNTER FOR HEPATITIS C SCREENING TEST FOR LOW RISK PATIENT: ICD-10-CM

## 2021-01-15 DIAGNOSIS — E55.9 VITAMIN D DEFICIENCY: ICD-10-CM

## 2021-01-15 DIAGNOSIS — K21.9 CHRONIC GERD: ICD-10-CM

## 2021-01-15 DIAGNOSIS — R73.03 PREDIABETES: ICD-10-CM

## 2021-01-15 PROCEDURE — 99214 OFFICE O/P EST MOD 30 MIN: CPT | Performed by: FAMILY MEDICINE

## 2021-01-15 RX ORDER — ALBUTEROL SULFATE 90 UG/1
AEROSOL, METERED RESPIRATORY (INHALATION)
Qty: 8.5 INHALER | Refills: 2 | Status: SHIPPED | OUTPATIENT
Start: 2021-01-15 | End: 2021-05-21

## 2021-01-15 RX ORDER — ERGOCALCIFEROL 1.25 MG/1
50000 CAPSULE ORAL WEEKLY
Qty: 12 CAPSULE | Refills: 5 | Status: SHIPPED | OUTPATIENT
Start: 2021-01-15 | End: 2022-03-21

## 2021-01-15 RX ORDER — MONTELUKAST SODIUM 10 MG/1
TABLET ORAL
Qty: 30 TABLET | Refills: 5 | Status: SHIPPED | OUTPATIENT
Start: 2021-01-15

## 2021-01-15 RX ORDER — LORATADINE 10 MG/1
10 TABLET ORAL DAILY
Qty: 30 TABLET | Refills: 5 | Status: SHIPPED | OUTPATIENT
Start: 2021-01-15

## 2021-01-15 RX ORDER — ASPIRIN 81 MG/1
81 TABLET, CHEWABLE ORAL DAILY
COMMUNITY
End: 2021-01-15 | Stop reason: SDUPTHER

## 2021-01-15 RX ORDER — PREDNISONE 10 MG/1
TABLET ORAL
Qty: 31 TABLET | Refills: 0 | Status: SHIPPED | OUTPATIENT
Start: 2021-01-15 | End: 2021-10-14 | Stop reason: ALTCHOICE

## 2021-01-15 RX ORDER — DIPHENHYDRAMINE HCL 25 MG
25 TABLET ORAL EVERY 6 HOURS PRN
Qty: 60 TABLET | Refills: 5 | Status: SHIPPED | OUTPATIENT
Start: 2021-01-15 | End: 2021-12-28 | Stop reason: ALTCHOICE

## 2021-01-15 RX ORDER — PREGABALIN 225 MG/1
CAPSULE ORAL
Qty: 60 CAPSULE | Refills: 5 | Status: SHIPPED | OUTPATIENT
Start: 2021-01-15 | End: 2021-07-20

## 2021-01-15 RX ORDER — PANTOPRAZOLE SODIUM 40 MG/1
TABLET, DELAYED RELEASE ORAL
Qty: 30 TABLET | Refills: 5 | Status: SHIPPED | OUTPATIENT
Start: 2021-01-15 | End: 2021-12-06

## 2021-01-15 RX ORDER — ASPIRIN 81 MG/1
81 TABLET, CHEWABLE ORAL DAILY
Qty: 30 TABLET | Refills: 5 | Status: SHIPPED | OUTPATIENT
Start: 2021-01-15 | End: 2021-12-06

## 2021-01-15 NOTE — PROGRESS NOTES
1/15/2021    TELEHEALTH EVALUATION -- Audio/Visual (During IKPMT-12 public health emergency)    HPI:    Mónica Conn (:  1965) has requested an audio/video evaluation for the following concern(s):    Chief Complaint   Patient presents with    Diabetes     DM ROUTINE FOLLOW UP    Back Pain     BACK PAIN ROUTINE FOLLOW UP       Seen for vertigo   Lipid  - 285/ 175/ 46/ 204  O/w cbc,iron, cmp, looked good    Neuropathy in legs is getting worse - low back pain worsening  Sometimes one leg is worse - pain through hip - burning pain down leg - foot feels like it's on fire - pain in back of thigh on right is worst - intermittent - fires off randomly  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 No f/c/ taste/ smell issues -   Not sure if had covid in past in past.  Seen by dr. Leila Aparicio then dr. Carolyn Joseph did 2nd lumbar surgery   Memory better taking vitamin d  Using albuterol more recently  Sinus/ allergy problems intermittent year round  Eating low carb diet. Took lipitor in past.  Tolerated okay. Vertigo good lately.     Family History   Problem Relation Age of Onset    Other Mother         MENTAL HEALTH     Alcohol Abuse Mother     Mental Illness Mother     Hypertension Father     Elevated Lipids Father     Alcohol Abuse Father     Heart Surgery Father 39        CABG    Diabetes type 2  Father     Dementia Father     Coronary Art Dis Father     Cancer Sister 27        BREAST     Kidney Disease Daughter      Past Surgical History:   Procedure Laterality Date    BACK SURGERY       SECTION      CHOLECYSTECTOMY      HIP ARTHROSCOPY Left     SPINAL FUSION      end of 2018 hardware removed - spinal fusion     UPPER GASTROINTESTINAL ENDOSCOPY N/A 2019    EGD BIOPSY performed by Matt Beaulieu MD at [address] [place] [address]         BP Readings from Last 3 Encounters:   19 110/68   19 138/80 09/13/19 132/85     Pulse Readings from Last 3 Encounters:   09/27/19 76   09/16/19 76   09/13/19 72     Wt Readings from Last 3 Encounters:   09/27/19 196 lb (88.9 kg)   09/16/19 198 lb 9.6 oz (90.1 kg)   08/12/19 197 lb 12.8 oz (89.7 kg)     Lab Results   Component Value Date    LABA1C 5.8 06/26/2019     Lab Results   Component Value Date    .8 06/26/2019         Review of Systems    Prior to Visit Medications    Medication Sig Taking? Authorizing Provider   aspirin 81 MG chewable tablet Take 81 mg by mouth daily Yes Historical Provider, MD   COMBIPATCH 0.05-0.25 MG/DAY APPLY 1 [address] [facility] [address] Yes Mono Horton MD   FREESTYLE LITE strip USE TO TEST 3X A DAY FOR HYPERGLYCEMIA Yes Mono Horton MD   diclofenac sodium (VOLTAREN) 1 % GEL APPLY 2 G TOPICALLY 2 TIMES DAILY Yes Mono Horton MD   pregabalin (LYRICA) 150 MG capsule TAKE 1 CAPSULE BY MOUTH TWICE A DAY Yes Mono Horton MD   ibuprofen (ADVIL;MOTRIN) 800 MG tablet TAKE 1 TABLET BY MOUTH EVERY 8 HOURS AS NEEDED FOR PAIN Yes Mono Horton MD   vitamin D (ERGOCALCIFEROL) 1.25 MG (44251 UT) CAPS capsule Take 1 capsule by mouth once a week for 24 doses Yes Brooke Lorenzana DO   pantoprazole (PROTONIX) 40 MG tablet TAKE 1 TABLET BY MOUTH EVERY DAY BEFORE BREAKFAST Yes Mono Horton MD   EPINEPHrine (EPIPEN) 0.3 MG/0.3ML SOAJ injection Use as directed for allergic reaction Yes Mono Horton MD   diphenhydrAMINE (DIPHENHIST) 25 MG tablet Take 25 mg by mouth every 6 hours as needed for Itching Yes Historical Provider, MD   loratadine (CLARITIN) 10 MG tablet Take 10 mg by mouth daily Yes Historical Provider, MD   albuterol sulfate  (90 Base) MCG/ACT inhaler INHALE 2 PUFFS BY MOUTH EVERY 6 HOURS AS NEEDED FOR WHEEZE  Patient not taking: Reported on 1/15/2021  Mono Horton MD   Emollient (LIBRA) cream Apply topically as needed for Dry Skin Apply topically as needed.   Patient not taking: Reported on 1/15/2021  Mono Horton MD Diclofenac Sodium POWD APPLY 2 GRAMS TOPICALLY TO AFFECTED AREA(S) THREE TIMES DAILY  Patient not taking: Reported on 1/15/2021  RADHA Wise - CNP   montelukast (SINGULAIR) 10 MG tablet TAKE 1 TABLET BY MOUTH EVERY DAY AT NIGHT  Patient not taking: Reported on 1/15/2021  Cassie Welch MD       Social History     Tobacco Use    Smoking status: Former Smoker     Packs/day: 0.25     Years: 35.00     Pack years: 8.75     Types: Cigarettes     Start date: 8/3/1982     Quit date: 2019     Years since quittin.3    Smokeless tobacco: Never Used    Tobacco comment: quit 19, using nicorrette gum   Substance Use Topics    Alcohol use: No     Comment: Sober for 2 years- hx of alcoholism    Drug use: Yes     Types: Marijuana     Comment: 3-4 x week        PHYSICAL EXAMINATION:  [ INSTRUCTIONS:  \"[x]\" Indicates a positive item  \"[]\" Indicates a negative item  -- DELETE ALL ITEMS NOT EXAMINED]  Vital Signs: (As obtained by patient/caregiver or practitioner observation)    Blood pressure-  Heart rate-    Respiratory rate-    Temperature-  Pulse oximetry-     Constitutional: [x] Appears well-developed and well-nourished [] No apparent distress      [] Abnormal-   Mental status  [x] Alert and awake  [] Oriented to person/place/time []Able to follow commands      Eyes:  EOM    []  Normal  [] Abnormal-  Sclera  []  Normal  [] Abnormal -         Discharge []  None visible  [] Abnormal -    HENT:   [x] Normocephalic, atraumatic.   [] Abnormal   [] Mouth/Throat: Mucous membranes are moist.     External Ears [] Normal  [] Abnormal-     Neck: [] No visualized mass     Pulmonary/Chest: [x] Respiratory effort normal.  [] No visualized signs of difficulty breathing or respiratory distress        [] Abnormal-      Musculoskeletal:   [] Normal gait with no signs of ataxia         [] Normal range of motion of neck        [] Abnormal- Neurological:        [x] No Facial Asymmetry (Cranial nerve 7 motor function) (limited exam to video visit)          [] No gaze palsy        [] Abnormal-         Skin:        [x] No significant exanthematous lesions or discoloration noted on facial skin         [] Abnormal-            Psychiatric:       [x] Normal Affect [] No Hallucinations        [] Abnormal-     Other pertinent observable physical exam findings-     ASSESSMENT/PLAN:   Diagnosis Orders   1. Anxiety     2. Lumbar radiculopathy  pregabalin (LYRICA) 225 MG capsule    MRI LUMBAR SPINE W WO CONTRAST    External Referral To Neurosurgery   3. Vitamin D deficiency  vitamin D (ERGOCALCIFEROL) 1.25 MG (12437 UT) CAPS capsule   4. Allergic sinusitis  montelukast (SINGULAIR) 10 MG tablet   5. Simple chronic bronchitis ([address]     6. Prediabetes     7. Chronic GERD     8. Hypercholesteremia       Prednisone taper -se dw/ pt  Increase lyrica 150 bid to 225 bid for nerve pain  Mri lumbar w/ w/o contrast  F/u neurosurgeon w/ hx of multiple surgeries   Cont ppi 3x/week for manuel control  Cont singulair, claritin w/ albuterol prn - fairly stable  covid sxs and risks d/w pt  Diclofenac gel to back   Check labs soon  If LDL >195 again - start back on statin  On low carb diet to lose weight -check a1c though should be good  Cont vit d supplement and check level  On asa 81/d presently -tolerating okay   Mood fairly stable    oarrs reviewed and results c/w rx'd meds  lyrica 150 bid filled 12/24  Diazepam 5 #20 filled 8/1/20    No follow-ups on file. Lucas Morales is a 50 y.o. female being evaluated by a Virtual Visit (video visit) encounter to address concerns as mentioned above. A caregiver was present when appropriate. Due to this being a TeleHealth encounter (During ZKLSZ-36 public health emergency), evaluation of the following organ systems was limited: Vitals/Constitutional/EENT/Resp/CV/GI//MS/Neuro/Skin/Heme-Lymph-Imm. Pursuant to the emergency declaration under the [address] [place] [address] and the [employer] Act, this Virtual Visit was conducted with patient's (and/or legal guardian's) consent, to reduce the patient's risk of exposure to COVID-19 and provide necessary medical care. The patient (and/or legal guardian) has also been advised to contact this office for worsening conditions or problems, and seek emergency medical treatment and/or call 911 if deemed necessary. Patient identification was verified at the start of the visit: Yes    Total time spent on this encounter: 21-30 minutes    Services were provided through a video synchronous discussion virtually to substitute for in-person clinic visit. Patient and provider were located at their individual homes. --Eagle Benoit MD on 1/15/2021 at 3:25 PM    An electronic signature was used to authenticate this note.

## 2021-04-13 RX ORDER — ESTRADIOL/NORETHINDRONE ACETATE TRANSDERMAL SYSTEM .05; .25 MG/D; MG/D
PATCH, EXTENDED RELEASE TRANSDERMAL
Qty: 8 PATCH | Refills: 2 | Status: SHIPPED | OUTPATIENT
Start: 2021-04-13 | End: 2021-07-21

## 2021-04-20 RX ORDER — ALCOHOL ANTISEPTIC PADS
1 PADS, MEDICATED (EA) TOPICAL 2 TIMES DAILY
Qty: 1 EACH | Refills: 2 | Status: SHIPPED | OUTPATIENT
Start: 2021-04-20

## 2021-04-20 RX ORDER — LANCETS 30 GAUGE
1 EACH MISCELLANEOUS 2 TIMES DAILY
Qty: 100 EACH | Refills: 0 | Status: SHIPPED | OUTPATIENT
Start: 2021-04-20 | End: 2021-12-28 | Stop reason: SDUPTHER

## 2021-05-21 RX ORDER — IBUPROFEN 800 MG/1
800 TABLET ORAL EVERY 8 HOURS PRN
Qty: 90 TABLET | Refills: 1 | Status: SHIPPED | OUTPATIENT
Start: 2021-05-21 | End: 2021-08-25

## 2021-05-21 RX ORDER — CYCLOBENZAPRINE HCL 10 MG
TABLET ORAL
Qty: 30 TABLET | Refills: 2 | Status: SHIPPED | OUTPATIENT
Start: 2021-05-21 | End: 2021-09-16

## 2021-05-21 RX ORDER — ALBUTEROL SULFATE 90 UG/1
AEROSOL, METERED RESPIRATORY (INHALATION)
Qty: 8.5 INHALER | Refills: 2 | Status: SHIPPED | OUTPATIENT
Start: 2021-05-21 | End: 2021-09-16

## 2021-07-16 DIAGNOSIS — M54.16 LUMBAR RADICULOPATHY: ICD-10-CM

## 2021-07-19 ENCOUNTER — TELEPHONE (OUTPATIENT)
Dept: FAMILY MEDICINE CLINIC | Age: 56
End: 2021-07-19

## 2021-07-19 NOTE — TELEPHONE ENCOUNTER
Pt would like a vv visit. Her last in office appointment was 9.27.19. Should we have her come into the office?     Please call pt      Looks like she tried to do an e visit on 7-17-21

## 2021-07-19 NOTE — TELEPHONE ENCOUNTER
WELL VISITS ARE IN OFFICE ONLY. WE NEED TO HAVE NON SICK PTS COME IN TO THE OFFICE NOW.   [address] [facility]

## 2021-07-19 NOTE — TELEPHONE ENCOUNTER
CALLED AND LEFT DETAILED MESSAGE FOR PATIENT ADVISING SHE NEEDS AN IN PERSON VISIT FOR MED REFILLS.  SC

## 2021-07-19 NOTE — TELEPHONE ENCOUNTER
----- Message from Anjelica Duncan sent at 7/17/2021 10:33 AM EDT -----  Subject: Appointment Request    Reason for Call: Routine Existing Condition Follow Up    QUESTIONS  Type of Appointment? Established Patient  Reason for appointment request? No appointments available during search  Additional Information for Provider? Patient is requesting a virtual visit   due to transportation. Patient needs a well visit for medication  ---------------------------------------------------------------------------  --------------  CALL BACK INFO  What is the best way for the office to contact you? OK to leave message on   voicemail  Preferred Call Back Phone Number? [phone]  ---------------------------------------------------------------------------  --------------  SCRIPT ANSWERS  Relationship to Patient? Self  Appointment reason? Well Care/Follow Ups  Select a Well Care/Follow Ups appointment reason? Adult Existing Condition   Follow Up [Diabetes, CHF, COPD, Hypertension/Blood Pressure Check]  (Is the patient requesting to be seen urgently for their symptoms?)? No  Is this follow up request related to routine Diabetes Management? No  Are you having any new concerns about your existing condition? No  Have you been diagnosed with, awaiting test results for, or told that you   are suspected of having COVID-19 (Coronavirus)? (If patient has tested   negative or was tested as a requirement for work, school, or travel and   not based on symptoms, answer no)? No  Do you currently have flu-like symptoms including fever or chills, cough,   shortness of breath, difficulty breathing, or new loss of taste or smell? No  Have you had close contact with someone with COVID-19 in the last 14 days? No  (Service Expert  click yes below to proceed with [employer] As Usual   Scheduling)?  Yes

## 2021-07-19 NOTE — TELEPHONE ENCOUNTER
Last OV 5/18/2020 [facility] VV  Next OV None    Chart states that pt had reaction to Voltaren per [address] note 5/18/2020:    voltaren caused swelling.     LMOM for pt to call the office to confirm that this rx was requested./mv

## 2021-07-20 RX ORDER — PREGABALIN 225 MG/1
CAPSULE ORAL
Qty: 60 CAPSULE | Refills: 0 | Status: SHIPPED | OUTPATIENT
Start: 2021-07-20 | End: 2021-08-31

## 2021-07-21 RX ORDER — ESTRADIOL/NORETHINDRONE ACETATE TRANSDERMAL SYSTEM .05; .25 MG/D; MG/D
PATCH, EXTENDED RELEASE TRANSDERMAL
Qty: 8 PATCH | Refills: 1 | Status: SHIPPED | OUTPATIENT
Start: 2021-07-21 | End: 2021-09-16

## 2021-07-22 ENCOUNTER — TELEPHONE (OUTPATIENT)
Dept: FAMILY MEDICINE CLINIC | Age: 56
End: 2021-07-22

## 2021-07-22 DIAGNOSIS — R73.03 PREDIABETES: ICD-10-CM

## 2021-07-22 DIAGNOSIS — Z11.59 ENCOUNTER FOR HEPATITIS C SCREENING TEST FOR LOW RISK PATIENT: ICD-10-CM

## 2021-07-22 DIAGNOSIS — M54.16 LUMBAR RADICULOPATHY: Primary | ICD-10-CM

## 2021-07-22 DIAGNOSIS — E78.00 HYPERCHOLESTEREMIA: ICD-10-CM

## 2021-07-22 DIAGNOSIS — E55.9 VITAMIN D DEFICIENCY: ICD-10-CM

## 2021-07-22 RX ORDER — MECLIZINE HYDROCHLORIDE 25 MG/1
25 TABLET ORAL EVERY 6 HOURS PRN
Qty: 30 TABLET | Refills: 0 | Status: SHIPPED | OUTPATIENT
Start: 2021-07-22 | End: 2021-08-01

## 2021-07-22 NOTE — TELEPHONE ENCOUNTER
----- Message from Rui Wilkes sent at 7/22/2021  2:07 PM EDT -----  Subject: Referral Request    QUESTIONS   Reason for referral request? PT want to schedule an MRI and blood test.   Has the physician seen you for this condition before? No   Preferred Specialist (if applicable)? Do you already have an appointment scheduled? No  Additional Information for Provider? PT wants to schedule a VV due to   COVID concerns. ---------------------------------------------------------------------------  --------------  [facility] INFO  What is the best way for the office to contact you? OK to leave message on   voicemail  Preferred Call Back Phone Number?  [phone]

## 2021-07-22 NOTE — TELEPHONE ENCOUNTER
DISCUSSED WITH ZA SHE HAS TO BE SEEN IN THE OFFICE SHE IS SCHED FOR 8/10 IN OFFICE I OFFERED SEVERAL APPTS NEXT WEEK BUT COULD NOT COME IN. SHE IS ASKING IF WE CAN REFER HER TO [place] FOR BACK ISSUES AND REFILL ANTIVERT TO [facility] ON [place]. Lewis Watkins

## 2021-07-22 NOTE — TELEPHONE ENCOUNTER
----- Message from César Mcconnell sent at 7/22/2021  2:05 PM EDT -----  Subject: Appointment Request    Reason for Call: Urgent (Patient Request) Existing Condition Follow    QUESTIONS  Type of Appointment? Established Patient  Reason for appointment request? Requested Provider unavailable - Bonnie Willson   Additional Information for Provider? PT needs a referral to a neurosurgeon   in [place] due to the long commute with the other referral. PT needs to   update prescription medication. PT wants to know if she has the antibody   to the [employer]. PT is aware of blood test but afraid of leaving the house   due to [employer] concerns. PT wants a VV. Screened green. ---------------------------------------------------------------------------  --------------  Elsa SEO  What is the best way for the office to contact you? OK to leave message on   voicemail  Preferred Call Back Phone Number? [phone]  ---------------------------------------------------------------------------  --------------  SCRIPT ANSWERS  Relationship to Patient? Self  Appointment reason? Well Care/Follow Ups  Select a Well Care/Follow Ups appointment reason? Adult Existing Condition   Follow Up [Diabetes, CHF, COPD, Hypertension/Blood Pressure Check]  (Is the patient requesting to be seen urgently for their symptoms?)? Yes  Is this follow up request related to routine Diabetes Management? Yes  Are you having any new concerns about your existing condition? Yes  Have you been diagnosed with, awaiting test results for, or told that you   are suspected of having COVID-19 (Coronavirus)? (If patient has tested   negative or was tested as a requirement for work, school, or travel and   not based on symptoms, answer no)? No  Do you currently have flu-like symptoms including fever or chills, cough,   shortness of breath, difficulty breathing, or new loss of taste or smell? No  Have you had close contact with someone with COVID-19 in the last 14 days? No  (Service Expert  click yes below to proceed with [employer] As [employer]ual   Scheduling)?  Yes

## 2021-07-23 ENCOUNTER — TELEPHONE (OUTPATIENT)
Dept: FAMILY MEDICINE CLINIC | Age: 56
End: 2021-07-23

## 2021-07-23 NOTE — TELEPHONE ENCOUNTER
CALLED AND SPOKE WITH PT AND ADVISED. SHE NEEDS THIS RE FERAL DUE TO HER BACK ISSUES.  SHE HAS BEEN SEEN FOR THIS MULTIPLE TIMES FOR THIS  SHE STATED

## 2021-07-23 NOTE — TELEPHONE ENCOUNTER
Called derek lee and tried to get faxed number and was unable to obtain. Called and spoke with pt and advised labs and img ordered. Pt would like this order emailed to her. Printed and emailed to pt.  Hs

## 2021-07-23 NOTE — TELEPHONE ENCOUNTER
I don't see a recent mri of lumbar spine. Do you want me to order this first because kathi may not see people w/o reviewing MRI?

## 2021-07-23 NOTE — TELEPHONE ENCOUNTER
CALLED AND SPOKE WITH PT SHE IS OKAY WITH GETTING THE IMAGING DONE. THIS WAS ORDERED IN [facility] BUT PT WAS AFRAID TO LEAVE DUE TO COVID AND SAME WITH BLOOD WORK. PT WILL GO TO [facility] FOR THE MRI     PENDED.  HS

## 2021-07-29 ENCOUNTER — TELEPHONE (OUTPATIENT)
Dept: FAMILY MEDICINE CLINIC | Age: 56
End: 2021-07-29

## 2021-07-29 RX ORDER — ESTRADIOL 1 MG/1
1 TABLET ORAL DAILY
Qty: 12 TABLET | Refills: 0 | Status: SHIPPED | OUTPATIENT
Start: 2021-07-29 | End: 2021-08-31 | Stop reason: ALTCHOICE

## 2021-07-29 RX ORDER — PROGESTERONE 200 MG/1
200 CAPSULE ORAL NIGHTLY
Qty: 12 CAPSULE | Refills: 0 | Status: SHIPPED | OUTPATIENT
Start: 2021-07-29

## 2021-07-29 NOTE — TELEPHONE ENCOUNTER
COMBIPATCH 0.05-0.25 MG/DAY 8 patch 1 7/21/2021     Sig: APPLY 1 PATCH ONTO SKIN TWICE WEEKLY    Sent to pharmacy as: CombiPatch 0.05-0.25 MG/DAY Transdermal Patch Twice Weekly (estradiol-norethindrone)    Notes to Pharmacy: Please make appointment prior to further refills.     E-Prescribing Status: Receipt confirmed by pharmacy (7/21/2021  1:14 PM EDT)

## 2021-07-29 NOTE — TELEPHONE ENCOUNTER
Sent over rx for estradiol/ prometrium to use daily as directed until gets combipatch or until scheduled follow up

## 2021-07-29 NOTE — TELEPHONE ENCOUNTER
Patient called the pharmacy and they are still trying to get the medication COMBIPATCH 0.05-0.25 MG IN, BUT PATIENT NEEDS US TO [place]. Please give her a call back. [facility] has this medication but does not except her insurance. Please give her a call back. [facility] - [address]  Phone no. [phone]

## 2021-08-11 ENCOUNTER — NURSE TRIAGE (OUTPATIENT)
Dept: OTHER | Facility: CLINIC | Age: 56
End: 2021-08-11

## 2021-08-11 ENCOUNTER — TELEPHONE (OUTPATIENT)
Dept: FAMILY MEDICINE CLINIC | Age: 56
End: 2021-08-11

## 2021-08-11 NOTE — TELEPHONE ENCOUNTER
----- Message from Terrell Smith sent at 8/11/2021  1:23 PM EDT -----  Subject: Appointment Request    Reason for Call: Immediate Return from RN Triage    QUESTIONS  Type of Appointment? Established Patient  Reason for appointment request? No appointments available during search  Additional Information for Provider? Return From NT 8.11.21, pt is   experiencing 100.4 fever, rash on face, chest and right arm, had diarrhea overnight, shortness of breath and sent off for a covid test overnight, pt is scheduling virtual visit thru [address] virtual evelyn  ---------------------------------------------------------------------------  --------------  [address]  What is the best way for the office to contact you? OK to leave message on   voicemail  Preferred Call Back Phone Number? [phone]  ---------------------------------------------------------------------------  --------------  SCRIPT ANSWERS  Patient needs to be seen today? Yes  Nurse Name? Erica  Have you been diagnosed with, awaiting test results for, or told that you   are suspected of having COVID-19 (Coronavirus)? (If patient has tested   negative or was tested as a requirement for work, school, or travel and   not based on symptoms, answer no)? Yes  Did your symptoms begin within the past 14 days or was your positive test   result within the past 14 days?  Yes

## 2021-08-11 NOTE — TELEPHONE ENCOUNTER
Received call from Kate Nelson at [facility] with [employer]. Brief description of triage: Patient calling for concerns for possible COVID-19 symptoms. Stated that she started with hot flashes last week but she had ran out of her estrogen patches and thought it might be related to this. On Sunday, she started to have more symptoms. Went to sleep on Sunday night and didn't wake up until Tuesday night. She states that she felt confused last night and was having a hard time using her cell phone. Also reports some shortness of breath on Sunday but denies any shortness of breath today. Today's symptoms:  Red raised rash to face, chest and right arm. Diarrhea last night but no diarrhea this morning. Current fever 100.4F. Still feeling fatigued. Triage indicates for patient to see today in office. Recommending for virtual visit at provider office. If no appointment available, use [place] [address] virtual visit evelyn for virtual visit with provider today. If any worsening of symptoms, any shortness or breath, any chest pain/pressure:  Go to C/ED for evaluation. Any return of confusion:  Call 911 EMS. Care advice provided, patient verbalizes understanding; denies any other questions or concerns; instructed to call back for any new or worsening symptoms. Writer provided warm transfer to Agustin at [facility] for appointment scheduling. Attention Provider: Thank you for allowing me to participate in the care of your patient. The patient was connected to triage in response to information provided to the [facility]. Please do not respond through this encounter as the response is not directed to a shared pool. Reason for Disposition   [1] COVID-19 infection suspected by caller or triager AND [2] mild symptoms (cough, fever, or others) AND [8] no complications or SOB    Answer Assessment - Initial Assessment Questions  1.  COVID-19 DIAGNOSIS: \"Who made your Coronavirus (COVID-19) diagnosis? \" \"Was it confirmed by a positive lab test?\" If not diagnosed by a HCP, ask \"Are there lots of cases (community spread) where you live? \" (See public health department website, if unsure)      Not tested yet    2. COVID-19 EXPOSURE: \"Was there any known exposure to COVID before the symptoms began? \" [facility] Definition of close contact: within 6 feet (2 meters) for a total of 15 minutes or more over a 24-hour period. No known exposures    3. ONSET: \"When did the COVID-19 symptoms start? \"       Last Wednesday 08/04/2021    4. WORST SYMPTOM: \"What is your worst symptom? \" (e.g., cough, fever, shortness of breath, muscle aches)      Hot flashes, fever    5. COUGH: \"Do you have a cough? \" If Yes, ask: \"How bad is the cough? \"        No cough today, had initially    6. FEVER: \"Do you have a fever? \" If Yes, ask: \"What is your temperature, how was it measured, and when did it start? \"      100.4 F recently    7. RESPIRATORY STATUS: \"Describe your breathing? \" (e.g., shortness of breath, wheezing, unable to speak)       Better today, shortness of breath on Sunday    8. BETTER-SAME-WORSE: Elizabeth Stakes you getting better, staying the same or getting worse compared to yesterday? \"  If getting worse, ask, \"In what way? \"      Better    9. HIGH RISK DISEASE: \"Do you have any chronic medical problems? \" (e.g., asthma, heart or lung disease, weak immune system, obesity, etc.)      Smoker, COPD (last used inhalers on Sunday)    10. PREGNANCY: \"Is there any chance you are pregnant? \" \"When was your last menstrual period? \"        Menopause    11. OTHER SYMPTOMS: \"Do you have any other symptoms? \"  (e.g., chills, fatigue, headache, loss of smell or taste, muscle pain, sore throat; new loss of smell or taste especially support the diagnosis of COVID-19)        Fever, fatigue, diarrhea (last night, none this morning, hydrating, voided this morning), rash:  Itchy, 1/4 inch biggest area, red, raised (face, chest, left arm)    Protocols used: CORONAVIRUS (COVID-19) DIAGNOSED OR SUSPECTED-ADULT-[place]

## 2021-08-24 ENCOUNTER — TELEPHONE (OUTPATIENT)
Dept: FAMILY MEDICINE CLINIC | Age: 56
End: 2021-08-24

## 2021-08-25 RX ORDER — IBUPROFEN 800 MG/1
800 TABLET ORAL EVERY 8 HOURS PRN
Qty: 90 TABLET | Refills: 1 | Status: SHIPPED | OUTPATIENT
Start: 2021-08-25 | End: 2021-10-14 | Stop reason: SDUPTHER

## 2021-08-25 NOTE — TELEPHONE ENCOUNTER
CALLED AND SPOKE TO PATIENT. SHE ADVISED ME THAT SHE IS GOING TO [facility] AND THE DATE IS 9/2/21 AT 3:45PM.     [facility]  [address] [address]  IDC 10 [id number]    CALLED GEN AND INITIATED THE PA REQUEST. AFTER GIVING THE LADY ALL THE INFORMATION SHE TOLD ME TO STAY ON THE LINE FOR CASE DETERMINATION. THE AUTOMATIC RESPONSE STATED IT WAS BEING SENT FOR CLINICAL REVIEW. TRACKING # [id number]. WILL WAIT FOR DETERMINATION.  SC

## 2021-08-31 ENCOUNTER — TELEMEDICINE (OUTPATIENT)
Dept: FAMILY MEDICINE CLINIC | Age: 56
End: 2021-08-31
Payer: COMMERCIAL

## 2021-08-31 DIAGNOSIS — E11.69 DIABETES MELLITUS TYPE 2 IN OBESE (HCC): ICD-10-CM

## 2021-08-31 DIAGNOSIS — E66.9 DIABETES MELLITUS TYPE 2 IN OBESE (HCC): ICD-10-CM

## 2021-08-31 DIAGNOSIS — F31.32 BIPOLAR DISORDER WITH MODERATE DEPRESSION (HCC): ICD-10-CM

## 2021-08-31 DIAGNOSIS — N95.1 MENOPAUSAL SYMPTOMS: ICD-10-CM

## 2021-08-31 DIAGNOSIS — M51.37 DEGENERATION OF LUMBOSACRAL INTERVERTEBRAL DISC: ICD-10-CM

## 2021-08-31 DIAGNOSIS — M47.816 LUMBAR SPONDYLOSIS: ICD-10-CM

## 2021-08-31 DIAGNOSIS — F43.10 PTSD (POST-TRAUMATIC STRESS DISORDER): Primary | ICD-10-CM

## 2021-08-31 DIAGNOSIS — F31.70 BIPOLAR AFFECTIVE DISORDER IN REMISSION (HCC): ICD-10-CM

## 2021-08-31 PROCEDURE — 1036F TOBACCO NON-USER: CPT | Performed by: FAMILY MEDICINE

## 2021-08-31 PROCEDURE — 2022F DILAT RTA XM EVC RTNOPTHY: CPT | Performed by: FAMILY MEDICINE

## 2021-08-31 PROCEDURE — 3046F HEMOGLOBIN A1C LEVEL >9.0%: CPT | Performed by: FAMILY MEDICINE

## 2021-08-31 PROCEDURE — G8421 BMI NOT CALCULATED: HCPCS | Performed by: FAMILY MEDICINE

## 2021-08-31 PROCEDURE — G8427 DOCREV CUR MEDS BY ELIG CLIN: HCPCS | Performed by: FAMILY MEDICINE

## 2021-08-31 PROCEDURE — 99214 OFFICE O/P EST MOD 30 MIN: CPT | Performed by: FAMILY MEDICINE

## 2021-08-31 PROCEDURE — 3017F COLORECTAL CA SCREEN DOC REV: CPT | Performed by: FAMILY MEDICINE

## 2021-08-31 RX ORDER — PREGABALIN 150 MG/1
150 CAPSULE ORAL 2 TIMES DAILY
Qty: 60 CAPSULE | Refills: 2 | Status: SHIPPED | OUTPATIENT
Start: 2021-08-31 | End: 2021-12-29

## 2021-08-31 RX ORDER — CHOLECALCIFEROL (VITAMIN D3) 125 MCG
5 CAPSULE ORAL NIGHTLY
Qty: 30 TABLET | Refills: 5 | Status: SHIPPED | OUTPATIENT
Start: 2021-08-31 | End: 2022-03-25

## 2021-08-31 NOTE — PROGRESS NOTES
2021    TELEHEALTH EVALUATION -- Audio/Visual (During SVRXP-67 public health emergency)    HPI:    Philippe Kumari (:  1965) has requested an audio/video evaluation for the following concern(s):    Chief Complaint   Patient presents with    Pain     3 MO PAIN FOLLOW UP      Got job of dreams  Homeless job - serve [employer] - 2nd . 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 Out of lyrica for a month - may have accidentally thrown away  Was working well but not much better w/ 225 and side effects - not lifting as much so back is better  Would like to go to 150 bid dose. Heat rash clearing   Got first covid vaccine - waiting on   Got medical marijuana card - helps for ptsd  combipatch helping menopausal sxs  Lab Results   Component Value Date    LABA1C 5.8 2019     Lab Results   Component Value Date    .8 2019       Review of Systems    Prior to Visit Medications    Medication Sig Taking? Authorizing Provider   ibuprofen (ADVIL;MOTRIN) 800 MG tablet TAKE 1 TABLET BY MOUTH EVERY 8 HOURS AS NEEDED FOR PAIN Yes Maggie Moise MD   progesterone (PROMETRIUM) 200 MG CAPS capsule Take 1 capsule by mouth nightly Yes Maggie Moise MD   Dorian 0.05-0.25 MG/DAY APPLY 1 PATCH ONTO SKIN TWICE WEEKLY Yes Maggie Moise MD   pregabalin (LYRICA) 225 MG capsule TAKE 1 CAPSULE BY MOUTH TWICE A DAY Yes Maggie Moise MD   cyclobenzaprine (FLEXERIL) 10 MG tablet TAKE 1 TABLET BY MOUTH THREE TIMES A DAY AS NEEDED FOR MUSCLE SPASMS Yes RADHA Winters CNP   albuterol sulfate  (90 Base) MCG/ACT inhaler INHALE 2 PUFFS BY MOUTH EVERY 6 HOURS AS NEEDED FOR WHEEZING **NEED APPOINTMENT** Yes RADHA Winters CNP   blood glucose monitor kit and supplies Dispense sufficient amount for indicated testing frequency plus additional to accommodate PRN testing needs.  Dispense all needed supplies to include: monitor, strips, lancing device, lancets, control solutions, alcohol swabs. Yes Mariann Jorge MD   blood glucose test strips (ASCENSIA AUTODISC VI;ONE TOUCH ULTRA TEST VI) strip 1 each by In Vitro route daily Test twice daily Yes Mariann Jorge MD   Lancets MISC 1 each by Does not apply route 2 times daily Yes Mariann Jorge MD   UltiCare Alcohol Swabs 70 % PADS 1 each by Does not apply route 2 times daily Yes Mariann Jorge MD   aspirin 81 MG chewable tablet Take 1 tablet by mouth daily Yes Mariann Jorge MD   vitamin D (ERGOCALCIFEROL) 1.25 MG (82243 UT) CAPS capsule Take 1 capsule by mouth once a week for 24 doses Yes Mariann Jorge MD   pantoprazole (PROTONIX) 40 MG tablet TAKE 1 TABLET BY MOUTH EVERY DAY BEFORE BREAKFAST Yes Mariann Jorge MD   diphenhydrAMINE (DIPHENHIST) 25 MG tablet Take 1 tablet by mouth every 6 hours as needed for Allergies Yes Mariann Jorge MD   loratadine (CLARITIN) 10 MG tablet Take 1 tablet by mouth daily Yes Mariann Jorge MD   montelukast (SINGULAIR) 10 MG tablet TAKE 1 TABLET BY MOUTH EVERY DAY AT NIGHT Yes Mariann Jorge MD   diclofenac sodium (VOLTAREN) 1 % GEL APPLY 2 G TOPICALLY 2 TIMES DAILY Yes Mariann Jorge MD   Emollient (LIBRA) cream Apply topically as needed for Dry Skin Apply topically as needed.  Yes Mariann Jorge MD   Diclofenac Sodium POWD APPLY 2 GRAMS TOPICALLY TO AFFECTED AREA(S) THREE TIMES DAILY Yes RADHA Lawton CNP   EPINEPHrine (EPIPEN) 0.3 MG/0.3ML SOAJ injection Use as directed for allergic reaction Yes Mariann Jorge MD   predniSONE (DELTASONE) 10 MG tablet 5/d for 2 then 4/d for 2 then 3/d for 2 then 2/d for 2 then 1/d for 2 then 1/2/d for 2 w/ food in am.  Patient not taking: Reported on 8/31/2021  Mariann Jorge MD   FREESTYLE LITE strip USE TO TEST 3X A DAY FOR HYPERGLYCEMIA  Mariann Jorge MD       Social History     Tobacco Use    Smoking status: Former Smoker     Packs/day: 0.25     Years: 35.00     Pack years: 8.75     Types: Cigarettes     Start date: 8/3/1982     Quit date: 2019     Years since quittin.9    Smokeless tobacco: Never Used    Tobacco comment: quit 19, using nicorrette gum   Vaping Use    Vaping Use: Never used   Substance Use Topics    Alcohol use: No     Comment: Sober for 2 years- hx of alcoholism    Drug use: Yes     Types: Marijuana     Comment: 3-4 x week        PHYSICAL EXAMINATION:  [ INSTRUCTIONS:  \"[x]\" Indicates a positive item  \"[]\" Indicates a negative item  -- DELETE ALL ITEMS NOT EXAMINED]  Vital Signs: (As obtained by patient/caregiver or practitioner observation)    Blood pressure-  Heart rate-    Respiratory rate-    Temperature-  Pulse oximetry-     Constitutional: [x] Appears well-developed and well-nourished [] No apparent distress      [] Abnormal-   Mental status  [x] Alert and awake  [] Oriented to person/place/time []Able to follow commands      Eyes:  EOM    []  Normal  [] Abnormal-  Sclera  []  Normal  [] Abnormal -         Discharge []  None visible  [] Abnormal -    HENT:   [x] Normocephalic, atraumatic. [] Abnormal   [] Mouth/Throat: Mucous membranes are moist.     External Ears [] Normal  [] Abnormal-     Neck: [] No visualized mass     Pulmonary/Chest: [x] Respiratory effort normal.  [] No visualized signs of difficulty breathing or respiratory distress        [] Abnormal-      Musculoskeletal:   [] Normal gait with no signs of ataxia         [] Normal range of motion of neck        [] Abnormal-       Neurological:        [x] No Facial Asymmetry (Cranial nerve 7 motor function) (limited exam to video visit)          [] No gaze palsy        [] Abnormal-         Skin:        [x] No significant exanthematous lesions or discoloration noted on facial skin         [] Abnormal-            Psychiatric:       [x] Normal Affect [] No Hallucinations        [] Abnormal-     Other pertinent observable physical exam findings-     ASSESSMENT/PLAN:  There are no diagnoses linked to this encounter. Diagnosis Orders   1. PTSD (post-traumatic stress disorder)     2. Bipolar disorder with moderate depression ([facility]     3. Diabetes mellitus type 2 in obese ([facility]     4. Lumbar spondylosis     5. Bipolar affective disorder in remission ([facility]     6. Degeneration of lumbosacral intervertebral disc     7. 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 The patient (or guardian if applicable) is aware that this is a billable service. Verbal consent to proceed has been obtained within the past 12 months. The visit was conducted pursuant to the emergency declaration under the [address] [place] [address] authority and the [employer] General Act. Patient identification was verified, and a caregiver was present when appropriate. The patient was located in a state where the provider was credentialed to provide care. Total time spent on this encounter: 21 or more minutes were spent on the digital evaluation and management of this patient. --Stanislav Andrade MD on 8/31/2021 at 3:17 PM    An electronic signature was used to authenticate this note.

## 2021-09-16 RX ORDER — CYCLOBENZAPRINE HCL 10 MG
TABLET ORAL
Qty: 30 TABLET | Refills: 2 | Status: SHIPPED | OUTPATIENT
Start: 2021-09-16 | End: 2021-12-06

## 2021-09-16 RX ORDER — ALBUTEROL SULFATE 90 UG/1
AEROSOL, METERED RESPIRATORY (INHALATION)
Qty: 8.5 EACH | Refills: 2 | Status: SHIPPED | OUTPATIENT
Start: 2021-09-16 | End: 2021-11-29

## 2021-10-12 ENCOUNTER — TELEPHONE (OUTPATIENT)
Dept: FAMILY MEDICINE CLINIC | Age: 56
End: 2021-10-12

## 2021-10-12 RX ORDER — HYDROXYZINE HYDROCHLORIDE 25 MG/1
25-50 TABLET, FILM COATED ORAL EVERY 8 HOURS PRN
Qty: 120 TABLET | Refills: 0 | Status: SHIPPED | OUTPATIENT
Start: 2021-10-12 | End: 2021-10-14 | Stop reason: ALTCHOICE

## 2021-10-12 NOTE — TELEPHONE ENCOUNTER
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 She would like a script for ativan called in for her       [id number] [place], [address]  Joe Lopez [phone] - F [phone]

## 2021-10-13 NOTE — TELEPHONE ENCOUNTER
Patient stated she can not take the hydroxyzine due to it causing her blood pressure to drop to low and passes out

## 2021-10-14 ENCOUNTER — VIRTUAL VISIT (OUTPATIENT)
Dept: FAMILY MEDICINE CLINIC | Age: 56
End: 2021-10-14
Payer: COMMERCIAL

## 2021-10-14 DIAGNOSIS — R10.2 PELVIC PAIN: ICD-10-CM

## 2021-10-14 DIAGNOSIS — M51.36 DDD (DEGENERATIVE DISC DISEASE), LUMBAR: ICD-10-CM

## 2021-10-14 DIAGNOSIS — M54.16 LUMBAR RADICULOPATHY: ICD-10-CM

## 2021-10-14 DIAGNOSIS — F41.9 ANXIETY: Primary | ICD-10-CM

## 2021-10-14 DIAGNOSIS — F10.11 HISTORY OF ALCOHOL ABUSE: ICD-10-CM

## 2021-10-14 DIAGNOSIS — F43.10 PTSD (POST-TRAUMATIC STRESS DISORDER): ICD-10-CM

## 2021-10-14 PROCEDURE — G8427 DOCREV CUR MEDS BY ELIG CLIN: HCPCS | Performed by: FAMILY MEDICINE

## 2021-10-14 PROCEDURE — G8484 FLU IMMUNIZE NO ADMIN: HCPCS | Performed by: FAMILY MEDICINE

## 2021-10-14 PROCEDURE — 1036F TOBACCO NON-USER: CPT | Performed by: FAMILY MEDICINE

## 2021-10-14 PROCEDURE — 99214 OFFICE O/P EST MOD 30 MIN: CPT | Performed by: FAMILY MEDICINE

## 2021-10-14 PROCEDURE — 3017F COLORECTAL CA SCREEN DOC REV: CPT | Performed by: FAMILY MEDICINE

## 2021-10-14 PROCEDURE — G8421 BMI NOT CALCULATED: HCPCS | Performed by: FAMILY MEDICINE

## 2021-10-14 RX ORDER — IBUPROFEN 800 MG/1
800 TABLET ORAL EVERY 8 HOURS PRN
Qty: 90 TABLET | Refills: 1 | Status: SHIPPED | OUTPATIENT
Start: 2021-10-14

## 2021-10-14 RX ORDER — LORAZEPAM 1 MG/1
.5-1 TABLET ORAL DAILY PRN
Qty: 10 TABLET | Refills: 1 | Status: SHIPPED | OUTPATIENT
Start: 2021-10-14 | End: 2021-12-06

## 2021-10-14 RX ORDER — ESTRADIOL/NORETHINDRONE ACETATE TRANSDERMAL SYSTEM .05; .25 MG/D; MG/D
PATCH, EXTENDED RELEASE TRANSDERMAL
Qty: 8 PATCH | Refills: 1 | Status: SHIPPED | OUTPATIENT
Start: 2021-10-14 | End: 2021-12-28 | Stop reason: SDUPTHER

## 2021-10-14 NOTE — PROGRESS NOTES
10/14/2021    TELEHEALTH EVALUATION -- Audio/Visual (During UVLMZ-16 public health emergency)    HPI:    Ramiro Burkett (:  1965) has requested an audio/video evaluation for the following concern(s):    Chief Complaint   Patient presents with    Anxiety     WANTS TO DISCUSS ANXIETY, SHE IS READY TO QUIT HER \"DREAM JOB\" BECAUSE OF THE STRESS SHE WATCHES A LOT OF PEOPLE OVERDOSE ALMOST DAILY      Seen in ER  for pelvic pain - w/u unremarkable - attributed to constipation - much better. Had trouble w/ hormone interruption for a week - started periods again. 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 Had  in past and therapist - not recently  Had bad therapy relation in past.  Has taken a lot of different meds in past - can't tolerate hydroxyzine - bp effects. Needs bp monitor - high in past - lower lately. BP Readings from Last 3 Encounters:   19 110/68   19 138/80   19 132/85     Not drunk for 3-4 years - had one drink 6 months ago. Tried klonopin/ diazepam in past but ativan seems to work best.  Medical marijuana not helpful recently. Didn't get mri lumbar denied as needed more information and never heard anything more about it  Needs combipatch which helps  Back doing better as less lifting  Uses ibuprofen prn - helps and tolerates well  Would like ativan prn   Hx of 2 back surgeries - may need another - having radicular sxs into both legs - worse since first lumbar surgery  Hasn't done mmg yet  Review of Systems    Prior to Visit Medications    Medication Sig Taking?  Authorizing Provider   cyclobenzaprine (FLEXERIL) 10 MG tablet TAKE 1 TABLET BY MOUTH THREE TIMES A DAY AS NEEDED FOR MUSCLE SPASMS Yes RADHA Alonso - CNP   COMBIPATCH 0.05-0.25 MG/DAY APPLY 1 PATCH ONTO SKIN TWICE WEEKLY Yes Chris Abreu MD   albuterol sulfate  (90 Base) MCG/ACT inhaler INHALE 2 PUFFS BY MOUTH EVERY 6 HOURS AS NEEDED FOR WHEEZING **NEED APPOINTMENT** Yes RADHA Alonso CNP   melatonin 5 MG TABS tablet Take 1 tablet by mouth nightly Yes Chris Abreu MD   pregabalin (LYRICA) 150 MG capsule Take 1 capsule by mouth 2 times daily for 30 days. Yes Chris Abreu MD   ibuprofen (ADVIL;MOTRIN) 800 MG tablet TAKE 1 TABLET BY MOUTH EVERY 8 HOURS AS NEEDED FOR PAIN Yes Chris Abreu MD   blood glucose monitor kit and supplies Dispense sufficient amount for indicated testing frequency plus additional to accommodate PRN testing needs. Dispense all needed supplies to include: monitor, strips, lancing device, lancets, control solutions, alcohol swabs. Yes Chris Abreu MD   blood glucose test strips (ASCENSIA AUTODISC VI;ONE TOUCH ULTRA TEST VI) strip 1 each by In Vitro route daily Test twice daily Yes Chris Abreu MD   Lancets MISC 1 each by Does not apply route 2 times daily Yes Chris Abreu MD   UltiCare Alcohol Swabs 70 % PADS 1 each by Does not apply route 2 times daily Yes Chris Abreu MD   vitamin D (ERGOCALCIFEROL) 1.25 MG (11555 UT) CAPS capsule Take 1 capsule by mouth once a week for 24 doses Yes Chris Abreu MD   pantoprazole (PROTONIX) 40 MG tablet TAKE 1 TABLET BY MOUTH EVERY DAY BEFORE BREAKFAST Yes Chris Abreu MD   diphenhydrAMINE (DIPHENHIST) 25 MG tablet Take 1 tablet by mouth every 6 hours as needed for Allergies Yes Chris Abreu MD   loratadine (CLARITIN) 10 MG tablet Take 1 tablet by mouth daily Yes Chris Abreu MD   diclofenac sodium (VOLTAREN) 1 % GEL APPLY 2 G TOPICALLY 2 TIMES DAILY Yes Chris Abreu MD   Emollient (LIBRA) cream Apply topically as needed for Dry Skin Apply topically as needed.  Yes Chris Abreu MD   Diclofenac Sodium POWD APPLY 2 GRAMS TOPICALLY TO AFFECTED AREA(S) THREE TIMES DAILY Yes RADHA Neely - CNP   EPINEPHrine (EPIPEN) 0.3 MG/0.3ML SOAJ injection Use as directed for allergic reaction Yes Mode Bundy MD   progesterone (PROMETRIUM) 200 MG CAPS capsule Take 1 capsule by mouth nightly  Mode Bundy MD   aspirin 81 MG chewable tablet Take 1 tablet by mouth daily  Patient not taking: Reported on 10/14/2021  Mode Bundy MD   montelukast (SINGULAIR) 10 MG tablet TAKE 1 TABLET BY MOUTH EVERY DAY AT NIGHT  Patient not taking: Reported on 10/14/2021  Mode Bundy MD       Social History     Tobacco Use    Smoking status: Former Smoker     Packs/day: 0.25     Years: 35.00     Pack years: 8.75     Types: Cigarettes     Start date: 8/3/1982     Quit date: 2019     Years since quittin.0    Smokeless tobacco: Never Used    Tobacco comment: quit 19, using nicorrette gum   Vaping Use    Vaping Use: Never used   Substance Use Topics    Alcohol use: No     Comment: Sober for 2 years- hx of alcoholism    Drug use: Yes     Types: Marijuana     Comment: 3-4 x week            PHYSICAL EXAMINATION:  [ INSTRUCTIONS:  \"[x]\" Indicates a positive item  \"[]\" Indicates a negative item  -- DELETE ALL ITEMS NOT EXAMINED]  Vital Signs: (As obtained by patient/caregiver or practitioner observation)    Blood pressure-  Heart rate-    Respiratory rate-    Temperature-  Pulse oximetry-     Constitutional: [x] Appears well-developed and well-nourished [] No apparent distress      [] Abnormal-   Mental status  [x] Alert and awake  [] Oriented to person/place/time []Able to follow commands      Eyes:  EOM    []  Normal  [] Abnormal-  Sclera  []  Normal  [] Abnormal -         Discharge []  None visible  [] Abnormal -    HENT:   [x] Normocephalic, atraumatic.   [] Abnormal   [] Mouth/Throat: Mucous membranes are moist.     External Ears [] Normal  [] Abnormal-     Neck: [] No visualized mass     Pulmonary/Chest: [x] Respiratory effort normal.  [] No visualized signs of difficulty breathing or respiratory distress        [] Abnormal-      Musculoskeletal:   [] Normal gait with no signs of ataxia         [] Normal range of motion of neck        [] Abnormal-       Neurological:        [x] No Facial Asymmetry (Cranial nerve 7 motor function) (limited exam to video visit)          [] No gaze palsy        [] Abnormal-         Skin:        [x] No significant exanthematous lesions or discoloration noted on facial skin         [] Abnormal-            Psychiatric:       [x] Normal Affect [] No Hallucinations        [] Abnormal-     Other pertinent observable physical exam findings-     ASSESSMENT/PLAN:   Diagnosis Orders   1. Anxiety     2. Pelvic pain     3. PTSD (post-traumatic stress disorder)     4. History of alcohol abuse     MRI lumbar and f/u spine surgeon - can't see dr. Vesta Ham any longer 2/2 insurance change - refer kathi  mmg soon and gyn appointment  Refill combivent as working well  Ibuprofen prn  Pelvic pain improved - constipation better  Encourage therapist though pt hesitant - has not done well w/ ssri/snri in past  D/w pt w/ medical marijuana and hx of substance abuse will limit rx to 10 1mg ativan/ 30 days. Will need to see psychiatry if needs more for anxiety  F/u 2 months/ prn  Check labs    oarrs reviewed and results c/w rx'd meds  Got 10-12 norco 2/26 and 9/18  lyrica 150 bid filled 8/31  Prasad Aguirre, was evaluated through a synchronous (real-time) audio-video encounter. The patient (or guardian if applicable) is aware that this is a billable service. Verbal consent to proceed has been obtained within the past 12 months. The visit was conducted pursuant to the emergency declaration under the [facility], [address] [place] [address] authority and the [employer] General Act. Patient identification was verified, and a caregiver was present when appropriate.  The patient was located in a

## 2021-10-15 ENCOUNTER — TELEPHONE (OUTPATIENT)
Dept: FAMILY MEDICINE CLINIC | Age: 56
End: 2021-10-15

## 2021-10-15 DIAGNOSIS — R03.0 ELEVATED BLOOD-PRESSURE READING WITHOUT DIAGNOSIS OF HYPERTENSION: Primary | ICD-10-CM

## 2021-10-15 RX ORDER — BLOOD PRESSURE TEST KIT
KIT MISCELLANEOUS
COMMUNITY
End: 2021-10-15 | Stop reason: SDUPTHER

## 2021-10-15 RX ORDER — BLOOD PRESSURE TEST KIT
KIT MISCELLANEOUS
Qty: 1 KIT | Refills: 0 | Status: SHIPPED | OUTPATIENT
Start: 2021-10-15

## 2021-10-15 NOTE — TELEPHONE ENCOUNTER
Patient would like a script for combivent inhaler and a blood pressure monitor sent in for her. [id number]- [place] [address] [address].  Kranthi Self [phone] - F [phone]

## 2021-10-15 NOTE — TELEPHONE ENCOUNTER
Bp monitor sent. I sent in albuterol last month for asthma. I have never given her combivent before - can't use these two together. Is albuterol not working?

## 2021-10-18 NOTE — TELEPHONE ENCOUNTER
PATIENT STATES SHE WOULD LIKE TO BE ON A DAILY INHALER ON TOP OF THE ALBUTEROL. SHE STATES ALBUTEROL IS STILL WORKING BUT USED TO BE ON A DAILY AS WELL AND CAN'T REMEMBER WHICH ONE.  WILL WAIT FOR [facility] TO ADDRESS TOMORROW. ANICETO

## 2021-10-18 NOTE — TELEPHONE ENCOUNTER
CALLED PATIENT ADVISED. PATIENT STATED SHE HAD TO STOP TAKING THIS DUE TO INSURANCE NOT COVERING.  ADVISED WE WOULD DO A PA IF NEEDED IF PHARMACY REACHED OUT. ANICETO

## 2021-11-29 RX ORDER — ALBUTEROL SULFATE 90 UG/1
AEROSOL, METERED RESPIRATORY (INHALATION)
Qty: 8.5 EACH | Refills: 2 | Status: SHIPPED | OUTPATIENT
Start: 2021-11-29 | End: 2022-06-02

## 2021-12-04 DIAGNOSIS — F41.9 ANXIETY: ICD-10-CM

## 2021-12-06 RX ORDER — PANTOPRAZOLE SODIUM 40 MG/1
TABLET, DELAYED RELEASE ORAL
Qty: 30 TABLET | Refills: 5 | Status: SHIPPED | OUTPATIENT
Start: 2021-12-06

## 2021-12-06 RX ORDER — ASPIRIN 81 MG
TABLET,CHEWABLE ORAL
Qty: 100 TABLET | Refills: 0 | Status: SHIPPED | OUTPATIENT
Start: 2021-12-06 | End: 2022-03-10

## 2021-12-06 RX ORDER — LORAZEPAM 1 MG/1
TABLET ORAL
Qty: 10 TABLET | Refills: 0 | Status: SHIPPED | OUTPATIENT
Start: 2021-12-11 | End: 2022-01-25

## 2021-12-06 RX ORDER — CYCLOBENZAPRINE HCL 10 MG
TABLET ORAL
Qty: 30 TABLET | Refills: 2 | Status: SHIPPED | OUTPATIENT
Start: 2021-12-06 | End: 2022-06-27

## 2021-12-06 NOTE — TELEPHONE ENCOUNTER
LAST VISIT 10/14/2021 WITH DR LOPEZ VIRTUALLY, NEXT VISIT NONE. [facility]       Encourage therapist though pt hesitant - has not done well w/ ssri/snri in past  D/w pt w/ medical marijuana and hx of substance abuse will limit rx to 10 1mg ativan/ 30 days. Will need to see psychiatry if needs more for anxiety  F/u 2 months/ prn    LAST UDS 03/07/20219, LAST MED CONTRACT NONE. [facility]     Disp Refills Start End    LORazepam (ATIVAN) 1 MG tablet 10 tablet 1 10/14/2021 11/13/2021    Sig - Route:  Take 0.5-1 tablets by mouth daily as needed for Anxiety for up to 30 days. - Oral    Sent to pharmacy as: LORazepam 1 MG Oral Tablet (ATIVAN)    E-Prescribing Status: Receipt confirmed by pharmacy (10/14/2021  1:30 PM EDT)

## 2021-12-27 NOTE — TELEPHONE ENCOUNTER
----- Message from Amaris Hays sent at 12/27/2021 12:00 PM EST -----  Subject: Message to Provider    QUESTIONS  Information for Provider? patient called in states that the wrong test   strips were called in it should be the one touch ultra please call patient  ---------------------------------------------------------------------------  --------------  [address]  What is the best way for the office to contact you? OK to leave message on   voicemail  Preferred Call Back Phone Number? [phone]  ---------------------------------------------------------------------------  --------------  SCRIPT ANSWERS  Relationship to Patient?  Self

## 2021-12-28 ENCOUNTER — VIRTUAL VISIT (OUTPATIENT)
Dept: FAMILY MEDICINE CLINIC | Age: 56
End: 2021-12-28
Payer: COMMERCIAL

## 2021-12-28 DIAGNOSIS — N95.1 MENOPAUSAL SYMPTOMS: ICD-10-CM

## 2021-12-28 DIAGNOSIS — Z12.11 SCREENING FOR MALIGNANT NEOPLASM OF COLON: ICD-10-CM

## 2021-12-28 DIAGNOSIS — E66.9 DIABETES MELLITUS TYPE 2 IN OBESE (HCC): ICD-10-CM

## 2021-12-28 DIAGNOSIS — E11.69 DIABETES MELLITUS TYPE 2 IN OBESE (HCC): ICD-10-CM

## 2021-12-28 DIAGNOSIS — M51.37 DEGENERATION OF LUMBOSACRAL INTERVERTEBRAL DISC: ICD-10-CM

## 2021-12-28 DIAGNOSIS — M47.816 LUMBAR SPONDYLOSIS: ICD-10-CM

## 2021-12-28 DIAGNOSIS — J01.41 ACUTE RECURRENT PANSINUSITIS: Primary | ICD-10-CM

## 2021-12-28 DIAGNOSIS — Z12.31 ENCOUNTER FOR SCREENING MAMMOGRAM FOR MALIGNANT NEOPLASM OF BREAST: ICD-10-CM

## 2021-12-28 DIAGNOSIS — R05.9 COUGH: ICD-10-CM

## 2021-12-28 PROCEDURE — 3017F COLORECTAL CA SCREEN DOC REV: CPT | Performed by: NURSE PRACTITIONER

## 2021-12-28 PROCEDURE — 99214 OFFICE O/P EST MOD 30 MIN: CPT | Performed by: NURSE PRACTITIONER

## 2021-12-28 PROCEDURE — G8427 DOCREV CUR MEDS BY ELIG CLIN: HCPCS | Performed by: NURSE PRACTITIONER

## 2021-12-28 PROCEDURE — G8421 BMI NOT CALCULATED: HCPCS | Performed by: NURSE PRACTITIONER

## 2021-12-28 PROCEDURE — G8484 FLU IMMUNIZE NO ADMIN: HCPCS | Performed by: NURSE PRACTITIONER

## 2021-12-28 PROCEDURE — 4004F PT TOBACCO SCREEN RCVD TLK: CPT | Performed by: NURSE PRACTITIONER

## 2021-12-28 PROCEDURE — 2022F DILAT RTA XM EVC RTNOPTHY: CPT | Performed by: NURSE PRACTITIONER

## 2021-12-28 RX ORDER — GLUCOSAMINE HCL/CHONDROITIN SU 500-400 MG
CAPSULE ORAL
Qty: 100 STRIP | Refills: 11 | Status: SHIPPED | OUTPATIENT
Start: 2021-12-28

## 2021-12-28 RX ORDER — ESTRADIOL/NORETHINDRONE ACETATE TRANSDERMAL SYSTEM .05; .25 MG/D; MG/D
PATCH, EXTENDED RELEASE TRANSDERMAL
Qty: 8 PATCH | Refills: 1 | Status: SHIPPED | OUTPATIENT
Start: 2021-12-28 | End: 2022-03-22

## 2021-12-28 RX ORDER — DOXYCYCLINE HYCLATE 100 MG
100 TABLET ORAL 2 TIMES DAILY
Qty: 14 TABLET | Refills: 0 | Status: SHIPPED | OUTPATIENT
Start: 2021-12-28 | End: 2022-01-04

## 2021-12-28 RX ORDER — LANCETS 30 GAUGE
1 EACH MISCELLANEOUS 2 TIMES DAILY
Qty: 100 EACH | Refills: 5 | Status: SHIPPED | OUTPATIENT
Start: 2021-12-28

## 2021-12-28 RX ORDER — BENZONATATE 100 MG/1
100 CAPSULE ORAL 3 TIMES DAILY PRN
Qty: 30 CAPSULE | Refills: 0 | Status: SHIPPED | OUTPATIENT
Start: 2021-12-28 | End: 2022-01-04

## 2021-12-28 ASSESSMENT — ENCOUNTER SYMPTOMS
CONSTIPATION: 0
ABDOMINAL PAIN: 0
DIARRHEA: 0
BACK PAIN: 0
COUGH: 1
SINUS PRESSURE: 1
SINUS PAIN: 1
SHORTNESS OF BREATH: 0
CHEST TIGHTNESS: 0
VOICE CHANGE: 1
EYE DISCHARGE: 0
COLOR CHANGE: 0
NAUSEA: 0
ABDOMINAL DISTENTION: 0
VOMITING: 1

## 2021-12-28 ASSESSMENT — PATIENT HEALTH QUESTIONNAIRE - PHQ9
SUM OF ALL RESPONSES TO PHQ9 QUESTIONS 1 & 2: 2
SUM OF ALL RESPONSES TO PHQ QUESTIONS 1-9: 2
2. FEELING DOWN, DEPRESSED OR HOPELESS: 1
1. LITTLE INTEREST OR PLEASURE IN DOING THINGS: 1
SUM OF ALL RESPONSES TO PHQ QUESTIONS 1-9: 2
SUM OF ALL RESPONSES TO PHQ QUESTIONS 1-9: 2

## 2021-12-28 NOTE — PROGRESS NOTES
Lacey Ly (:  1965) is a 49 y.o. female,Established patient, here for evaluation of the following chief complaint(s): Cough (COUGH CONGESTION STARTED IN AUGUST SHE CANNOT GET RID OF IT, SHE TOOK BACTRIM THAT SHE HAD LEFT OVER THEY HELPED SOME BUT ONLY HAD 2 PILLS [facility] [address] )      Patient identification was verified at the start of the visit: Yes    Patient located for today's visit in [place]: Yes    ASSESSMENT/PLAN:  1. Acute recurrent pansinusitis  -     doxycycline hyclate (VIBRA-TABS) 100 MG tablet; Take 1 tablet by mouth 2 times daily for 7 days, Disp-14 tablet, R-0Normal   Consider ENT referral after this abx if persists   Recommend daily PO allergy medication OTC and flonase nasal spray   Sinus rinses   Hot showers   Mucinex BID   Hot showers   Vicks vapo rub to chest, under nares   Sinus rinse as needed   Push fluids   Cough and deep breathe   OTC Cough suppressant at night to aid with sleep   If breathing issues, cough and deep breathe, lay on belly (prone) to aid with oxygenating lungs if possibly COVID  2. Cough  -     doxycycline hyclate (VIBRA-TABS) 100 MG tablet; Take 1 tablet by mouth 2 times daily for 7 days, Disp-14 tablet, R-0Normal  -     benzonatate (TESSALON) 100 MG capsule; Take 1 capsule by mouth 3 times daily as needed for Cough, Disp-30 capsule, R-0Normal   Pt requested codeine cough syrup, has tried OTC cough meds   Give tessalone pearls and abx   No codeine cough syrup at this time   Reviewed OARRS, not listed in hx - on lyrica and ativan  3. Encounter for screening mammogram for malignant neoplasm of breast   -     YEMI DIGITAL SCREEN W OR WO CAD BILATERAL; Future    Please call [address] [place] [address] to schedule your mammogram or [phone] for mammogram Roberto Wiley   Information printed and reviewed   Aware she is overdue   Issues with transportation  4.  Screening for malignant neoplasm of colon  -     COLOGUARD (FECAL DNA COLORECTAL CANCER SCREENING)   Discussed colonoscopy is gold standard and pt still declined testing/referral   Willing to do stool study   Information printed and reviewed  5. Diabetes mellitus type 2 in obese ([address]  -     Lancets MISC; 2 TIMES DAILY Starting Tue 12/28/2021, Disp-100 each, R-5, Normal  -     blood glucose monitor strips; TESTING BID E11.9 INSURANCE PREFERRED ONE TOUCH ULTA, Disp-100 strip, R-11, Normal   Overdue for in person appt, needs A1C checked  6. Menopausal symptoms  -     estradiol-norethindrone (COMBIPATCH) 0.05-0.25 MG/DAY; Apply as directed twice weekly, Disp-8 patch, R-1Normal   Requested refills, managed well on patch     Return in about 1 month (around 1/28/2022) for Diabetes Follow Up, Physical Exam and Labs, Anxiety Follow Up- needs in person visit. SUBJECTIVE/OBJECTIVE:  HPI     Chief Complaint   Patient presents with    Cough     COUGH CONGESTION STARTED IN AUGUST SHE CANNOT GET RID OF IT, SHE TOOK BACTRIM THAT SHE HAD LEFT OVER THEY HELPED SOME BUT ONLY HAD 2 PILLS [facility] [address] [place]      Upper Respiratory Infection  Patient complains of symptoms of a URI. Symptoms include cough, chest and nasal congestion. Onset of symptoms was 4 months ago, unchanged since that time. She also c/o post nasal drip for the past 4 months . She is drinking plenty of fluids. Evaluation to date: none. Treatment to date: bactrim a couple days, zyrtec, claritin. Pt has been COVID tested numerous times, works in [employer]. Vaccinated, never had COVID. Voice is hoarse. Pt had bactrim from rash in August 2021 and did not take it in its entirety and so she has tried that the past few days and maybe feel a little bit better. Discussed this would be unusual treatment for URI. Pt has tried OTC allergy medications. Has numerous cats. Also recently returned on vacation, worse in [address] [place] [address] so does not feel it is related to her \"zoo at home. \" Last abx 4 months ago otherwise.  Pt has also tried leftover steroids from her back issues for this sinus issues. Review of Systems   Constitutional: Negative for activity change, appetite change, fatigue, fever and unexpected weight change. HENT: Positive for congestion, sinus pressure, sinus pain and voice change. Negative for ear pain. Eyes: Negative for discharge and visual disturbance. Respiratory: Positive for cough. Negative for chest tightness and shortness of breath. Cardiovascular: Negative for chest pain, palpitations and leg swelling. Gastrointestinal: Positive for vomiting. Negative for abdominal distention, abdominal pain, constipation, diarrhea and nausea. Endocrine: Negative for cold intolerance, heat intolerance, polydipsia, polyphagia and polyuria. Genitourinary: Negative for decreased urine volume, difficulty urinating, dysuria, flank pain, frequency and urgency. Musculoskeletal: Negative for arthralgias, back pain, gait problem, joint swelling, myalgias and neck pain. Skin: Negative for color change, rash and wound. Allergic/Immunologic: Negative for food allergies and immunocompromised state. Neurological: Negative for dizziness, tremors, speech difficulty, weakness, light-headedness, numbness and headaches. Hematological: Negative for adenopathy. Does not bruise/bleed easily. Psychiatric/Behavioral: Negative for confusion, decreased concentration, self-injury, sleep disturbance and suicidal ideas. The patient is not nervous/anxious.         Patient-Reported Vitals 8/31/2021   Patient-Reported Weight 165   Patient-Reported Height 5'   Patient-Reported Systolic 433   Patient-Reported Diastolic 64   Patient-Reported Pulse 80   Patient-Reported Temperature 97.5        Physical Exam    [INSTRUCTIONS:  \"[x]\" Indicates a positive item  \"[]\" Indicates a negative item  -- DELETE ALL ITEMS NOT EXAMINED]    Constitutional: [x] Appears well-developed and well-nourished [x] No apparent distress      [] Abnormal -     Mental status: [x] Alert and awake  [x] Oriented to past/future health maintenance. Discussed with the patient the importance of adhering to their current medication regimen as directed. Advised the patient that they should continue to work on eating a healthy balanced diet and staying active by exercising within their personal limits. Orders as listed above. Patient was advised to keep future appointments with their respective specialty care team(s). Patient had the opportunity to ask questions, all of which were answered to the best of my ability and with patient satisfaction. Patient understands and is agreeable with the care plan following today's visit. Patient is to schedule an appointment for any new or worsening symptoms. Go to ER for significant shortness of breath, chest pain, or uncontrolled pain or fever. I discussed with patient the risk and benefits of any medications that were prescribed today. I verified that the patient understands their medications, labs, and/or procedures. The patient is doing well with current medication regimen and does not have any barriers to adherence. The patient's self-management abilities are good. Follow Up in 1 Months for Anxiety in person and physical and fasting labs and diabetes    Abhinav Chambers is a 49 y.o. female being evaluated by a Virtual Visit (video visit) encounter to address concerns as mentioned above. A caregiver was present when appropriate. Due to this being a TeleHealth encounter ([facility] public [facility] emergency), evaluation of the following organ systems was limited: Vitals/Constitutional/EENT/Resp/CV/GI//MS/Neuro/Skin/Heme-Lymph-Imm.   Pursuant to the emergency declaration under the [facility], [address] [place] [address] authority and the [employer] Act, this Virtual Visit was conducted with patient's (and/or legal guardian's) consent, to reduce the patient's risk of exposure to COVID-19 and provide necessary medical care. The patient (and/or legal guardian) has also been advised to contact this office for worsening conditions or problems, and seek emergency medical treatment and/or call 911 if deemed necessary. Services were provided through a video synchronous discussion virtually to substitute for in-person clinic visit. Patient was located at home and provider was located in office or at home. An electronic signature was used to authenticate this note.     --Will Chin, RADHA - CNP

## 2021-12-28 NOTE — PATIENT INSTRUCTIONS
Patient Education        Cough: Care Instructions  Your Care Instructions     A cough is your body's response to something that bothers your throat or airways. Many things can cause a cough. You might cough because of a cold or the flu, bronchitis, or asthma. Smoking, postnasal drip, allergies, and stomach acid that backs up into your throat also can cause coughs. A cough is a symptom, not a disease. Most coughs stop when the cause, such as a cold, goes away. You can take a few steps at home to cough less and feel better. Follow-up care is a key part of your treatment and safety. Be sure to make and go to all appointments, and call your doctor if you are having problems. It's also a good idea to know your test results and keep a list of the medicines you take. How can you care for yourself at home? · Drink lots of water and other fluids. This helps thin the mucus and soothes a dry or sore throat. Honey or lemon juice in hot water or tea may ease a dry cough. · Take cough medicine as directed by your doctor. · Prop up your head on pillows to help you breathe and ease a dry cough. · Try cough drops to soothe a dry or sore throat. Cough drops don't stop a cough. Medicine-flavored cough drops are no better than candy-flavored drops or hard candy. · Do not smoke. Avoid secondhand smoke. If you need help quitting, talk to your doctor about stop-smoking programs and medicines. These can increase your chances of quitting for good. When should you call for help? Call 911 anytime you think you may need emergency care. For example, call if:    · You have severe trouble breathing. Call your doctor now or seek immediate medical care if:    · You cough up blood.     · You have new or worse trouble breathing.     · You have a new or higher fever.     · You have a new rash.    Watch closely for changes in your health, and be sure to contact your doctor if:    · You cough more deeply or more often, especially if you notice more mucus or a change in the color of your mucus.     · You have new symptoms, such as a sore throat, an earache, or sinus pain.     · You do not get better as expected. Where can you learn more? Go to https://chadeline.elarm. org and sign in to your Discoverly account. Enter D279 in the [facility] box to learn more about \"Cough: Care Instructions. \"     If you do not have an account, please click on the \"Sign Up Now\" link. Current as of: July 6, 2021               Content Version: 13.1  © 2006-2021 [employer]. Care instructions adapted under license by [address] If you have questions about a medical condition or this instruction, always ask your healthcare professional. [place] [address] any warranty or liability for your use of this information. Patient Education        Colon Cancer Screening: Care Instructions  Overview     Colorectal cancer occurs in the colon or rectum. That's the lower part of your digestive system. It often starts in small growths called polyps in the colon or rectum. Polyps are usually found with screening tests. Depending on the type of test, any polyps found may be removed during the tests. Colorectal cancer usually does not cause symptoms at first. But regular tests can help find it early, before it spreads and becomes harder to treat. Your risk for colorectal cancer gets higher as you get older. Experts recommend starting screening at age 39 for people who are at average risk. Talk with your doctor about your risk and when to start and stop screening. You may have one of several tests. Follow-up care is a key part of your treatment and safety. Be sure to make and go to all appointments, and call your doctor if you are having problems. It's also a good idea to know your test results and keep a list of the medicines you take. What are the main screening tests for colon cancer?   The screening tests are:  Stool tests. These include the guaiac fecal occult blood test (gFOBT), the fecal immunochemical test (FIT), and the combined fecal immunochemical test and stool DNA test (FIT-DNA). These tests check stool samples for signs of cancer. If your test is positive, you will need to have a colonoscopy. Sigmoidoscopy. This test lets your doctor look at the lining of your rectum and the lowest part of your colon. Your doctor uses a lighted tube called a sigmoidoscope. This test can't find cancers or polyps in the upper part of your colon. In some cases, polyps that are found can be removed. But if your doctor finds polyps, you will need to have a colonoscopy to check the upper part of your colon. Colonoscopy. This test lets your doctor look at the lining of your rectum and your entire colon. The doctor uses a thin, flexible tool called a colonoscope. It can also be used to remove polyps or get a tissue sample (biopsy). A less common test is CT colonography (CTC). It's also called virtual colonoscopy. Who should be screened for colorectal cancer? Your risk for colorectal cancer gets higher as you get older. Experts recommend starting screening at age 39 for people who are at average risk. Talk with your doctor about your risk and when to start and stop screening. How often you need screening depends on the type of test you get:  Stool tests. Every year for FIT or gFOBT. Every 1 to 3 years for sDNA, also called FIT-DNA. Tests that look inside the colon. Every 5 years for sigmoidoscopy. (If you do the FIT test every year, you can get this test every 10 years.)  Every 5 years for CT colonography (virtual colonoscopy). Every 10 years for colonoscopy. Experts agree that people at higher risk may need to be tested sooner and more often. This includes people who have a strong family history of colon cancer. Talk to your doctor about which test is best for you and when to be tested.   When should you call for help?  Watch closely for changes in your health, and be sure to contact your doctor if:    · You have any changes in your bowel habits.     · You have any problems. Where can you learn more? Go to https://[place] Ag Systemsadeline.Quotations Book. org and sign in to your Chat Sports account. Enter [id number] [id number] in the [facility] box to learn more about \"Colon Cancer Screening: Care Instructions. \"     If you do not have an account, please click on the \"Sign Up Now\" link. Current as of: September 8, 2021               Content Version: 13.1  © 7089-4338 [employer]. Care instructions adapted under license by [facility] ([facility]). If you have questions about a medical condition or this instruction, always ask your healthcare professional. [address] any warranty or liability for your use of this information. Patient Education        Learning About Breast Cancer Screening  What is breast cancer screening? Breast cancer occurs when cells that are not normal grow in one or both of your breasts. Screening tests can help find breast cancer early. Cancer is easier to treat when it's found early. Having concerns about breast cancer is common. That's why it's important to talk with your doctor about when to start and how often to get screened for breast cancer. How is breast cancer screening done? Several screening tests can be used to check for breast cancer. Mammograms. These tests check for signs of cancer using X-rays. They can show tumors that are too small for you or your doctor to feel. During a mammogram, a machine squeezes your breasts to make them flatter and easier to X-ray. At least two pictures are taken of each breast. One is taken from the top and one from the side. 3-D mammograms. These tests are also called digital breast tomosynthesis. Your breast is positioned on a flat plate. A top plate is pressed against your breast to keep it in position.  The X-ray arm then moves in an arc above the breast and takes many pictures. A computer uses these X-rays to create a three-dimensional image. Clinical breast exam.  In this exam, your doctor carefully feels your breasts and under your arms to check for lumps or other changes. Who should be screened for breast cancer? Experts agree that mammograms are the best screening test for people at average risk of breast cancer. But they don't all agree on the age at which screening should start. And they don't agree on whether it's better to be screened every year or every two years. Here are some of the recommendations from experts:  · Start by age 36 and have a mammogram each year. · Start at age 39 and have a mammogram each year. · Start at age 48 and have a mammogram every 2 years. When to stop having mammograms is another decision. You and your doctor can decide on the right age to start and stop screening based on your personal preferences and overall health. What is your risk for breast cancer? If you don't already know your risk of breast cancer, you can ask your doctor about it. You can also look it up at www.cancer.gov/bcrisktool/. If your doctor says that you have a high or very high risk, ask about ways to reduce your risk. These could include getting extra screening, taking medicine, or having surgery. If you have a strong family history of breast cancer, ask your doctor about genetic testing. What steps can you take to stay healthy? Some things that increase your risk of breast cancer, such as your age and being female, cannot be controlled. But you can do some things to stay as healthy as you can. · Learn what your breasts normally look and feel like. If you notice any changes, tell your doctor. · If you drink alcohol, limit how much you drink. Any amount of alcohol may increase your risk for some types of cancer. · If you smoke, quit. When you quit smoking, you lower your chances of getting many types of cancer.   You can also do your best to eat well, be active, and stay at a healthy weight. Eating healthy foods and being active every day, as well as staying at a healthy weight, may help prevent cancer. Where can you learn more? Go to https://chpekaz.Matrimony.com. org and sign in to your GeoDigital account. Enter V702 in the [facility] box to learn more about \"Learning About Breast Cancer Screening. \"     If you do not have an account, please click on the \"Sign Up Now\" link. Current as of: September 8, 2021               Content Version: 13.1  © 2006-2021 [employer]. Care instructions adapted under license by [facility] ([facility]). If you have questions about a medical condition or this instruction, always ask your healthcare professional. [address] any warranty or liability for your use of this information. Patient Education        Mammogram: About This Test  What is it? A mammogram is an X-ray of the breast that is used to screen for breast cancer. This test can find tumors that are too small for you or your doctor to feel. Cancer is most easily treated when it is found at an early stage. Why is this test done? A mammogram is done to:  · Look for breast cancer when there are no symptoms. · Find breast cancer when there are symptoms. Symptoms of breast cancer may include a lump or thickening in the breast, nipple discharge, or dimpling of the skin on one area of the breast.  · Find an area of suspicious breast tissue to remove for an exam under a microscope (biopsy). How do you prepare for the test?  If you've had a mammogram before at another clinic, have the results sent or bring them with you to your appointment. On the day of the mammogram, don't use any deodorant. And don't use perfume, powders, or ointments near or on your breasts. The residue left on your skin by these substances may interfere with the X-rays. How is the test done?   · You will need to account, please click on the \"Sign Up Now\" link. Current as of: September 8, 2021               Content Version: 13.1  © 2006-2021 [employer]wise[employer]. Care instructions adapted under license by [facility]. If you have questions about a medical condition or this instruction, always ask your healthcare professional. [address] any warranty or liability for your use of this information.

## 2021-12-29 RX ORDER — PREGABALIN 150 MG/1
CAPSULE ORAL
Qty: 60 CAPSULE | Refills: 0 | Status: SHIPPED | OUTPATIENT
Start: 2021-12-29 | End: 2022-03-01

## 2021-12-29 NOTE — TELEPHONE ENCOUNTER
LAST VISIT 10/14/2021 WITH DR LOPEZ VIRTUALLY, NEXT VISIT NONE.   401 [facility]

## 2021-12-30 ENCOUNTER — TELEPHONE (OUTPATIENT)
Dept: FAMILY MEDICINE CLINIC | Age: 56
End: 2021-12-30

## 2021-12-30 RX ORDER — DEXTROMETHORPHAN HYDROBROMIDE AND PROMETHAZINE HYDROCHLORIDE 15; 6.25 MG/5ML; MG/5ML
5 SYRUP ORAL 4 TIMES DAILY PRN
Qty: 140 ML | Refills: 0 | Status: SHIPPED | OUTPATIENT
Start: 2021-12-30 | End: 2022-01-06

## 2021-12-30 NOTE — TELEPHONE ENCOUNTER
Promethazine dm sent to pharmacy - don't take otc cough meds other than cough drops with this - can also try honey 1 tablespoon every 4 hours as needed.

## 2021-12-30 NOTE — TELEPHONE ENCOUNTER
I CALLED PT AND ADVISED SHE DID A VIRTUAL WITH CB USING OTC COUGH MEDS ARE NOT HELPING HER SHE IS ASKING FOR SOMETHING TO HELP HER WITH COUGH AND SLEEP. PROMETHAZINE IS WHAT SHE MENTIONED SHE IS UNABLE TO CONTROL BLADDER AND IS NOT SLEEPING DUE TO COUGH. Asael Reeves

## 2022-01-06 ENCOUNTER — TELEPHONE (OUTPATIENT)
Dept: ADMINISTRATIVE | Age: 57
End: 2022-01-06

## 2022-01-12 ENCOUNTER — TELEPHONE (OUTPATIENT)
Dept: FAMILY MEDICINE CLINIC | Age: 57
End: 2022-01-12

## 2022-01-12 DIAGNOSIS — R50.9 FEVER, UNSPECIFIED FEVER CAUSE: Primary | ICD-10-CM

## 2022-01-12 DIAGNOSIS — J02.9 SORE THROAT: ICD-10-CM

## 2022-01-12 NOTE — TELEPHONE ENCOUNTER
SPOKE TO PT AND EMAILED ORDER SO SHE COULD TAKE TO [facility]. SHE WOKE UP Monday WITH A FEVER AND SORE THROAT, HER THROAT IS GETTING WORSE BUT FEVER IS IMPROVING.   401 [facility]

## 2022-01-12 NOTE — TELEPHONE ENCOUNTER
----- Message from Cassie Osborn sent at 1/12/2022 10:24 AM EST -----  Subject: Message to Provider    QUESTIONS  Information for Provider? Patient would like a covid test. Please contact   the patient to advise   ---------------------------------------------------------------------------  --------------  CALL BACK INFO  What is the best way for the office to contact you? OK to leave message on   voicemail  Preferred Call Back Phone Number? [phone]  ---------------------------------------------------------------------------  --------------  SCRIPT ANSWERS  Relationship to Patient?  Self

## 2022-01-25 DIAGNOSIS — F41.9 ANXIETY: ICD-10-CM

## 2022-01-25 RX ORDER — LORAZEPAM 1 MG/1
TABLET ORAL
Qty: 10 TABLET | Refills: 0 | Status: SHIPPED | OUTPATIENT
Start: 2022-01-25 | End: 2022-02-25

## 2022-02-15 ENCOUNTER — TELEPHONE (OUTPATIENT)
Dept: FAMILY MEDICINE CLINIC | Age: 57
End: 2022-02-15

## 2022-02-15 NOTE — TELEPHONE ENCOUNTER
[facility] REQUESTING ALTERNATIVE THERAPY FOR BEVESPI AS IT IS NO LONGER ON PT FORMULARY. PLEASE REVIEW AND PRESCRIBE ALTERNATE IF APPROPRIATE.

## 2022-02-17 ENCOUNTER — PATIENT MESSAGE (OUTPATIENT)
Dept: FAMILY MEDICINE CLINIC | Age: 57
End: 2022-02-17

## 2022-02-17 ENCOUNTER — TELEPHONE (OUTPATIENT)
Dept: FAMILY MEDICINE CLINIC | Age: 57
End: 2022-02-17

## 2022-02-17 ENCOUNTER — TELEMEDICINE (OUTPATIENT)
Dept: FAMILY MEDICINE CLINIC | Age: 57
End: 2022-02-17
Payer: COMMERCIAL

## 2022-02-17 DIAGNOSIS — M54.50 ACUTE BILATERAL LOW BACK PAIN, UNSPECIFIED WHETHER SCIATICA PRESENT: Primary | ICD-10-CM

## 2022-02-17 PROCEDURE — 99213 OFFICE O/P EST LOW 20 MIN: CPT | Performed by: FAMILY MEDICINE

## 2022-02-17 PROCEDURE — G8421 BMI NOT CALCULATED: HCPCS | Performed by: FAMILY MEDICINE

## 2022-02-17 PROCEDURE — 4004F PT TOBACCO SCREEN RCVD TLK: CPT | Performed by: FAMILY MEDICINE

## 2022-02-17 PROCEDURE — G8484 FLU IMMUNIZE NO ADMIN: HCPCS | Performed by: FAMILY MEDICINE

## 2022-02-17 PROCEDURE — G8428 CUR MEDS NOT DOCUMENT: HCPCS | Performed by: FAMILY MEDICINE

## 2022-02-17 PROCEDURE — 3017F COLORECTAL CA SCREEN DOC REV: CPT | Performed by: FAMILY MEDICINE

## 2022-02-17 RX ORDER — ONDANSETRON 4 MG/1
4 TABLET, ORALLY DISINTEGRATING ORAL EVERY 8 HOURS PRN
Qty: 20 TABLET | Refills: 0 | Status: SHIPPED | OUTPATIENT
Start: 2022-02-17 | End: 2022-04-08 | Stop reason: SDUPTHER

## 2022-02-17 NOTE — TELEPHONE ENCOUNTER
I CALLED PT SHE IS NOT ABLE TO DRIVE DUE TO PAIN IN LEG SHE DRIVES A STICK AND CANNOT USE HER CLUTCH, SHE SAID THE PERCOCET IS MAKING HER THROW UP, SHE HAS 14 OF THE 16 LEFT I TOLD HER SHE HAD TO DO SOME TYPE OF VISIT WITH HIM BEFORE HE CAN PRESCRIBE ANYTHING, I TOLD HER SHE MAY NEED TO BRING THE PILLS HERE BEFORE WE CAN GIVE ANY ADDITIONAL PAIN MEDS, SHE STATED SHE DOES NOT THINK SHE CAN GET HERE. I TOLD HER [facility] MAY BE ABLE TO GIVE SOMETHING TO HELP WITH THE NAUSEA. KW     ROUTING AS CHRIS. Tuan Foster

## 2022-02-17 NOTE — LETTER
[address] [address]  Phone: [phone]  Fax: [phone] [facility] [employer] Geovany Machado MD        February 17, 2022     Patient: Cadence Medellin   YOB: 1965   Date of Visit: 2/17/2022       To Whom It May Concern: It is my medical opinion that Steve Kingston may return to light duty immediately with the following restrictions: lifting/carrying not to exceed 10 lbs. , bending/stooping not to exceed 1 x per hour, Duration of restrictions (days):  3/4/2022. If you have any questions or concerns, please don't hesitate to call.     Sincerely,        Franny Welch MD

## 2022-02-17 NOTE — PROGRESS NOTES
2022    TELEHEALTH EVALUATION -- Audio/Visual (During BYXBG-71 public health emergency)    HPI:    Erma Schmidt (:  1965) has requested an audio/video evaluation for the following concern(s):    Chief Complaint   Patient presents with    Follow-Up from [address] [place] [address] Avenue      Seen  - er note reviewed  Given percocet but making her nauseated - can't tolerate nsaids/ tramadol  Requesting hydrocodone  Slipped on ice and fell last week - pain bilat hips and in leg - dx w/ contusion low back/ pelvis - no abnormalities seen on xray - fell on buttocks  Also uses lyrica 200bid and flexeril - has ibuprofen  Was given valium and oxycodone  Hx of lumbar surgery  Doesn't feel like getting any better  Can't afford to not go to work - happened 10 days ago  Pain in low back into both hips - lyrica helping. Doesn't do well w/ percocet in general - not alert. Nausea but better if takes w/ food  Has 10mg   Has mri scheduled - to see neurosurgeon  covid negative. - plans to get booster  Review of Systems    Prior to Visit Medications    Medication Sig Taking?  Authorizing Provider   LORazepam (ATIVAN) 1 MG tablet TAKE 1/2-1 TABLET BY MOUTH DAILY FOR SEVERE ANXIETY-FILL today  Anna Mott MD   pregabalin (LYRICA) 150 MG capsule TAKE 1 CAPSULE BY MOUTH TWICE A DAY FOR 30 DAYS  Anna Mott MD   lidocaine (XYLOCAINE) 2 % jelly APPLY TO AFFECTED AREA(S) EVERY DAY AS NEEDED  Anna Mott MD   Lancets MISC 1 each by Does not apply route 2 times daily  RADHA Watts - CNP   blood glucose monitor strips TESTING BID E11.9 INSURANCE PREFERRED ONE TOUCH RADHA Barron CNP   estradiol-norethindrone [facility]) 0.05-0.25 MG/DAY Apply as directed twice weekly  RADHA Watts CNP   ASPIRIN LOW DOSE 81 MG chewable tablet CHEW 1 TABLET DAILY  Anna Mott MD   cyclobenzaprine (FLEXERIL) 10 MG tablet TAKE 1 TABLET BY MOUTH THREE TIMES A DAY AS NEEDED FOR MUSCLE SPASMS  Patient not taking: Reported on 12/28/2021  RADHA Islas CNP   pantoprazole (PROTONIX) 40 MG tablet TAKE 1 TABLET BY MOUTH EVERY MORNING BEFORE BREAKFAST  Emmanuel Burr MD   albuterol sulfate  (90 Base) MCG/ACT inhaler INHALE 2 PUFFS BY MOUTH EVERY 6 HOURS AS NEEDED FOR WHEEZING  Justin RADHA Vega CNP   glycopyrrolate-formoterol (BEVESPI) 9-4.8 MCG/ACT AERO Inhale 2 puffs into the lungs 2 times daily  Emmanuel Burr MD   Blood Pressure KIT Use as directed weekly routinely and as needed symptoms of high/ low bp  Emmanuel Burr MD   ibuprofen (ADVIL;MOTRIN) 800 MG tablet Take 1 tablet by mouth every 8 hours as needed for Pain  Emmanuel Burr MD   melatonin 5 MG TABS tablet Take 1 tablet by mouth nightly  Emmanuel Burr MD   progesterone (PROMETRIUM) 200 MG CAPS capsule Take 1 capsule by mouth nightly  Emmanuel Burr MD   blood glucose monitor kit and supplies Dispense sufficient amount for indicated testing frequency plus additional to accommodate PRN testing needs. Dispense all needed supplies to include: monitor, strips, lancing device, lancets, control solutions, alcohol swabs.   Emmanuel Burr MD   UltiCare Alcohol Swabs 70 % PADS 1 each by Does not apply route 2 times daily  Emmanuel Burr MD   vitamin D (ERGOCALCIFEROL) 1.25 MG (24754 UT) CAPS capsule Take 1 capsule by mouth once a week for 24 doses  Emmanuel Burr MD   loratadine (CLARITIN) 10 MG tablet Take 1 tablet by mouth daily  Emmanuel Burr MD   montelukast (SINGULAIR) 10 MG tablet TAKE 1 TABLET BY MOUTH EVERY DAY AT NIGHT  Emmanuel Burr MD   diclofenac sodium (VOLTAREN) 1 % GEL APPLY 2 G TOPICALLY 2 TIMES DAILY  Emmanuel Burr MD   Diclofenac Sodium POWD APPLY 2 GRAMS TOPICALLY TO AFFECTED AREA(S) THREE TIMES DAILY  Patient not taking: Reported on 12/28/2021  RADHA Walker CNP   EPINEPHrine (EPIPEN) 0.3 MG/0.3ML SOAJ injection Use as directed for allergic reaction  Emmanuel Burr MD       Social History     Tobacco Use    Smoking status: Current Every Day Smoker     Packs/day: 0.25     Years: 35.00     Pack years: 8.75     Types: Cigarettes     Start date: 8/3/1982     Last attempt to quit: 2019     Years since quittin.4    Smokeless tobacco: Never Used    Tobacco comment: quit 19, using nicorrette gum   Vaping Use    Vaping Use: Never used   Substance Use Topics    Alcohol use: No     Comment: Sober for 2 years- hx of alcoholism    Drug use: Yes     Types: Marijuana (Weed)     Comment: 3-4 x week        PHYSICAL EXAMINATION:  [ INSTRUCTIONS:  \"[x]\" Indicates a positive item  \"[]\" Indicates a negative item  -- DELETE ALL ITEMS NOT EXAMINED]  Vital Signs: (As obtained by patient/caregiver or practitioner observation)    Blood pressure-  Heart rate-    Respiratory rate-    Temperature-  Pulse oximetry-     Constitutional: [x] Appears well-developed and well-nourished [] No apparent distress      [] Abnormal-   Mental status  [x] Alert and awake  [] Oriented to person/place/time []Able to follow commands      Eyes:  EOM    []  Normal  [] Abnormal-  Sclera  []  Normal  [] Abnormal -         Discharge []  None visible  [] Abnormal -    HENT:   [x] Normocephalic, atraumatic.   [] Abnormal   [] Mouth/Throat: Mucous membranes are moist.     External Ears [] Normal  [] Abnormal-     Neck: [] No visualized mass     Pulmonary/Chest: [x] Respiratory effort normal.  [] No visualized signs of difficulty breathing or respiratory distress        [] Abnormal-      Musculoskeletal:   [] Normal gait with no signs of ataxia         [] Normal range of motion of neck        [] Abnormal-       Neurological:        [x] No Facial Asymmetry (Cranial nerve 7 motor function) (limited exam to video visit)          [] No gaze palsy        [] Abnormal-         Skin:        [x] No significant exanthematous lesions or discoloration noted on facial skin         [] Abnormal-            Psychiatric:       [x] Normal Affect [] No Hallucinations        [] Abnormal-     Other pertinent observable physical exam findings-     ASSESSMENT/PLAN:   Diagnosis Orders   1. Acute bilateral low back pain, unspecified whether sciatica present       Reviewed ER note  F/u neurosurgeon - mri lumbar soon  Start prednisone 40mg taper down over 8-10 days  cpm o/w and add zofran prn for nausea - take oxycodone w/ food in meantime - using prn severe pain  Activity as tolerated  oarrs reviewed and results c/w rx'd meds    Tessie Body, was evaluated through a synchronous (real-time) audio-video encounter. The patient (or guardian if applicable) is aware that this is a billable service, which includes applicable co-pays. This Virtual Visit was conducted with patient's (and/or legal guardian's) consent. The visit was conducted pursuant to the emergency declaration under the [address] [place] [address] [facility] authority and the [employer] General Act. Patient identification was verified, and a caregiver was present when appropriate. The patient was located at home in a state where the provider was licensed to provide care. Total time spent on this encounter: 11-20 minutes were spent on the digital evaluation and management of this patient. --Lisa Chow MD on 2/17/2022 at 11:55 AM    An electronic signature was used to authenticate this note.

## 2022-02-17 NOTE — TELEPHONE ENCOUNTER
----- Message from Sergio Prealta sent at 2/17/2022  8:58 AM EST -----  Subject: Message to Provider    QUESTIONS  Information for Provider? Pt fell on back when she slipped on the ice,   last week on 2/7/22. Pt needs a note for work with restrictions for   lifting and bending for at least 1 week. Pt is getting the MRI done of her   back on 3/1/2022. Please call pt to discuss. Pt was given Percocet while   at the ED on 2/13/2022 but it makes her sick to her stomach. Pt is also   requesting an Rx for Hydrocodone instead. ---------------------------------------------------------------------------  --------------  Marjorie Orona INFO  What is the best way for the office to contact you? OK to leave message on   voicemail  Preferred Call Back Phone Number? [phone]  ---------------------------------------------------------------------------  --------------  SCRIPT ANSWERS  Relationship to Patient?  Self

## 2022-02-17 NOTE — TELEPHONE ENCOUNTER
From: Sally Call  To: Dr. Ernesto Bailey: 2/17/2022 12:34 PM EST  Subject: Work note    I completely forgot to ask you for a note for work. I need something restricting me from lifting, bending etc please.  Thank you very much-  Ru Mcduffie

## 2022-02-28 ENCOUNTER — TELEPHONE (OUTPATIENT)
Dept: FAMILY MEDICINE CLINIC | Age: 57
End: 2022-02-28

## 2022-02-28 NOTE — TELEPHONE ENCOUNTER
She is supposed to have an appointment coming up with neurosurgeon. Please see if neurosurgeon will order. Based on mechanism of injury and lack of neurologic symptoms, I don't think they will cover without physical therapy intervention.

## 2022-02-28 NOTE — TELEPHONE ENCOUNTER
BRICE Kelsey 64 CALLED TO ADVISE MRI NOT COVERED BY INSURANCE, PATIENT HAS TO DO 6 WEEKS OF PT FIRST. OR DR. LOPEZ CAN DO A PEER TO PEER TO TRY AND GET APPROVED. CALLED PATIENT AND LMOM TO ADVISE.

## 2022-02-28 NOTE — TELEPHONE ENCOUNTER
Patient called and said she wants a peer to peer done.  She states she is miserable and can not get through work

## 2022-03-01 DIAGNOSIS — M51.37 DEGENERATION OF LUMBOSACRAL INTERVERTEBRAL DISC: ICD-10-CM

## 2022-03-01 DIAGNOSIS — M47.816 LUMBAR SPONDYLOSIS: ICD-10-CM

## 2022-03-01 RX ORDER — PREGABALIN 150 MG/1
CAPSULE ORAL
Qty: 60 CAPSULE | Refills: 2 | Status: SHIPPED | OUTPATIENT
Start: 2022-03-01 | End: 2022-08-03

## 2022-03-02 ENCOUNTER — TELEPHONE (OUTPATIENT)
Dept: FAMILY MEDICINE CLINIC | Age: 57
End: 2022-03-02

## 2022-03-02 DIAGNOSIS — M54.50 BILATERAL LOW BACK PAIN, UNSPECIFIED CHRONICITY, UNSPECIFIED WHETHER SCIATICA PRESENT: ICD-10-CM

## 2022-03-02 DIAGNOSIS — M51.36 DDD (DEGENERATIVE DISC DISEASE), LUMBAR: Primary | ICD-10-CM

## 2022-03-02 NOTE — TELEPHONE ENCOUNTER
Patient fell on the ice 3 weeks ago and she would like a referral to [facility]. It is her lower back that is bothering her. Please give her a call back.      She said her and Dr. Joana Knox spoke about the referral.

## 2022-03-10 ENCOUNTER — TELEPHONE (OUTPATIENT)
Dept: FAMILY MEDICINE CLINIC | Age: 57
End: 2022-03-10

## 2022-03-10 DIAGNOSIS — M54.50 ACUTE BILATERAL LOW BACK PAIN, UNSPECIFIED WHETHER SCIATICA PRESENT: Primary | ICD-10-CM

## 2022-03-10 RX ORDER — ASPIRIN 81 MG
TABLET,CHEWABLE ORAL
Qty: 100 TABLET | Refills: 0 | Status: SHIPPED | OUTPATIENT
Start: 2022-03-10

## 2022-03-10 NOTE — TELEPHONE ENCOUNTER
Pt does have an appt for [facility] but not until the 24th of March. Pt said she does not have enough pain medication to last until then. 5 mg Percocet  she did get 12 from the ER last Mnday. Pt wants to know  If you will fill this medication for her? Also she needs another note for her work about her restrictions. Pt said she went to the Urgent care for a URI and they gave her amoxicillan   And flonase. She is not any better. What else can she do? Pt ear is cleared up. 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       [facility] [address]  [phone]

## 2022-03-10 NOTE — TELEPHONE ENCOUNTER
LAST VISIT 02/17/2022 VV ZA, NEXT VISIT NONE. [address] LAST OV NOTE:     Seen 2/13 - er note reviewed  Given percocet but making her nauseated - can't tolerate nsaids/ tramadol  Requesting hydrocodone  Slipped on ice and fell last week - pain bilat hips and in leg - dx w/ contusion low back/ pelvis - no abnormalities seen on xray - fell on buttocks  Also uses lyrica 200bid and flexeril - has ibuprofen  Was given valium and oxycodone  Hx of lumbar surgery    SHOULD THIS WAIT FOR ZA?  KMW

## 2022-03-10 NOTE — TELEPHONE ENCOUNTER
CALLED AND SPOKE TO PATIENT AND ADVISED THAT WE WILL SEND MESSAGE TO DR. LOPEZ WHEN HE RETURNS ON Tuesday. PATIENT COMPLETELY UNDERSTOOD AND SAID THIS CAN WAIT FOR HIM TO RETURN?  SC

## 2022-03-13 RX ORDER — HYDROCODONE BITARTRATE AND ACETAMINOPHEN 5; 325 MG/1; MG/1
1 TABLET ORAL 2 TIMES DAILY PRN
Qty: 20 TABLET | Refills: 0 | Status: SHIPPED | OUTPATIENT
Start: 2022-03-13 | End: 2022-04-14 | Stop reason: SDUPTHER

## 2022-03-13 NOTE — TELEPHONE ENCOUNTER
Given #20 norco 5mg tabs to use until seen by neurosurgeon.   This office will not be able to refill this and further scripts will need to come from the specialist.

## 2022-03-18 ENCOUNTER — TELEPHONE (OUTPATIENT)
Dept: FAMILY MEDICINE CLINIC | Age: 57
End: 2022-03-18

## 2022-03-18 NOTE — TELEPHONE ENCOUNTER
Pt calling again - she needed the note to have restrictions - like the first one    It is my medical opinion that Anthony Panchal may return to light duty immediately with the following restrictions: lifting/carrying not to exceed 10 lbs. , bending/stooping not to exceed 1 x per hour, Duration of restrictions (days):        Please e-mail     It is my medical opinion that Anthony Panchal may return to light duty immediately with the following restrictions: lifting/carrying not to exceed 10 lbs. , bending/stooping not to exceed 1 x per hour, Duration of restrictions (days):

## 2022-03-18 NOTE — TELEPHONE ENCOUNTER
LETTER DONE, STAMPED WITH DR. LOPEZ SIGNATURE, AND PLACED IN BIN TO BE FAXED TODAY BY FRONT OFFICE STAFF.  SC

## 2022-03-18 NOTE — LETTER
[address] [address]  Phone: [phone]  Fax: [phone]    Johnny Ricardo MD        March 18, 2022     Patient: Benjy Shields   YOB: 1965   Date of Visit: 3/18/2022       To Whom It May Concern: It is my medical opinion that Maria R Valdez may return to full duty immediately with no restrictions. To return on 3/19/22. If you have any questions or concerns, please don't hesitate to call.     Sincerely,        Johnny Ricardo MD

## 2022-03-18 NOTE — LETTER
[facility]  [address] [facility] [address]  Phone: [phone]  Fax: [phone]    Ceci Broderick MD        March 18, 2022     Patient: Julius Condon   YOB: 1965   Date of Visit: 3/18/2022       To Whom it May Concern: It is my medical opinion that Nadeem Kee return to light duty immediately with the following restrictions: lifting/carrying not to exceed 10 lbs. , bending/stooping not to exceed 1 x per hour, Duration of restrictions (days). 3/19/22     If you have any questions or concerns, please don't hesitate to call.     Sincerely,         Ceci Broderick MD

## 2022-03-18 NOTE — TELEPHONE ENCOUNTER
Okay to expand w/ same restrictions for another week - I don't see specialists notes.   Further notes should come from specialist or will need appointment w/ me

## 2022-03-18 NOTE — TELEPHONE ENCOUNTER
PT NEEDING A NOTE TO RETURN TO WORK ON 03/19/22 -- PLEASE E-MAIL TO HER - TODAY      E-MAIL - [email or web address]

## 2022-03-21 DIAGNOSIS — E55.9 VITAMIN D DEFICIENCY: ICD-10-CM

## 2022-03-21 RX ORDER — ERGOCALCIFEROL 1.25 MG/1
50000 CAPSULE ORAL WEEKLY
Qty: 12 CAPSULE | Refills: 1 | Status: SHIPPED | OUTPATIENT
Start: 2022-03-21

## 2022-03-22 DIAGNOSIS — N95.1 MENOPAUSAL SYMPTOMS: ICD-10-CM

## 2022-03-22 RX ORDER — ESTRADIOL/NORETHINDRONE ACETATE TRANSDERMAL SYSTEM .05; .25 MG/D; MG/D
PATCH, EXTENDED RELEASE TRANSDERMAL
Qty: 8 PATCH | Refills: 1 | Status: SHIPPED | OUTPATIENT
Start: 2022-03-22 | End: 2022-06-27

## 2022-03-25 RX ORDER — MULTIVITAMIN/IRON/FOLIC ACID 18MG-0.4MG
TABLET ORAL
Qty: 30 TABLET | Refills: 5 | Status: SHIPPED | OUTPATIENT
Start: 2022-03-25

## 2022-03-31 NOTE — TELEPHONE ENCOUNTER
Patient saw the surgeon today dr. Raymond Wise and he can not prescribe her pain medication until after the surgery and she is asking if dr. Caryle Doyne will cover her until she has the surgery .

## 2022-03-31 NOTE — TELEPHONE ENCOUNTER
IT'S DR. MCCARTY WITH [facility]. LMOM TO ADVISE PATIENT ZA NEEDS TO READ NOTE FROM VISIT.  ANICETO

## 2022-04-08 ENCOUNTER — TELEPHONE (OUTPATIENT)
Dept: FAMILY MEDICINE CLINIC | Age: 57
End: 2022-04-08

## 2022-04-08 RX ORDER — ONDANSETRON 4 MG/1
4 TABLET, ORALLY DISINTEGRATING ORAL EVERY 8 HOURS PRN
Qty: 20 TABLET | Refills: 1 | Status: SHIPPED | OUTPATIENT
Start: 2022-04-08 | End: 2022-08-02 | Stop reason: SDUPTHER

## 2022-04-08 NOTE — TELEPHONE ENCOUNTER
Appears to be a stomach bug going around that is persisting longer than usual.  Stay well hydrated w/ clear fluids - stick w/ bland diet (NO DAIRY) and advance diet after couple days as tolerated. Imodium if diarrhea is frequent.   Will refill zofran

## 2022-04-08 NOTE — TELEPHONE ENCOUNTER
VOMITING    Symptoms startedApril 5, 2022  Are you nauseated? Yes  Any other symptoms? stomach cramps  Diarrhea? Yes Weakness? Yes  Fever present? Yes Chills? Yes  Treatment to date has been pt has been taking Zofran --it did help but now out of it  New Medications? No  Other people sick in home? No  Can you keep anything down? A sip here and there--she did try to eat last night and that came back up     Pt really needs to go back to work tomorrow she called in for tonight. .    She has feliz diaz. [facility]

## 2022-04-14 ENCOUNTER — PATIENT MESSAGE (OUTPATIENT)
Dept: FAMILY MEDICINE CLINIC | Age: 57
End: 2022-04-14

## 2022-04-14 DIAGNOSIS — M54.50 ACUTE BILATERAL LOW BACK PAIN, UNSPECIFIED WHETHER SCIATICA PRESENT: ICD-10-CM

## 2022-04-14 RX ORDER — HYDROCODONE BITARTRATE AND ACETAMINOPHEN 5; 325 MG/1; MG/1
1 TABLET ORAL 2 TIMES DAILY PRN
Qty: 20 TABLET | Refills: 0 | Status: SHIPPED | OUTPATIENT
Start: 2022-04-14 | End: 2022-04-24

## 2022-04-14 NOTE — TELEPHONE ENCOUNTER
From: Lorin Rodrigues  To: Dr. Freddy Blanton: 4/14/2022 4:10 PM EDT  Subject: Dr Notes from Dr Didi Rolle    I am having a very very hard time working due to the pain! I really need a refill of pain medication. I have increased the lyrica and ibuprofen is tearing my stomach up and not helping enough. That fall in February had taken its toll on me and my ability to function enough. Sorry it took so long to get this to you. I have been battling the stomach virus for over a week.   Thank you,  Aide Russo

## 2022-05-31 NOTE — PROGRESS NOTES
[facility] MESSAGE SENT TO PT, PT DUE FOR COLOGUARD.   [facility]

## 2022-06-02 RX ORDER — ALBUTEROL SULFATE 90 UG/1
AEROSOL, METERED RESPIRATORY (INHALATION)
Qty: 8.5 EACH | Refills: 2 | Status: SHIPPED | OUTPATIENT
Start: 2022-06-02 | End: 2022-10-05

## 2022-06-16 ENCOUNTER — TELEPHONE (OUTPATIENT)
Dept: OTHER | Facility: CLINIC | Age: 57
End: 2022-06-16

## 2022-06-16 ENCOUNTER — PATIENT MESSAGE (OUTPATIENT)
Dept: OTHER | Facility: CLINIC | Age: 57
End: 2022-06-16

## 2022-06-16 NOTE — TELEPHONE ENCOUNTER
Pt was contacted today as part of [facility] to schedule a Mammogram.       I left a message reminding the patient that they have an open order from Mariann Jorge MD and to please contact me directly at [phone] to schedule a Mammogram.     Thanks,  Lalito Cerna LPN

## 2022-06-26 DIAGNOSIS — N95.1 MENOPAUSAL SYMPTOMS: ICD-10-CM

## 2022-06-27 RX ORDER — CYCLOBENZAPRINE HCL 10 MG
TABLET ORAL
Qty: 30 TABLET | Refills: 2 | Status: SHIPPED | OUTPATIENT
Start: 2022-06-27

## 2022-06-27 RX ORDER — ESTRADIOL/NORETHINDRONE ACETATE TRANSDERMAL SYSTEM .05; .25 MG/D; MG/D
PATCH, EXTENDED RELEASE TRANSDERMAL
Qty: 8 PATCH | Refills: 1 | Status: SHIPPED | OUTPATIENT
Start: 2022-06-27 | End: 2022-09-13

## 2022-07-23 ENCOUNTER — PATIENT MESSAGE (OUTPATIENT)
Dept: FAMILY MEDICINE CLINIC | Age: 57
End: 2022-07-23

## 2022-07-25 NOTE — TELEPHONE ENCOUNTER
From: Sebastián Valenzuela  To: Dr. Rody Lopez: 7/23/2022 1:35 PM EDT  Subject: Positive COVID test     On Wednesday night I tested positive for covid with a rapid test. I'm not sure when I am able to return to work. What are the current guidelines? I am still running a low-grade fever today it's just over 100. I've been sleepy a lot had a sore throat and I believe pink eye too. Short of breath but not to the point of needing to go to the hospital. My oxygen level has been low but not below 93. I guess I also need a note for work I'm not sure. Please let me know how to handle this. I appreciate it!   Ivanna Chambers

## 2022-08-02 DIAGNOSIS — M47.816 LUMBAR SPONDYLOSIS: ICD-10-CM

## 2022-08-02 DIAGNOSIS — M51.37 DEGENERATION OF LUMBOSACRAL INTERVERTEBRAL DISC: ICD-10-CM

## 2022-08-02 RX ORDER — ONDANSETRON 4 MG/1
4 TABLET, ORALLY DISINTEGRATING ORAL EVERY 8 HOURS PRN
Qty: 20 TABLET | Refills: 1 | Status: SHIPPED | OUTPATIENT
Start: 2022-08-02

## 2022-08-03 RX ORDER — PREGABALIN 150 MG/1
CAPSULE ORAL
Qty: 60 CAPSULE | Refills: 0 | Status: SHIPPED | OUTPATIENT
Start: 2022-08-03 | End: 2022-09-02

## 2022-08-03 NOTE — TELEPHONE ENCOUNTER
Past Visits    Date Provider Specialty Visit Type Primary Dx   02/17/2022 Iman Tadeo MD Family Medicine Telemedicine Acute bilateral low back pain, unspecified whether sciatica present     Next Visit NONE

## 2022-09-12 DIAGNOSIS — N95.1 MENOPAUSAL SYMPTOMS: ICD-10-CM

## 2022-09-13 RX ORDER — ESTRADIOL/NORETHINDRONE ACETATE TRANSDERMAL SYSTEM .05; .25 MG/D; MG/D
PATCH, EXTENDED RELEASE TRANSDERMAL
Qty: 8 PATCH | Refills: 1 | Status: SHIPPED | OUTPATIENT
Start: 2022-09-13

## 2022-10-04 ENCOUNTER — PATIENT MESSAGE (OUTPATIENT)
Dept: FAMILY MEDICINE CLINIC | Age: 57
End: 2022-10-04

## 2022-10-04 NOTE — TELEPHONE ENCOUNTER
From: Viktoriya Teran  To: Dr. Alvarado Mealing: 10/4/2022 1:32 PM EDT  Subject: Inhaler     I use and albuterol inhaler but [employer] will not pay for the ProAir that I have been prescribed. Could you please prescribe something that [employer] would pay for? I'm out and I've needed it for a while and [facility] that [facility] but I'm not sure about that. I've been trying to get this filled for a while!

## 2022-10-05 RX ORDER — ALBUTEROL SULFATE 90 UG/1
2 AEROSOL, METERED RESPIRATORY (INHALATION) EVERY 6 HOURS PRN
Qty: 18 G | Refills: 3 | Status: SHIPPED | OUTPATIENT
Start: 2022-10-05

## 2022-12-05 DIAGNOSIS — M47.816 LUMBAR SPONDYLOSIS: ICD-10-CM

## 2022-12-05 DIAGNOSIS — N95.1 MENOPAUSAL SYMPTOMS: ICD-10-CM

## 2022-12-05 DIAGNOSIS — M51.37 DEGENERATION OF LUMBOSACRAL INTERVERTEBRAL DISC: ICD-10-CM

## 2022-12-05 RX ORDER — ONDANSETRON 4 MG/1
4 TABLET, ORALLY DISINTEGRATING ORAL EVERY 8 HOURS PRN
Qty: 20 TABLET | Refills: 1 | OUTPATIENT
Start: 2022-12-05

## 2022-12-05 RX ORDER — PANTOPRAZOLE SODIUM 40 MG/1
TABLET, DELAYED RELEASE ORAL
Qty: 30 TABLET | Refills: 5 | OUTPATIENT
Start: 2022-12-05

## 2022-12-05 RX ORDER — ESTRADIOL/NORETHINDRONE ACETATE TRANSDERMAL SYSTEM .05; .25 MG/D; MG/D
PATCH, EXTENDED RELEASE TRANSDERMAL
Qty: 8 PATCH | Refills: 1 | OUTPATIENT
Start: 2022-12-05

## 2022-12-05 RX ORDER — CYCLOBENZAPRINE HCL 10 MG
TABLET ORAL
Qty: 30 TABLET | Refills: 2 | OUTPATIENT
Start: 2022-12-05

## 2022-12-05 RX ORDER — PREGABALIN 150 MG/1
CAPSULE ORAL
Qty: 60 CAPSULE | Refills: 0 | OUTPATIENT
Start: 2022-12-05 | End: 2023-01-04

## 2022-12-05 RX ORDER — ASPIRIN 81 MG/1
TABLET, CHEWABLE ORAL
Qty: 100 TABLET | Refills: 0 | OUTPATIENT
Start: 2022-12-05

## 2022-12-05 NOTE — TELEPHONE ENCOUNTER
Overdue for an appointment with fasting labs Recommended close follow-up with primary care provider within 3 days of discharge  Recommended follow-up with Colorectal surgery Dr Teresa Das out pt  Follow-up with GI outpatient  Recommended frequent Sitz bath, keep the rectal area clean after having bowel movements

## 2022-12-05 NOTE — TELEPHONE ENCOUNTER
Past Visits     Date Provider Specialty Visit Type Primary Dx   02/17/2022 Taqueria Cuellar MD Family Medicine Telemedicine Acute bilateral low back pain, unspecified whether sciatica present      Next OV None

## 2022-12-05 NOTE — TELEPHONE ENCOUNTER
Past Visits    Date Provider Specialty Visit Type Primary Dx   02/17/2022 Obdulia Yuen MD Family Medicine Telemedicine Acute bilateral low back pain, unspecified whether sciatica present     Next OV None

## 2022-12-05 NOTE — TELEPHONE ENCOUNTER
Past Visits     Date Provider Specialty Visit Type Primary Dx   02/17/2022 Antonia Rizo MD Family Medicine Telemedicine Acute bilateral low back pain, unspecified whether sciatica present      Next OV None

## 2022-12-07 ENCOUNTER — TELEPHONE (OUTPATIENT)
Dept: FAMILY MEDICINE CLINIC | Age: 57
End: 2022-12-07

## 2022-12-07 NOTE — TELEPHONE ENCOUNTER
LM for patient to call the office to schedule a appointment with fasting labs.  CA 12/7/22 @ 12:09 pm

## 2022-12-08 ENCOUNTER — TELEPHONE (OUTPATIENT)
Dept: FAMILY MEDICINE CLINIC | Age: 57
End: 2022-12-08

## 2022-12-08 DIAGNOSIS — N95.1 MENOPAUSAL SYMPTOMS: ICD-10-CM

## 2022-12-08 RX ORDER — ESTRADIOL/NORETHINDRONE ACETATE TRANSDERMAL SYSTEM .05; .25 MG/D; MG/D
PATCH, EXTENDED RELEASE TRANSDERMAL
Qty: 8 PATCH | Refills: 1 | Status: SHIPPED | OUTPATIENT
Start: 2022-12-08

## 2022-12-08 NOTE — TELEPHONE ENCOUNTER
1/17/2023  7:40 AM OFFICE VISIT Mary Bejarano MD     Appointment Notes:    medication refills anxietyfasting labs

## 2022-12-08 NOTE — TELEPHONE ENCOUNTER
1/17/2023  7:40 AM OFFICE VISIT Augustin Partida MD     Appointment Notes:    medication refills anxietyfasting labs

## 2022-12-08 NOTE — TELEPHONE ENCOUNTER
1/17/2023  7:40 AM OFFICE VISIT Mahamed Brower MD    Appointment Notes:    medication refills anxietyfasting labs

## 2022-12-08 NOTE — TELEPHONE ENCOUNTER
Patient has an appt scheduled for 1-17-22 with dr. Connie Padilla for a follow up and fasting labs    She is in her 90 days at a new job and can be late or use PTO yet. She needs her combipatch refilled prior to that appt     [id number] [place], [place] - [facility] [address].  Quinn Pacheco [phone] - F [phone]

## 2022-12-27 NOTE — TELEPHONE ENCOUNTER
LV 2/17/22 WITH DR LOPEZ NV NONE Skin Substitute Injection Text: The defect edges were debeveled with a #15 scalpel blade.  Given the location of the defect, shape of the defect and the proximity to free margins a skin substitute micronized graft was deemed most appropriate.  The entire vial contents were admixed with 3.0ccs of sterile saline and then injected subcutaneously throughout the entire wound bed.

## 2023-01-01 DIAGNOSIS — E11.69 DIABETES MELLITUS TYPE 2 IN OBESE (HCC): ICD-10-CM

## 2023-01-01 DIAGNOSIS — E66.9 DIABETES MELLITUS TYPE 2 IN OBESE (HCC): ICD-10-CM

## 2023-01-03 RX ORDER — BLOOD SUGAR DIAGNOSTIC
STRIP MISCELLANEOUS
Qty: 50 STRIP | Refills: 23 | Status: SHIPPED | OUTPATIENT
Start: 2023-01-03

## 2023-03-08 NOTE — PATIENT INSTRUCTIONS
GENERAL OFFICE POLICIES      Telephone Calls: Messages will be answered within 1-2 business days, unless the provider is out of the office. If it is urgent a covering provider will answer. (this does not include Medication refills). MyChart: We recommend all patients sign up for ChartITrighthart. Through this portal you can see your lab results, request refills, schedule appointments, pay your bill and send messages to the office. [employer]right[employer] messages will be answered within 1-2 business days unless the provider is out of the office. For urgent matters, please call the office. Appointments:  All appointments must be scheduled. We ask all patients to schedule their next follow up appointment before they leave the office to make sure you will be able to be seen before you run out of medications. 24 hours notice is required to cancel or reschedule an appointment to avoid being marked as a no show. You may be dismissed from the practice after 3 no shows. LATE for Appointment: If you are 15 or more minutes late for your appointment, you may be asked to reschedule. MA/LAB APPTS: Must be scheduled, cannot accept walk in lab visits. We only draw labs for patients established in our office. We only do injections for medications ordered by our office. Acute Sick Visits:  Nothing other than acute complaint will be addressed at this visit. TRADITIONAL MEDICARE  DOES NOT COVER PHYSICALS  MEDICARE WELLNESS VISITS: These are NOT physicals but the free annual visit offered by Medicare to discuss wellness issues. Medication refills, checkups, etc. will not be addressed during this visit. Medication Refills: Refills are handled electronically so please contact your pharmacy for medication refills even if current refills have been exhausted. If you are on a controlled medication you will be referred to a specialist (pain specialist, psychiatry, etc). Forms:  There is a $35 fee to fill out FMLA/Disability paperwork, payable at time of . Instead of the fee, you can choose to have the paperwork filled out during a separate office visit that is for filling out the paperwork only. Medication Samples: This office does not carry medication samples. If you need assistance in getting your medications, then please let the medical assistant know so they can help you sign up for a drug assistance program that can help get medications at a reduced cost or even free (if you qualify). Workman's Comp Claims: We do not handle workman's comp cases or claims. You will need to go to an urgent care to be seen or to whomever your employer uses. General - Any abusive/rude behavior toward staff/providers may be cause for dismissal.   [facility] may receive a survey regarding the care you received during your visit. Your input is valuable to us. We encourage you to complete and return your survey. We hope you will choose us in the future for your healthcare needs.

## 2023-03-09 ENCOUNTER — OFFICE VISIT (OUTPATIENT)
Dept: FAMILY MEDICINE CLINIC | Age: 58
End: 2023-03-09
Payer: COMMERCIAL

## 2023-03-09 VITALS
SYSTOLIC BLOOD PRESSURE: 120 MMHG | DIASTOLIC BLOOD PRESSURE: 72 MMHG | BODY MASS INDEX: 39.59 KG/M2 | OXYGEN SATURATION: 94 % | HEART RATE: 74 BPM | HEIGHT: 59 IN

## 2023-03-09 DIAGNOSIS — Z72.0 TOBACCO ABUSE: ICD-10-CM

## 2023-03-09 DIAGNOSIS — F43.10 PTSD (POST-TRAUMATIC STRESS DISORDER): ICD-10-CM

## 2023-03-09 DIAGNOSIS — J41.0 SIMPLE CHRONIC BRONCHITIS (HCC): ICD-10-CM

## 2023-03-09 DIAGNOSIS — E66.01 SEVERE OBESITY (BMI 35.0-39.9) WITH COMORBIDITY (HCC): ICD-10-CM

## 2023-03-09 DIAGNOSIS — R73.03 PREDIABETES: ICD-10-CM

## 2023-03-09 DIAGNOSIS — F31.32 BIPOLAR DISORDER WITH MODERATE DEPRESSION (HCC): ICD-10-CM

## 2023-03-09 DIAGNOSIS — K21.9 CHRONIC GERD: ICD-10-CM

## 2023-03-09 DIAGNOSIS — F41.9 ANXIETY: Primary | ICD-10-CM

## 2023-03-09 DIAGNOSIS — M47.816 LUMBAR SPONDYLOSIS: ICD-10-CM

## 2023-03-09 DIAGNOSIS — M47.817 LUMBOSACRAL SPONDYLOSIS WITHOUT MYELOPATHY: ICD-10-CM

## 2023-03-09 DIAGNOSIS — E55.9 VITAMIN D DEFICIENCY: ICD-10-CM

## 2023-03-09 DIAGNOSIS — M51.37 DEGENERATION OF LUMBOSACRAL INTERVERTEBRAL DISC: ICD-10-CM

## 2023-03-09 DIAGNOSIS — E78.00 HYPERCHOLESTEREMIA: ICD-10-CM

## 2023-03-09 PROBLEM — E66.9 DIABETES MELLITUS TYPE 2 IN OBESE (HCC): Status: RESOLVED | Noted: 2019-06-06 | Resolved: 2023-03-09

## 2023-03-09 PROBLEM — E11.69 DIABETES MELLITUS TYPE 2 IN OBESE (HCC): Status: RESOLVED | Noted: 2019-06-06 | Resolved: 2023-03-09

## 2023-03-09 LAB
A/G RATIO: 1.6 (ref 1.1–2.2)
ALBUMIN SERPL-MCNC: 4.9 G/DL (ref 3.4–5)
ALP BLD-CCNC: 119 U/L (ref 40–129)
ALT SERPL-CCNC: 19 U/L (ref 10–40)
ANION GAP SERPL CALCULATED.3IONS-SCNC: 15 MMOL/L (ref 3–16)
AST SERPL-CCNC: 18 U/L (ref 15–37)
BILIRUB SERPL-MCNC: 0.4 MG/DL (ref 0–1)
BUN BLDV-MCNC: 14 MG/DL (ref 7–20)
CALCIUM SERPL-MCNC: 9.9 MG/DL (ref 8.3–10.6)
CHLORIDE BLD-SCNC: 101 MMOL/L (ref 99–110)
CHOLESTEROL, TOTAL: 296 MG/DL (ref 0–199)
CO2: 23 MMOL/L (ref 21–32)
CREAT SERPL-MCNC: 0.7 MG/DL (ref 0.6–1.1)
GFR SERPL CREATININE-BSD FRML MDRD: >60 ML/MIN/{1.73_M2}
GLUCOSE BLD-MCNC: 85 MG/DL (ref 70–99)
HDLC SERPL-MCNC: 56 MG/DL (ref 40–60)
LDL CHOLESTEROL CALCULATED: 215 MG/DL
POTASSIUM SERPL-SCNC: 4.8 MMOL/L (ref 3.5–5.1)
SODIUM BLD-SCNC: 139 MMOL/L (ref 136–145)
TOTAL PROTEIN: 8 G/DL (ref 6.4–8.2)
TRIGL SERPL-MCNC: 126 MG/DL (ref 0–150)
VITAMIN D 25-HYDROXY: 40.6 NG/ML
VLDLC SERPL CALC-MCNC: 25 MG/DL

## 2023-03-09 PROCEDURE — 36415 COLL VENOUS BLD VENIPUNCTURE: CPT | Performed by: FAMILY MEDICINE

## 2023-03-09 PROCEDURE — G8419 CALC BMI OUT NRM PARAM NOF/U: HCPCS | Performed by: FAMILY MEDICINE

## 2023-03-09 PROCEDURE — 4004F PT TOBACCO SCREEN RCVD TLK: CPT | Performed by: FAMILY MEDICINE

## 2023-03-09 PROCEDURE — 99214 OFFICE O/P EST MOD 30 MIN: CPT | Performed by: FAMILY MEDICINE

## 2023-03-09 PROCEDURE — 3078F DIAST BP <80 MM HG: CPT | Performed by: FAMILY MEDICINE

## 2023-03-09 PROCEDURE — 3023F SPIROM DOC REV: CPT | Performed by: FAMILY MEDICINE

## 2023-03-09 PROCEDURE — 3074F SYST BP LT 130 MM HG: CPT | Performed by: FAMILY MEDICINE

## 2023-03-09 PROCEDURE — 3017F COLORECTAL CA SCREEN DOC REV: CPT | Performed by: FAMILY MEDICINE

## 2023-03-09 PROCEDURE — G8484 FLU IMMUNIZE NO ADMIN: HCPCS | Performed by: FAMILY MEDICINE

## 2023-03-09 PROCEDURE — G8427 DOCREV CUR MEDS BY ELIG CLIN: HCPCS | Performed by: FAMILY MEDICINE

## 2023-03-09 RX ORDER — CYCLOBENZAPRINE HCL 10 MG
TABLET ORAL
Qty: 30 TABLET | Refills: 2 | Status: SHIPPED | OUTPATIENT
Start: 2023-03-09

## 2023-03-09 RX ORDER — DULOXETIN HYDROCHLORIDE 30 MG/1
30 CAPSULE, DELAYED RELEASE ORAL DAILY
Qty: 30 CAPSULE | Refills: 3 | Status: SHIPPED | OUTPATIENT
Start: 2023-03-09

## 2023-03-09 RX ORDER — DOXEPIN HYDROCHLORIDE 10 MG/1
10 CAPSULE ORAL NIGHTLY
Qty: 30 CAPSULE | Refills: 3 | Status: SHIPPED | OUTPATIENT
Start: 2023-03-09

## 2023-03-09 RX ORDER — DULOXETIN HYDROCHLORIDE 60 MG/1
60 CAPSULE, DELAYED RELEASE ORAL DAILY
Qty: 30 CAPSULE | Refills: 3 | Status: SHIPPED | OUTPATIENT
Start: 2023-03-09

## 2023-03-09 RX ORDER — PREGABALIN 150 MG/1
CAPSULE ORAL
Qty: 60 CAPSULE | Refills: 2 | Status: SHIPPED | OUTPATIENT
Start: 2023-03-09 | End: 2023-03-09

## 2023-03-09 RX ORDER — PANTOPRAZOLE SODIUM 40 MG/1
TABLET, DELAYED RELEASE ORAL
Qty: 30 TABLET | Refills: 2 | Status: SHIPPED | OUTPATIENT
Start: 2023-03-09

## 2023-03-09 RX ORDER — IBUPROFEN AND FAMOTIDINE 26.6; 8 MG/1; MG/1
TABLET, FILM COATED ORAL
Qty: 90 TABLET | Refills: 2 | Status: SHIPPED | OUTPATIENT
Start: 2023-03-09

## 2023-03-09 SDOH — ECONOMIC STABILITY: FOOD INSECURITY: WITHIN THE PAST 12 MONTHS, THE FOOD YOU BOUGHT JUST DIDN'T LAST AND YOU DIDN'T HAVE MONEY TO GET MORE.: NEVER TRUE

## 2023-03-09 SDOH — ECONOMIC STABILITY: INCOME INSECURITY: HOW HARD IS IT FOR YOU TO PAY FOR THE VERY BASICS LIKE FOOD, HOUSING, MEDICAL CARE, AND HEATING?: NOT HARD AT ALL

## 2023-03-09 SDOH — ECONOMIC STABILITY: FOOD INSECURITY: WITHIN THE PAST 12 MONTHS, YOU WORRIED THAT YOUR FOOD WOULD RUN OUT BEFORE YOU GOT MONEY TO BUY MORE.: NEVER TRUE

## 2023-03-09 SDOH — ECONOMIC STABILITY: HOUSING INSECURITY
IN THE LAST 12 MONTHS, WAS THERE A TIME WHEN YOU DID NOT HAVE A STEADY PLACE TO SLEEP OR SLEPT IN A SHELTER (INCLUDING NOW)?: NO

## 2023-03-09 ASSESSMENT — PATIENT HEALTH QUESTIONNAIRE - PHQ9
10. IF YOU CHECKED OFF ANY PROBLEMS, HOW DIFFICULT HAVE THESE PROBLEMS MADE IT FOR YOU TO DO YOUR WORK, TAKE CARE OF THINGS AT HOME, OR GET ALONG WITH OTHER PEOPLE: 0
4. FEELING TIRED OR HAVING LITTLE ENERGY: 0
2. FEELING DOWN, DEPRESSED OR HOPELESS: 0
SUM OF ALL RESPONSES TO PHQ9 QUESTIONS 1 & 2: 0
SUM OF ALL RESPONSES TO PHQ QUESTIONS 1-9: 0
SUM OF ALL RESPONSES TO PHQ QUESTIONS 1-9: 0
1. LITTLE INTEREST OR PLEASURE IN DOING THINGS: 0
SUM OF ALL RESPONSES TO PHQ QUESTIONS 1-9: 0
8. MOVING OR SPEAKING SO SLOWLY THAT OTHER PEOPLE COULD HAVE NOTICED. OR THE OPPOSITE, BEING SO FIGETY OR RESTLESS THAT YOU HAVE BEEN MOVING AROUND A LOT MORE THAN USUAL: 0
SUM OF ALL RESPONSES TO PHQ QUESTIONS 1-9: 0
5. POOR APPETITE OR OVEREATING: 0
7. TROUBLE CONCENTRATING ON THINGS, SUCH AS READING THE NEWSPAPER OR WATCHING TELEVISION: 0
3. TROUBLE FALLING OR STAYING ASLEEP: 0
9. THOUGHTS THAT YOU WOULD BE BETTER OFF DEAD, OR OF HURTING YOURSELF: 0
6. FEELING BAD ABOUT YOURSELF - OR THAT YOU ARE A FAILURE OR HAVE LET YOURSELF OR YOUR FAMILY DOWN: 0

## 2023-03-09 NOTE — PROGRESS NOTES
[facility]  Clinic Note    Date: 3/9/2023                                               Subjective:     Chief Complaint   Patient presents with    Anxiety     WANTS SOMETHING FOR ANXIETY, STATES SHE HAS MEDICAL MARIJUANA CARD BUT HER JOB WILL NOT ALLOW    Pain     WANTS SOMETHING FOR PAIN, MENTIONED VICODIN      HPI    Life is good overall w/ present job/ living w/ granddaughter  Labs 6/19 reviewed  Lumbar ct 2/28/22 - L4-L5 left foramenal and moderate canal stenosis  [address] OUTREACH  Working at [employer] again - 2 jobs. Depression is much better - believes hormonal cause to depression  Mom had bipolar disorder and took her life  Has good support w/ coworkers - custody of 15 yo granddaughter. Daughter struggles w/ addiction  Hoping to buy house. Not sleeping well - sleeps on couch for years as furnace issues for 2 years but working okay now - a lot on mind makes hard to sleep  Not sleeping in bed - sleeping in couch. Hard to stay asleep - more nocturia  Up by 6 each am.  Concerns w/ medical marijuana on job - trying to cut back  Still has low back pain - using voltaren gel which helps some - some mornings it is worse. Would like vicodin to use low dose to use prn  Walking a lot - medical marijuana has helped but won't be able to continue w/ present employer. Can't tolerate ultram and afraid of meds/ drugs  Dale Apley last year - slipped on black ice - went to [place]   L4-5 problem - had fusion L5-S1 in past - hardware removed 1 year later in 2018  Bs checks periodically - tries to watch carbs. Taking vit d usually-does forget at times. Hx of ptsd and hx of alcohol abuse  Would like to use ativan for anxiety  Taking lyrica about 4/ week. Occ uses muscle relaxant  May have few month gap in insurance this summer. Hydroxyzine makes her stupid. Interested in weight loss  Melatonin not helping for sleep.       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 9) with comorbidity ([address] 06/06/2019    Diabetes mellitus type 2 in obese ([address] 06/06/2019    Snoring 06/06/2019    Mixed hyperlipidemia     Tendinopathy of left rotator cuff 03/30/2019    Esophageal dysphagia 02/26/2019    Allergic sinusitis 02/26/2019    PTSD (post-traumatic stress disorder) 08/23/2018    Personality disorder ([address] 08/23/2018    Essential hypertension 08/23/2018    Chronic GERD 08/23/2018    Simple chronic bronchitis ([address] 08/23/2018    Bipolar disorder ([address] 08/23/2018    Anxiety 08/23/2018    Mononeuropathy of right sciatic nerve 09/12/2017    Lumbosacral radiculopathy 09/12/2017    Lumbar spondylosis 07/21/2017    Intervertebral disc disorder with radiculopathy of lumbosacral region 07/21/2017    Lumbosacral spondylosis without myelopathy 04/04/2017    Lumbar radiculopathy 04/04/2017    Degeneration of lumbosacral intervertebral disc 04/04/2017    Sacroiliac joint dysfunction of left side 01/06/2017    Tobacco abuse 04/10/2014    Depression 11/30/2012     Past Medical History:   Diagnosis Date    Anxiety     Asthma     Bipolar disorder ([address]     CAD (coronary artery disease)     Chronic back pain     COPD (chronic obstructive pulmonary disease) ([address]     Depression     Diabetes mellitus ([facility] GERD (gastroesophageal reflux disease)     Hypertension     Mixed hyperlipidemia     Myocardial infarction [facility])     PTSD (post-traumatic stress disorder)      Past Surgical History:   Procedure Laterality Date    BACK SURGERY       SECTION      CHOLECYSTECTOMY      HIP ARTHROSCOPY Left     SPINAL FUSION      end  hardware removed - spinal fusion     UPPER GASTROINTESTINAL ENDOSCOPY N/A 2019    EGD BIOPSY performed by Hailee Vaughan MD at [address] Outpatient Visit on 2019   Component Date Value Ref Range Status    Vitamin K1 2019 1.63  0.22 - 4.88 nmol/L Final    Comment: INTERPRETIVE INFORMATION: Vitamin K1, Serum  Vitamin K concentration is reported as nanomoles per liter (nmol/L). To  convert concentration to nanograms per milliliter (ng/mL), multiply the  result by 0.45. See Compliance Statement B: [employer]/CS  Performed by [facility] [address] [place] [address] Road [phone]  www. Karen Bee MD - Lab. Director      Alpha-Tocopherol 2019 11.1  5.5 - 18.0 mg/L Final    Comment: Test developed and characteristics determined by Mando Howell. See  Compliance Statement B: [employer]/CS      Gamma-Tocopherol 2019 3.3  0.0 - 6.0 mg/L Final    Comment: Performed by [facility]er , [address] [phone]  www. Karen Bee MD - Lab. Director      Vit D, 25-Hydroxy 2019 17.4 (A)  >=30 ng/mL Final    Comment: <=20 ng/mL. ........... [employer] Deficient  21-29 ng/mL. ......... [employer] Insufficient  >=30 ng/mL. ........ [employer] Sufficient      Vitamin B-12 2019 471  211 - 911 pg/mL Final    Folate 2019 16.73  4.78 - 24.20 ng/mL Final    Comment: Effective 15-16 10:00am EST  Please note reference ranges have  changed for Folate.       Vitamin B1,Whole Blood 2019 118  70 - 180 nmol/L Final    Comment: INTERPRETIVE INFORMATION: Vitamin B1, Whole Blood  This assay measures the concentration of thiamine diphosphate (TDP),  the  primary active form of vitamin B1. Approximately 90 percent of vitamin  B1  present in whole blood is TDP. Thiamine and thiamine monophosphate,  which  comprise the remaining 10 percent, are not measured. Test developed and characteristics determined by [address]fin. See  Compliance Statement B: [employer]  Performed by [address]  [facility], [address] [facility] [address] [phone]  www. Tristan Pereira MD - Lab. Director      Vitamin A 06/26/2019 0.48  0.30 - 1.20 mg/L Final    Vitamin A, Interp 06/26/2019 Normal   Final    Comment: Test developed and characteristics determined by [address]. See  Compliance Statement B: [employer]/CS  Performed by [address]  [place] [address], [address] [phone]  www. Tristan Pereira MD - Lab.  Director      RETINYL PALMITATE 06/26/2019 0.02  0.00 - 0.10 mg/L Final    TSH 06/26/2019 1.23  0.27 - 4.20 uIU/mL Final    Cholesterol, Total 06/26/2019 285 (A)  0 - 199 mg/dL Final    Triglycerides 06/26/2019 175 (A)  0 - 150 mg/dL Final    HDL 06/26/2019 46  40 - 60 mg/dL Final    LDL Calculated 06/26/2019 204 (A)  <100 mg/dL Final    VLDL Cholesterol Calculated 06/26/2019 35  Not Established mg/dL Final    Iron 06/26/2019 70  37 - 145 ug/dL Final    TIBC 06/26/2019 327  260 - 445 ug/dL Final    Iron Saturation 06/26/2019 21  15 - 50 % Final    Hemoglobin A1C 06/26/2019 5.8  See comment % Final    Comment: Comment:  Diagnosis of Diabetes: > or = 6.5%  Increased risk of diabetes (Prediabetes): 5.7-6.4%  Glycemic Control: Nonpregnant Adults: <7.0%                    Pregnant: <6.0%        eAG 06/26/2019 119.8  mg/dL Final    Sodium 06/26/2019 137  136 - 145 mmol/L Final    Potassium 06/26/2019 4.2  3.5 - 5.1 mmol/L Final    Chloride 06/26/2019 101  99 - 110 mmol/L Final    CO2 06/26/2019 21  21 - 32 mmol/L Final    Anion Gap 06/26/2019 15  3 - 16 Final    Glucose 06/26/2019 93  70 - 99 mg/dL Final    BUN 06/26/2019 14  7 - 20 mg/dL Final    Creatinine 06/26/2019 0.6  0.6 - 1.1 mg/dL Final    GFR Non- 06/26/2019 >60  >60 Final    Comment: >60 mL/min/1.73m2 EGFR, calc. for ages 25 and older using the  MDRD formula (not corrected for weight), is valid for stable  renal function. GFR  06/26/2019 >60  >60 Final    Comment: Chronic Kidney Disease: less than 60 ml/min/1.73 sq.m. Kidney Failure: less than 15 ml/min/1.73 sq.m. Results valid for patients 18 years and older.       Calcium 06/26/2019 9.5  8.3 - 10.6 mg/dL Final    Total Protein 06/26/2019 7.7  6.4 - 8.2 g/dL Final    Albumin 06/26/2019 4.4  3.4 - 5.0 g/dL Final    Albumin/Globulin Ratio 06/26/2019 1.3  1.1 - 2.2 Final    Total Bilirubin 06/26/2019 0.3  0.0 - 1.0 mg/dL Final    Alkaline Phosphatase 06/26/2019 119  40 - 129 U/L Final    ALT 06/26/2019 13  10 - 40 U/L Final    AST 06/26/2019 16  15 - 37 U/L Final    Globulin 06/26/2019 3.3  g/dL Final    WBC 06/26/2019 8.2  4.0 - 11.0 K/uL Final    RBC 06/26/2019 4.57  4.00 - 5.20 M/uL Final    Hemoglobin 06/26/2019 14.4  12.0 - 16.0 g/dL Final    Hematocrit 06/26/2019 41.9  36.0 - 48.0 % Final    MCV 06/26/2019 91.7  80.0 - 100.0 fL Final    MCH 06/26/2019 31.4  26.0 - 34.0 pg Final    MCHC 06/26/2019 34.3  31.0 - 36.0 g/dL Final    RDW 06/26/2019 14.9  12.4 - 15.4 % Final    Platelets 80/75/4985 307  135 - 450 K/uL Final    MPV 06/26/2019 8.7  5.0 - 10.5 fL Final    Neutrophils % 06/26/2019 54.8  % Final    Lymphocytes % 06/26/2019 34.5  % Final    Monocytes % 06/26/2019 6.3  % Final    Eosinophils % 06/26/2019 3.6  % Final    Basophils % 06/26/2019 0.8  % Final    Neutrophils Absolute 06/26/2019 4.5  1.7 - 7.7 K/uL Final    Lymphocytes Absolute 06/26/2019 2.8  1.0 - 5.1 K/uL Final    Monocytes Absolute 06/26/2019 0.5  0.0 - 1.3 K/uL Final    Eosinophils Absolute 06/26/2019 0.3  0.0 - 0.6 K/uL Final    Basophils Absolute 06/26/2019 0.1  0.0 - 0.2 K/uL Final     Family History   Problem Relation Age of Onset    Other Mother         [address] [facility] [place]     Alcohol Abuse Mother     Mental Illness Mother     Hypertension Father     Elevated Lipids Father     Alcohol Abuse Father     Heart Surgery Father 39        CABG    Diabetes type 2  Father     Dementia Father     Coronary Art Dis Father     Cancer Sister 27        BREAST     Kidney Disease Daughter      Current Outpatient Medications   Medication Sig Dispense Refill    blood glucose test strips (ONETOUCH ULTRA) strip TEST TWICE A DAY 50 strip 23    estradiol-norethindrone (COMBIPATCH) 0.05-0.25 MG/DAY Use twice weekly as directed to skin 8 patch 1    albuterol sulfate HFA (PROVENTIL;VENTOLIN;PROAIR) 108 (90 Base) MCG/ACT inhaler Inhale 2 puffs into the lungs every 6 hours as needed for Wheezing 18 g 3    ondansetron (ZOFRAN ODT) 4 MG disintegrating tablet Take 1 tablet by mouth every 8 hours as needed for Nausea or Vomiting 20 tablet 1    cyclobenzaprine (FLEXERIL) 10 MG tablet TAKE 1 TABLET BY MOUTH THREE TIMES A DAY AS NEEDED FOR MUSCLE SPASMS 30 tablet 2    [facility]S MELATONIN 5 MG TABS tablet TAKE 1 TABLET BY MOUTH NIGHTLY 30 tablet 5    vitamin D (ERGOCALCIFEROL) 1.25 MG (28082 UT) CAPS capsule Take 1 capsule by mouth once a week 12 capsule 1    ASPIRIN LOW DOSE 81 MG chewable tablet CHEW 1 TABLET BY MOUTH EVERY  tablet 0    diclofenac sodium (VOLTAREN) 1 % GEL APPLY 4 GRAMS TOPICALLY 4 TIMES DAILY AS NEEDED FOR PAIN 100 g 5    lidocaine (XYLOCAINE) 2 % jelly APPLY TO AFFECTED AREA(S) EVERY DAY AS NEEDED 30 g 0    Lancets MISC 1 each by Does not apply route 2 times daily 100 each 5    pantoprazole (PROTONIX) 40 MG tablet TAKE 1 TABLET BY MOUTH EVERY MORNING BEFORE BREAKFAST 30 tablet 5    Blood Pressure KIT Use as directed weekly routinely and as needed symptoms of high/ low bp 1 kit 0    ibuprofen (ADVIL;MOTRIN) 800 MG tablet Take 1 tablet by mouth every 8 hours as needed for Pain 90 tablet 1 progesterone (PROMETRIUM) 200 MG CAPS capsule Take 1 capsule by mouth nightly 12 capsule 0    blood glucose monitor kit and supplies Dispense sufficient amount for indicated testing frequency plus additional to accommodate PRN testing needs. Dispense all needed supplies to include: monitor, strips, lancing device, lancets, control solutions, alcohol swabs. 1 kit 0    [employer]Care Alcohol Swabs 70 % PADS 1 each by Does not apply route 2 times daily 1 each 2    loratadine (CLARITIN) 10 MG tablet Take 1 tablet by mouth daily 30 tablet 5    EPINEPHrine (EPIPEN) 0.3 MG/0.3ML SOAJ injection Use as directed for allergic reaction 2 each 1    glycopyrrolate-formoterol (BEVESPI) 9-4.8 MCG/ACT AERO Inhale 2 puffs into the lungs 2 times daily (Patient not taking: Reported on 3/9/2023) 1 each 5     No current facility-administered medications for this visit. Allergies   Allergen Reactions    Abilify [Aripiprazole]     Celebrex [Celecoxib] Swelling    Gabapentin Other (See Comments)     MAKES HER FEEL LIKE SHE IS OUT OF IT, CANNOT FOCUS     Imdur [Isosorbide Dinitrate]     Voltaren [Diclofenac Sodium]     Lamictal [Lamotrigine] Rash    Ultram [Tramadol] Nausea And Vomiting       Review of Systems    Objective:  /72 (Site: Left Upper Arm, Position: Sitting, Cuff Size: Medium Adult)   Ht 4' 11\" (1.499 m)   LMP 03/09/2016 (Approximate)   BMI 39.59 kg/m²     BP Readings from Last 3 Encounters:   03/09/23 120/72   09/27/19 110/68   09/16/19 138/80       Pulse Readings from Last 3 Encounters:   09/27/19 76   09/16/19 76   09/13/19 72       Wt Readings from Last 3 Encounters:   09/27/19 196 lb (88.9 kg)   09/16/19 198 lb 9.6 oz (90.1 kg)   08/12/19 197 lb 12.8 oz (89.7 kg)       Physical Exam  Constitutional:       General: She is not in acute distress. Appearance: She is well-developed. HENT:      Head: Normocephalic and atraumatic. Mouth/Throat:      Pharynx: No oropharyngeal exudate.    Eyes:      General: No scleral icterus. Conjunctiva/sclera: Conjunctivae normal.   Neck:      Thyroid: No thyromegaly. Cardiovascular:      Rate and Rhythm: Normal rate and regular rhythm. Heart sounds: Normal heart sounds. No murmur heard. Pulmonary:      Effort: Pulmonary effort is normal. No respiratory distress. Breath sounds: Normal breath sounds. No wheezing or rales. Abdominal:      General: Bowel sounds are normal. There is no distension. Palpations: Abdomen is soft. Tenderness: There is no abdominal tenderness. Musculoskeletal:      Comments: Nl gait  No ttp across back/ spine or si joints  Rom fairly good   Lymphadenopathy:      Cervical: No cervical adenopathy. Skin:     General: Skin is warm and dry. Neurological:      Mental Status: She is alert and oriented to person, place, and time. Assessment/Plan:      Diagnosis Orders   1. Anxiety        2. Bipolar disorder with moderate depression ([address]        3. Simple chronic bronchitis ([address]        4. Prediabetes        5. Vitamin D deficiency        6. Hypercholesteremia        7. PTSD (post-traumatic stress disorder)        8. Lumbosacral spondylosis without myelopathy        9. Tobacco abuse        10. Chronic GERD        11. Severe obesity (BMI 35.0-39. 9) with comorbidity ([address]        12. Lumbar spondylosis        13. Degeneration of lumbosacral intervertebral disc            Lyrica 150 bid regularly w/ flexeril for pain along w/ voltaren/ topical analgesics / lidoderm  Unable to tolerate cymbalta/ ? Gi issues - will try again at 30mg dose and bump to 60/d as tolerated    Duexis - ibuprofen plus pepcid trial encouraged  Tylenol prn  Doxepin for sleep.   Encourage PT for back and possible PMR  Hold on controlled meds at present  Still smoking  oarrs reviewed and results c/w rx'd meds  On medical marijuana - filled last 2/1/23  Marcela Ignacio MD, MD  3/9/2023  9:40 AM

## 2023-03-10 LAB
ESTIMATED AVERAGE GLUCOSE: 111.2 MG/DL
HBA1C MFR BLD: 5.5 %

## 2023-03-12 RX ORDER — ROSUVASTATIN CALCIUM 20 MG/1
20 TABLET, COATED ORAL NIGHTLY
Qty: 30 TABLET | Refills: 3 | Status: SHIPPED | OUTPATIENT
Start: 2023-03-12

## 2023-03-13 ENCOUNTER — PATIENT MESSAGE (OUTPATIENT)
Dept: FAMILY MEDICINE CLINIC | Age: 58
End: 2023-03-13

## 2023-03-13 RX ORDER — MECLIZINE HYDROCHLORIDE 25 MG/1
25 TABLET ORAL EVERY 6 HOURS PRN
Qty: 30 TABLET | Refills: 0 | Status: SHIPPED | OUTPATIENT
Start: 2023-03-13 | End: 2023-03-23

## 2023-03-13 NOTE — TELEPHONE ENCOUNTER
From: Surekha Kilgore  To: Dr. Laith Ferreira  Sent: 3/13/2023 12:53 PM EDT  Subject: Vertigo     I have been having issues since Saturday with vertigo again. I had some meclizine left but I really could use a refill. It was not on my list of medications. I have a couple left. If you would do this I would greatly appreciate it :-) there were a couple other prescriptions that did not get refilled and I requested refills for those.

## 2023-03-14 RX ORDER — ONDANSETRON 4 MG/1
4 TABLET, ORALLY DISINTEGRATING ORAL EVERY 8 HOURS PRN
Qty: 20 TABLET | Refills: 1 | Status: SHIPPED | OUTPATIENT
Start: 2023-03-14

## 2023-03-28 RX ORDER — LIDOCAINE 3% TOPICAL ANESTHETIC CREAM 0.03 G/G
CREAM TOPICAL
Qty: 28.35 G | Refills: 0 | Status: SHIPPED | OUTPATIENT
Start: 2023-03-28

## 2023-04-30 DIAGNOSIS — N95.1 MENOPAUSAL SYMPTOMS: ICD-10-CM

## 2023-05-01 RX ORDER — ESTRADIOL/NORETHINDRONE ACETATE TRANSDERMAL SYSTEM .05; .25 MG/D; MG/D
PATCH, EXTENDED RELEASE TRANSDERMAL
Qty: 8 PATCH | Refills: 1 | Status: SHIPPED | OUTPATIENT
Start: 2023-05-01

## 2023-07-01 RX ORDER — LIDOCAINE HYDROCHLORIDE 30 MG/G
CREAM TOPICAL
Qty: 28.35 G | Status: CANCELLED | OUTPATIENT
Start: 2023-07-01

## 2023-07-03 RX ORDER — ONDANSETRON 4 MG/1
TABLET, ORALLY DISINTEGRATING ORAL
Qty: 20 TABLET | Refills: 1 | Status: SHIPPED | OUTPATIENT
Start: 2023-07-03

## 2023-07-03 RX ORDER — ALBUTEROL SULFATE 90 UG/1
AEROSOL, METERED RESPIRATORY (INHALATION)
Qty: 18 EACH | Refills: 1 | Status: SHIPPED | OUTPATIENT
Start: 2023-07-03

## 2023-07-03 RX ORDER — LIDOCAINE 40 MG/G
CREAM TOPICAL
Qty: 28 G | Refills: 1 | Status: SHIPPED | OUTPATIENT
Start: 2023-07-03

## 2023-07-26 DIAGNOSIS — N95.1 MENOPAUSAL SYMPTOMS: ICD-10-CM

## 2023-07-27 RX ORDER — ESTRADIOL/NORETHINDRONE ACETATE TRANSDERMAL SYSTEM .05; .25 MG/D; MG/D
PATCH, EXTENDED RELEASE TRANSDERMAL
Qty: 8 PATCH | Refills: 1 | Status: SHIPPED | OUTPATIENT
Start: 2023-07-27

## 2023-07-27 RX ORDER — IBUPROFEN AND FAMOTIDINE 26.6; 8 MG/1; MG/1
TABLET, FILM COATED ORAL
Qty: 90 TABLET | Refills: 2 | Status: SHIPPED | OUTPATIENT
Start: 2023-07-27

## 2023-08-10 ENCOUNTER — PATIENT MESSAGE (OUTPATIENT)
Dept: FAMILY MEDICINE CLINIC | Age: 58
End: 2023-08-10

## 2023-08-10 DIAGNOSIS — U07.1 COVID-19: Primary | ICD-10-CM

## 2023-08-10 NOTE — TELEPHONE ENCOUNTER
From: Sully Senters  To: Dr. Troncoso Inez: 8/10/2023 12:09 PM EDT  Subject: Annamarie Welch     I tested positive for COVID yesterday and I am not sure if you want me to have a virtual visit or not. I need a note for work for yesterday I guess till tomorrow. Wednesday through Friday. I should be able to go back to work on Monday correct? I've got the fever chills runny nose and headaches! They are pretty bad. Not bad enough to have to go to the hospital. I just want to sleep.

## 2023-08-17 ENCOUNTER — PATIENT MESSAGE (OUTPATIENT)
Dept: FAMILY MEDICINE CLINIC | Age: 58
End: 2023-08-17

## 2023-08-17 NOTE — TELEPHONE ENCOUNTER
From: Gail Dewey  To: Dr. Nupur Keyes: 8/17/2023 7:51 AM EDT  Subject: Ben Race     I am still unable to return to work. It has been over a week now. I have extreme headaches and very short of breath after doing very little. I'm not sure what to do! I'm afraid I'm going to lose my job but I am not able to work, yet!

## 2023-08-23 ENCOUNTER — TELEPHONE (OUTPATIENT)
Dept: FAMILY MEDICINE CLINIC | Age: 58
End: 2023-08-23
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
## 2023-08-23 NOTE — TELEPHONE ENCOUNTER
CALLED PT SHE STATED DOXEPIN CAUSED HER TO SLEEP WALK, ALONG WITH AMBIEN, LUNESTA, OR ANY OF THE SLEEP MEDS THAT COULD CAUSE HER TO SLEEP WALK. SCHED HER FOR A VIRTUAL WITH SHIRLEY Vaughan

## 2023-08-24 ENCOUNTER — TELEMEDICINE (OUTPATIENT)
Dept: FAMILY MEDICINE CLINIC | Age: 58
End: 2023-08-24

## 2023-08-24 DIAGNOSIS — F41.9 ANXIETY: Primary | ICD-10-CM

## 2023-08-24 PROCEDURE — 99213 OFFICE O/P EST LOW 20 MIN: CPT

## 2023-08-24 RX ORDER — PROPRANOLOL HYDROCHLORIDE 20 MG/1
20 TABLET ORAL 4 TIMES DAILY PRN
Qty: 90 TABLET | Refills: 3 | Status: SHIPPED | OUTPATIENT
Start: 2023-08-24

## 2023-08-24 ASSESSMENT — ENCOUNTER SYMPTOMS
PHOTOPHOBIA: 0
COUGH: 0
WHEEZING: 0
CHEST TIGHTNESS: 0
SHORTNESS OF BREATH: 0

## 2023-08-24 NOTE — PATIENT INSTRUCTIONS
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
## 2024-01-24 NOTE — LETTER
[address] [address]  Phone: [phone]  Fax: [phone]    Mandy Chávez MD        September 27, 2019     Patient: Jewel Aguilar   YOB: 1965   Date of Visit: 9/27/2019       To Whom It May Concern: It is my medical opinion that Jewel Given obese with multiple related comorbidities. She has been a patient at our office for just over one year. Her comorbid conditions include but are not limited to: diabetes in remission, hypercholesterolemia, chronic low back pain, arthritis, lumbar degenerative disk disease, possible sleep apnea (sleep study pending), severe acid reflux with hiatal hernia and shortness of breath. Her current height is 59.5 inches and weight is 196#, resulting in a BMI of 39.59. Her weigh 20 years ago was approximately 115#. In July of 2018, her weight was 185#. In January of 2019, her weight was 189#. She had some success with weight watchers in the past, but can not afford this presently. She has tried various diet/ exercise plans without significant results. Ms. Angelica Sandoval also suffers with depression related to her weight. I believe it would be in her best interest to have significant weight loss and I recommend gastric sleeve procedure. This would improve her overall health and potentially limit further health issues related to weight. Please cover this procedure. If you have any questions or concerns, please don't hesitate to call.     Sincerely,        Mandy Chávez MD Admitting MD

## 2024-04-01 DIAGNOSIS — E66.9 TYPE 2 DIABETES MELLITUS WITH OBESITY (HCC): ICD-10-CM

## 2024-04-01 DIAGNOSIS — E11.69 TYPE 2 DIABETES MELLITUS WITH OBESITY (HCC): ICD-10-CM

## 2024-04-01 DIAGNOSIS — M51.37 DEGENERATION OF LUMBOSACRAL INTERVERTEBRAL DISC: ICD-10-CM

## 2024-04-01 DIAGNOSIS — M47.816 LUMBAR SPONDYLOSIS: ICD-10-CM

## 2024-04-01 RX ORDER — PANTOPRAZOLE SODIUM 40 MG/1
TABLET, DELAYED RELEASE ORAL
Qty: 30 TABLET | Refills: 2 | Status: SHIPPED | OUTPATIENT
Start: 2024-04-01

## 2024-04-01 RX ORDER — BLOOD SUGAR DIAGNOSTIC
STRIP MISCELLANEOUS
Qty: 50 STRIP | Refills: 2 | Status: SHIPPED | OUTPATIENT
Start: 2024-04-01 | End: 2024-04-04

## 2024-04-01 RX ORDER — ALBUTEROL SULFATE 90 UG/1
AEROSOL, METERED RESPIRATORY (INHALATION)
Qty: 18 EACH | Refills: 2 | Status: SHIPPED | OUTPATIENT
Start: 2024-04-01

## 2024-04-01 RX ORDER — ONDANSETRON 4 MG/1
4 TABLET, ORALLY DISINTEGRATING ORAL EVERY 8 HOURS PRN
Qty: 20 TABLET | Refills: 2 | Status: SHIPPED | OUTPATIENT
Start: 2024-04-01

## 2024-04-01 RX ORDER — PREGABALIN 150 MG/1
CAPSULE ORAL
Qty: 60 CAPSULE | Refills: 2 | OUTPATIENT
Start: 2024-04-01 | End: 2024-04-01

## 2024-04-02 ENCOUNTER — TELEPHONE (OUTPATIENT)
Dept: ADMINISTRATIVE | Age: 59
End: 2024-04-02

## 2024-04-02 DIAGNOSIS — N95.1 MENOPAUSAL SYMPTOMS: ICD-10-CM

## 2024-04-02 DIAGNOSIS — R73.03 PREDIABETES: Primary | ICD-10-CM

## 2024-04-02 DIAGNOSIS — F60.9 PERSONALITY DISORDER (HCC): ICD-10-CM

## 2024-04-02 DIAGNOSIS — F31.9 BIPOLAR AFFECTIVE DISORDER, REMISSION STATUS UNSPECIFIED (HCC): ICD-10-CM

## 2024-04-02 NOTE — TELEPHONE ENCOUNTER
The RX on file for this METER/ STRIPS does not have a JANUSZ.  The pharmacies will keep running different  METERS/ STRIPS, until one is found that is on the formulary.  Please contact patient to confirm  A READER was received.    If this requires a response please respond to the pool ( P MHCX PSC MEDICATION PRE-AUTH).      Thank you please advise patient.
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
## 2024-04-04 DIAGNOSIS — E11.69 TYPE 2 DIABETES MELLITUS WITH OBESITY (HCC): Primary | ICD-10-CM

## 2024-04-04 DIAGNOSIS — E66.9 TYPE 2 DIABETES MELLITUS WITH OBESITY (HCC): Primary | ICD-10-CM

## 2024-04-04 RX ORDER — GLUCOSAMINE HCL/CHONDROITIN SU 500-400 MG
CAPSULE ORAL
Qty: 50 STRIP | Refills: 5 | Status: SHIPPED | OUTPATIENT
Start: 2024-04-04

## 2024-04-04 NOTE — TELEPHONE ENCOUNTER
SEEN BY ANNE IN AUGUST FOR THE ANXIETY AND LOSS OF SON, DO YOU NEED TO SEE HER OR CAN WE DO REFERRAL? SHE HAS ALWAYS TESTED HER SUGAR WE HAVE BEEN REFILLING HER TEST STRIPS AND METER SINCE SHE STARTED COMING HERE, HER INSURANCE CHANGED SO SHE HAS TO USE A DIFFERENT BRAND.KW

## 2024-04-05 DIAGNOSIS — M47.816 LUMBAR SPONDYLOSIS: ICD-10-CM

## 2024-04-05 DIAGNOSIS — M51.37 DEGENERATION OF LUMBOSACRAL INTERVERTEBRAL DISC: ICD-10-CM

## 2024-04-05 RX ORDER — PREGABALIN 150 MG/1
CAPSULE ORAL
Qty: 60 CAPSULE | Refills: 2 | OUTPATIENT
Start: 2024-04-05 | End: 2024-04-05

## 2024-04-05 NOTE — TELEPHONE ENCOUNTER
Not prescribed this medicine since 7/23.   Because of controlled status of drug, can not fill until evaluation to document use

## 2024-04-05 NOTE — TELEPHONE ENCOUNTER
Called and scheduled pt, can we send a 30 day supply until her appt? Pt states she does not use this often and does not need a refill urgently, but will most likely need it before her appt.

## 2024-04-05 NOTE — TELEPHONE ENCOUNTER
REQUESTING REFILL FOR PREGABALIN   LV 8/24/23 WITH [facility] FOR INSOMNIA NV NONE  PER ISAK PT NEEDS TO BE SEEN.    PLEASE CALL PT TO SCHEDULE APPT

## 2024-04-09 RX ORDER — ESTRADIOL/NORETHINDRONE ACETATE TRANSDERMAL SYSTEM .05; .25 MG/D; MG/D
1 PATCH, EXTENDED RELEASE TRANSDERMAL
Qty: 8 PATCH | Refills: 3 | Status: SHIPPED | OUTPATIENT
Start: 2024-04-11

## 2024-04-09 RX ORDER — ESTRADIOL/NORETHINDRONE ACETATE TRANSDERMAL SYSTEM .05; .25 MG/D; MG/D
PATCH, EXTENDED RELEASE TRANSDERMAL
Qty: 8 PATCH | Refills: 1 | Status: CANCELLED | OUTPATIENT
Start: 2024-04-09

## 2024-04-17 ENCOUNTER — TELEPHONE (OUTPATIENT)
Dept: FAMILY MEDICINE CLINIC | Age: 59
End: 2024-04-17

## 2024-04-17 RX ORDER — CYCLOBENZAPRINE HCL 10 MG
TABLET ORAL
Qty: 30 TABLET | Refills: 2 | Status: SHIPPED | OUTPATIENT
Start: 2024-04-17

## 2024-04-17 RX ORDER — CHOLECALCIFEROL (VITAMIN D3) 125 MCG
5 CAPSULE ORAL NIGHTLY
Qty: 30 TABLET | Refills: 5 | Status: SHIPPED | OUTPATIENT
Start: 2024-04-17

## 2024-05-08 ENCOUNTER — OFFICE VISIT (OUTPATIENT)
Dept: FAMILY MEDICINE CLINIC | Age: 59
End: 2024-05-08

## 2024-05-08 VITALS
SYSTOLIC BLOOD PRESSURE: 104 MMHG | BODY MASS INDEX: 39.59 KG/M2 | DIASTOLIC BLOOD PRESSURE: 70 MMHG | HEART RATE: 68 BPM | HEIGHT: 59 IN | OXYGEN SATURATION: 98 %

## 2024-05-08 DIAGNOSIS — Z12.11 COLON CANCER SCREENING: ICD-10-CM

## 2024-05-08 DIAGNOSIS — R73.9 HYPERGLYCEMIA: ICD-10-CM

## 2024-05-08 DIAGNOSIS — G47.00 INSOMNIA, UNSPECIFIED TYPE: ICD-10-CM

## 2024-05-08 DIAGNOSIS — E78.2 MIXED HYPERLIPIDEMIA: ICD-10-CM

## 2024-05-08 DIAGNOSIS — F41.9 ANXIETY: ICD-10-CM

## 2024-05-08 DIAGNOSIS — R23.2 HOT FLASHES: ICD-10-CM

## 2024-05-08 DIAGNOSIS — L28.0 NEURODERMATITIS: ICD-10-CM

## 2024-05-08 DIAGNOSIS — M51.37 DEGENERATION OF LUMBOSACRAL INTERVERTEBRAL DISC: ICD-10-CM

## 2024-05-08 DIAGNOSIS — E55.9 VITAMIN D DEFICIENCY: ICD-10-CM

## 2024-05-08 DIAGNOSIS — K21.9 CHRONIC GERD: ICD-10-CM

## 2024-05-08 DIAGNOSIS — F31.32 BIPOLAR DISORDER WITH MODERATE DEPRESSION (HCC): ICD-10-CM

## 2024-05-08 DIAGNOSIS — M47.816 LUMBAR SPONDYLOSIS: ICD-10-CM

## 2024-05-08 DIAGNOSIS — Z72.0 TOBACCO ABUSE: ICD-10-CM

## 2024-05-08 DIAGNOSIS — Z00.00 WELL ADULT EXAM: Primary | ICD-10-CM

## 2024-05-08 DIAGNOSIS — M79.601 RIGHT ARM PAIN: ICD-10-CM

## 2024-05-08 DIAGNOSIS — J41.0 SIMPLE CHRONIC BRONCHITIS (HCC): ICD-10-CM

## 2024-05-08 PROBLEM — I10 ESSENTIAL HYPERTENSION: Status: RESOLVED | Noted: 2018-08-23 | Resolved: 2024-05-08

## 2024-05-08 RX ORDER — TRIAMCINOLONE ACETONIDE 1 MG/G
CREAM TOPICAL
Qty: 30 G | Refills: 1 | Status: SHIPPED | OUTPATIENT
Start: 2024-05-08

## 2024-05-08 RX ORDER — ROSUVASTATIN CALCIUM 20 MG/1
20 TABLET, COATED ORAL NIGHTLY
Qty: 30 TABLET | Refills: 5 | Status: SHIPPED | OUTPATIENT
Start: 2024-05-08

## 2024-05-08 SDOH — ECONOMIC STABILITY: FOOD INSECURITY: WITHIN THE PAST 12 MONTHS, YOU WORRIED THAT YOUR FOOD WOULD RUN OUT BEFORE YOU GOT MONEY TO BUY MORE.: SOMETIMES TRUE

## 2024-05-08 SDOH — ECONOMIC STABILITY: FOOD INSECURITY: WITHIN THE PAST 12 MONTHS, THE FOOD YOU BOUGHT JUST DIDN'T LAST AND YOU DIDN'T HAVE MONEY TO GET MORE.: SOMETIMES TRUE

## 2024-05-08 SDOH — ECONOMIC STABILITY: INCOME INSECURITY: HOW HARD IS IT FOR YOU TO PAY FOR THE VERY BASICS LIKE FOOD, HOUSING, MEDICAL CARE, AND HEATING?: HARD

## 2024-05-08 ASSESSMENT — PATIENT HEALTH QUESTIONNAIRE - PHQ9
2. FEELING DOWN, DEPRESSED OR HOPELESS: NEARLY EVERY DAY
10. IF YOU CHECKED OFF ANY PROBLEMS, HOW DIFFICULT HAVE THESE PROBLEMS MADE IT FOR YOU TO DO YOUR WORK, TAKE CARE OF THINGS AT HOME, OR GET ALONG WITH OTHER PEOPLE: EXTREMELY DIFFICULT
1. LITTLE INTEREST OR PLEASURE IN DOING THINGS: NEARLY EVERY DAY
5. POOR APPETITE OR OVEREATING: NEARLY EVERY DAY
SUM OF ALL RESPONSES TO PHQ QUESTIONS 1-9: 27
4. FEELING TIRED OR HAVING LITTLE ENERGY: NEARLY EVERY DAY
8. MOVING OR SPEAKING SO SLOWLY THAT OTHER PEOPLE COULD HAVE NOTICED. OR THE OPPOSITE, BEING SO FIGETY OR RESTLESS THAT YOU HAVE BEEN MOVING AROUND A LOT MORE THAN USUAL: NEARLY EVERY DAY
SUM OF ALL RESPONSES TO PHQ QUESTIONS 1-9: 27
SUM OF ALL RESPONSES TO PHQ9 QUESTIONS 1 & 2: 6
3. TROUBLE FALLING OR STAYING ASLEEP: NEARLY EVERY DAY
SUM OF ALL RESPONSES TO PHQ QUESTIONS 1-9: 27
9. THOUGHTS THAT YOU WOULD BE BETTER OFF DEAD, OR OF HURTING YOURSELF: NEARLY EVERY DAY
SUM OF ALL RESPONSES TO PHQ QUESTIONS 1-9: 24
7. TROUBLE CONCENTRATING ON THINGS, SUCH AS READING THE NEWSPAPER OR WATCHING TELEVISION: NEARLY EVERY DAY
6. FEELING BAD ABOUT YOURSELF - OR THAT YOU ARE A FAILURE OR HAVE LET YOURSELF OR YOUR FAMILY DOWN: NEARLY EVERY DAY

## 2024-05-08 ASSESSMENT — COLUMBIA-SUICIDE SEVERITY RATING SCALE - C-SSRS
3. HAVE YOU BEEN THINKING ABOUT HOW YOU MIGHT KILL YOURSELF?: NO
5. HAVE YOU STARTED TO WORK OUT OR WORKED OUT THE DETAILS OF HOW TO KILL YOURSELF? DO YOU INTEND TO CARRY OUT THIS PLAN?: NO
6. HAVE YOU EVER DONE ANYTHING, STARTED TO DO ANYTHING, OR PREPARED TO DO ANYTHING TO END YOUR LIFE?: NO
4. HAVE YOU HAD THESE THOUGHTS AND HAD SOME INTENTION OF ACTING ON THEM?: NO
2. HAVE YOU ACTUALLY HAD ANY THOUGHTS OF KILLING YOURSELF?: YES
1. WITHIN THE PAST MONTH, HAVE YOU WISHED YOU WERE DEAD OR WISHED YOU COULD GO TO SLEEP AND NOT WAKE UP?: YES

## 2024-05-08 NOTE — PATIENT INSTRUCTIONS
http://[employer]/    [facility] [address] [facility], [address]  Phone: [phone]  Web: https://[employer]/housing-authority/[place]/Eyrqgxs-Dfqiaptcvvoe-Mtdjkdz-Authority/    [facility] [address] [place] [address]  Phone: [phone]  Web: https://EchoFirstUintah Basin Medical CenterWikiBrains/     .S. Department of Housing and Urban Development ([facility])  What they offer:  Public housing assistance  Website: https://www.[facility].gov/program_offices/public_indian_housing/pha/contacts  Additional Information: select your state from the website to find location specific contact information.                         [facility] Food Resources*  (Call 211 if need more resources.)          SendTask Indira   What they offer: Feeding Indira is a nationwide network of food watters, food pantries, and meal programs.  Website: https://www.byUs.com.org/find-your-local-foodBanner Rehabilitation Hospital West      National Hunger Hotline  What they offer: The hotline is a resource for individuals and families seeking information on how and where to obtain food.   Website:  https://www.hungerfreeamerica.org/en-us/[facility]-national-hunger-hotline    Hunger Hotline Phone Number: 7-469-6-HUNGRY (1-487.572.5866)  Hours of Operation: Monday - Friday 7am to 10pm   The Hunger Hotline also operates a texting service. Our number is 383-024-8488. Please note that these are automated texts and we do not reply or monitor texts.   CMP.LY [place]  What they offer: $1 for $1 matching for families receiving SNAP/food stamps when spent on “healthy” food.  The match money can be used to purchase fruits and vegetables so adds more healthy food choices for SNAP beneficiaries.   Contact: https://Patsnap/locations/   SNAP (formerly [employer])   What they offer: SNAP is used like cash to buy eligible food items from authorized retailers.  Apply for benefits online: https://jfs.[place].gov/ofam/foodstamps.stm
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
## 2024-05-16 ENCOUNTER — OFFICE VISIT (OUTPATIENT)
Dept: PSYCHIATRY | Age: 59
End: 2024-05-16
Payer: COMMERCIAL

## 2024-05-16 VITALS — BODY MASS INDEX: 39.59 KG/M2 | HEIGHT: 59 IN | SYSTOLIC BLOOD PRESSURE: 124 MMHG | DIASTOLIC BLOOD PRESSURE: 78 MMHG

## 2024-05-16 DIAGNOSIS — F41.9 ANXIETY: ICD-10-CM

## 2024-05-16 DIAGNOSIS — F43.10 PTSD (POST-TRAUMATIC STRESS DISORDER): Primary | ICD-10-CM

## 2024-05-16 PROCEDURE — G8428 CUR MEDS NOT DOCUMENT: HCPCS | Performed by: NURSE PRACTITIONER

## 2024-05-16 PROCEDURE — 99204 OFFICE O/P NEW MOD 45 MIN: CPT | Performed by: NURSE PRACTITIONER

## 2024-05-16 PROCEDURE — 3017F COLORECTAL CA SCREEN DOC REV: CPT | Performed by: NURSE PRACTITIONER

## 2024-05-16 PROCEDURE — G8419 CALC BMI OUT NRM PARAM NOF/U: HCPCS | Performed by: NURSE PRACTITIONER

## 2024-05-16 PROCEDURE — 4004F PT TOBACCO SCREEN RCVD TLK: CPT | Performed by: NURSE PRACTITIONER

## 2024-05-16 RX ORDER — LORAZEPAM 0.5 MG/1
0.5 TABLET ORAL 2 TIMES DAILY PRN
Qty: 60 TABLET | Refills: 0 | Status: SHIPPED | OUTPATIENT
Start: 2024-05-16 | End: 2024-06-15

## 2024-05-16 ASSESSMENT — ANXIETY QUESTIONNAIRES
2. NOT BEING ABLE TO STOP OR CONTROL WORRYING: 3-NEARLY EVERY DAY
4. TROUBLE RELAXING: 3-NEARLY EVERY DAY
3. WORRYING TOO MUCH ABOUT DIFFERENT THINGS: 3-NEARLY EVERY DAY
GAD7 TOTAL SCORE: 18
7. FEELING AFRAID AS IF SOMETHING AWFUL MIGHT HAPPEN: 3-NEARLY EVERY DAY
1. FEELING NERVOUS, ANXIOUS, OR ON EDGE: NEARLY EVERY DAY
5. BEING SO RESTLESS THAT IT IS HARD TO SIT STILL: 1-SEVERAL DAYS
6. BECOMING EASILY ANNOYED OR IRRITABLE: 2-OVER HALF THE DAYS

## 2024-05-16 ASSESSMENT — PATIENT HEALTH QUESTIONNAIRE - PHQ9
SUM OF ALL RESPONSES TO PHQ QUESTIONS 1-9: 21
1. LITTLE INTEREST OR PLEASURE IN DOING THINGS: NEARLY EVERY DAY
5. POOR APPETITE OR OVEREATING: NOT AT ALL
7. TROUBLE CONCENTRATING ON THINGS, SUCH AS READING THE NEWSPAPER OR WATCHING TELEVISION: NEARLY EVERY DAY
3. TROUBLE FALLING OR STAYING ASLEEP: NEARLY EVERY DAY
SUM OF ALL RESPONSES TO PHQ9 QUESTIONS 1 & 2: 6
9. THOUGHTS THAT YOU WOULD BE BETTER OFF DEAD, OR OF HURTING YOURSELF: NEARLY EVERY DAY
SUM OF ALL RESPONSES TO PHQ QUESTIONS 1-9: 18
8. MOVING OR SPEAKING SO SLOWLY THAT OTHER PEOPLE COULD HAVE NOTICED. OR THE OPPOSITE, BEING SO FIGETY OR RESTLESS THAT YOU HAVE BEEN MOVING AROUND A LOT MORE THAN USUAL: NOT AT ALL
6. FEELING BAD ABOUT YOURSELF - OR THAT YOU ARE A FAILURE OR HAVE LET YOURSELF OR YOUR FAMILY DOWN: NEARLY EVERY DAY
SUM OF ALL RESPONSES TO PHQ QUESTIONS 1-9: 21
SUM OF ALL RESPONSES TO PHQ QUESTIONS 1-9: 21
2. FEELING DOWN, DEPRESSED OR HOPELESS: NEARLY EVERY DAY
10. IF YOU CHECKED OFF ANY PROBLEMS, HOW DIFFICULT HAVE THESE PROBLEMS MADE IT FOR YOU TO DO YOUR WORK, TAKE CARE OF THINGS AT HOME, OR GET ALONG WITH OTHER PEOPLE: VERY DIFFICULT
4. FEELING TIRED OR HAVING LITTLE ENERGY: NEARLY EVERY DAY

## 2024-06-05 ENCOUNTER — TELEPHONE (OUTPATIENT)
Dept: FAMILY MEDICINE CLINIC | Age: 59
End: 2024-06-05

## 2024-06-05 NOTE — TELEPHONE ENCOUNTER
CALLED PT AND ADVISED WE ARE ABLE TO DO THIS ONE TIME, I LET HER KNOW WHEN WE RECEIVE THE FORM WE CAN FAX IT BACK.KEYANA ARANGO

## 2024-06-05 NOTE — TELEPHONE ENCOUNTER
Can do for 1 month based on her mental health concerns if able, but she is not on any electricity dependent equipment

## 2024-06-05 NOTE — TELEPHONE ENCOUNTER
Pt wants to speak to Fay.  She called [employer] and they are faxing over a form.    She is Out of town but her electric is scheduled to be turned off.    Please call

## 2024-06-14 DIAGNOSIS — F43.10 PTSD (POST-TRAUMATIC STRESS DISORDER): ICD-10-CM

## 2024-06-14 DIAGNOSIS — F41.9 ANXIETY: ICD-10-CM

## 2024-06-18 RX ORDER — LORAZEPAM 0.5 MG/1
0.5 TABLET ORAL 2 TIMES DAILY PRN
Qty: 60 TABLET | Refills: 2 | Status: SHIPPED | OUTPATIENT
Start: 2024-06-18 | End: 2024-09-16

## 2024-06-27 ENCOUNTER — COMMUNITY OUTREACH (OUTPATIENT)
Dept: FAMILY MEDICINE CLINIC | Age: 59
End: 2024-06-27

## 2024-06-27 NOTE — PROGRESS NOTES
Patient's HM shows they are overdue for Mammogram.   Kofax and  files searched without success.

## 2024-07-15 ENCOUNTER — TELEPHONE (OUTPATIENT)
Dept: FAMILY MEDICINE CLINIC | Age: 59
End: 2024-07-15

## 2024-08-08 ENCOUNTER — OFFICE VISIT (OUTPATIENT)
Dept: PSYCHIATRY | Age: 59
End: 2024-08-08
Payer: COMMERCIAL

## 2024-08-08 VITALS
WEIGHT: 176 LBS | HEIGHT: 59 IN | BODY MASS INDEX: 35.48 KG/M2 | SYSTOLIC BLOOD PRESSURE: 114 MMHG | DIASTOLIC BLOOD PRESSURE: 78 MMHG

## 2024-08-08 DIAGNOSIS — F43.10 PTSD (POST-TRAUMATIC STRESS DISORDER): ICD-10-CM

## 2024-08-08 DIAGNOSIS — F41.9 ANXIETY: ICD-10-CM

## 2024-08-08 DIAGNOSIS — F90.9 ATTENTION DEFICIT HYPERACTIVITY DISORDER (ADHD), UNSPECIFIED ADHD TYPE: Primary | ICD-10-CM

## 2024-08-08 PROCEDURE — G8427 DOCREV CUR MEDS BY ELIG CLIN: HCPCS | Performed by: NURSE PRACTITIONER

## 2024-08-08 PROCEDURE — G8417 CALC BMI ABV UP PARAM F/U: HCPCS | Performed by: NURSE PRACTITIONER

## 2024-08-08 PROCEDURE — 4004F PT TOBACCO SCREEN RCVD TLK: CPT | Performed by: NURSE PRACTITIONER

## 2024-08-08 PROCEDURE — 3017F COLORECTAL CA SCREEN DOC REV: CPT | Performed by: NURSE PRACTITIONER

## 2024-08-08 PROCEDURE — 99213 OFFICE O/P EST LOW 20 MIN: CPT | Performed by: NURSE PRACTITIONER

## 2024-08-08 RX ORDER — DEXTROAMPHETAMINE SACCHARATE, AMPHETAMINE ASPARTATE MONOHYDRATE, DEXTROAMPHETAMINE SULFATE AND AMPHETAMINE SULFATE 2.5; 2.5; 2.5; 2.5 MG/1; MG/1; MG/1; MG/1
10 CAPSULE, EXTENDED RELEASE ORAL DAILY
Qty: 14 CAPSULE | Refills: 0 | Status: SHIPPED | OUTPATIENT
Start: 2024-08-08 | End: 2024-08-22

## 2024-08-08 RX ORDER — LORAZEPAM 0.5 MG/1
0.5 TABLET ORAL 2 TIMES DAILY PRN
Qty: 60 TABLET | Refills: 2 | Status: SHIPPED | OUTPATIENT
Start: 2024-08-08 | End: 2024-11-06

## 2024-08-08 ASSESSMENT — PATIENT HEALTH QUESTIONNAIRE - PHQ9
SUM OF ALL RESPONSES TO PHQ9 QUESTIONS 1 & 2: 5
2. FEELING DOWN, DEPRESSED OR HOPELESS: MORE THAN HALF THE DAYS
SUM OF ALL RESPONSES TO PHQ QUESTIONS 1-9: 19
10. IF YOU CHECKED OFF ANY PROBLEMS, HOW DIFFICULT HAVE THESE PROBLEMS MADE IT FOR YOU TO DO YOUR WORK, TAKE CARE OF THINGS AT HOME, OR GET ALONG WITH OTHER PEOPLE: EXTREMELY DIFFICULT
1. LITTLE INTEREST OR PLEASURE IN DOING THINGS: NEARLY EVERY DAY
7. TROUBLE CONCENTRATING ON THINGS, SUCH AS READING THE NEWSPAPER OR WATCHING TELEVISION: NEARLY EVERY DAY
SUM OF ALL RESPONSES TO PHQ QUESTIONS 1-9: 19
SUM OF ALL RESPONSES TO PHQ QUESTIONS 1-9: 19
3. TROUBLE FALLING OR STAYING ASLEEP: SEVERAL DAYS
SUM OF ALL RESPONSES TO PHQ QUESTIONS 1-9: 16
6. FEELING BAD ABOUT YOURSELF - OR THAT YOU ARE A FAILURE OR HAVE LET YOURSELF OR YOUR FAMILY DOWN: MORE THAN HALF THE DAYS
9. THOUGHTS THAT YOU WOULD BE BETTER OFF DEAD, OR OF HURTING YOURSELF: NEARLY EVERY DAY
8. MOVING OR SPEAKING SO SLOWLY THAT OTHER PEOPLE COULD HAVE NOTICED. OR THE OPPOSITE, BEING SO FIGETY OR RESTLESS THAT YOU HAVE BEEN MOVING AROUND A LOT MORE THAN USUAL: SEVERAL DAYS
5. POOR APPETITE OR OVEREATING: MORE THAN HALF THE DAYS
4. FEELING TIRED OR HAVING LITTLE ENERGY: MORE THAN HALF THE DAYS

## 2024-08-08 ASSESSMENT — COLUMBIA-SUICIDE SEVERITY RATING SCALE - C-SSRS
2. HAVE YOU ACTUALLY HAD ANY THOUGHTS OF KILLING YOURSELF?: NO
6. HAVE YOU EVER DONE ANYTHING, STARTED TO DO ANYTHING, OR PREPARED TO DO ANYTHING TO END YOUR LIFE?: NO
1. WITHIN THE PAST MONTH, HAVE YOU WISHED YOU WERE DEAD OR WISHED YOU COULD GO TO SLEEP AND NOT WAKE UP?: YES

## 2024-08-08 ASSESSMENT — ANXIETY QUESTIONNAIRES
6. BECOMING EASILY ANNOYED OR IRRITABLE: NEARLY EVERY DAY
1. FEELING NERVOUS, ANXIOUS, OR ON EDGE: NEARLY EVERY DAY
IF YOU CHECKED OFF ANY PROBLEMS ON THIS QUESTIONNAIRE, HOW DIFFICULT HAVE THESE PROBLEMS MADE IT FOR YOU TO DO YOUR WORK, TAKE CARE OF THINGS AT HOME, OR GET ALONG WITH OTHER PEOPLE: EXTREMELY DIFFICULT
3. WORRYING TOO MUCH ABOUT DIFFERENT THINGS: NEARLY EVERY DAY
7. FEELING AFRAID AS IF SOMETHING AWFUL MIGHT HAPPEN: NEARLY EVERY DAY
5. BEING SO RESTLESS THAT IT IS HARD TO SIT STILL: MORE THAN HALF THE DAYS
4. TROUBLE RELAXING: NEARLY EVERY DAY
2. NOT BEING ABLE TO STOP OR CONTROL WORRYING: MORE THAN HALF THE DAYS
GAD7 TOTAL SCORE: 19

## 2024-08-08 NOTE — PROGRESS NOTES
PSYCHIATRY PROGRESS NOTE    Surekha Kilgore  1965  08/09/24    CC:   Chief Complaint   Patient presents with    Follow-up     Referred by Laith Ferreira MD.      Diagnosis Orders   1. Attention deficit hyperactivity disorder (ADHD), unspecified ADHD type  amphetamine-dextroamphetamine (ADDERALL XR) 10 MG extended release capsule      2. PTSD (post-traumatic stress disorder)  LORazepam (ATIVAN) 0.5 MG tablet      3. Anxiety  LORazepam (ATIVAN) 0.5 MG tablet            Assessment & Plan:     Psychiatric Assessment  Patient presentation today indicative of PTSD, Mood Disorder, Anxiety. Patient has a very longstanding hx of mental health disorders along with substance abuse. Currently sober for 5+ years. Patient reports increased anxiety with panic attacks lately that seem to be inducing depression. She has trialed many, many medications over the years that have induced side effects. Many medications inducing severe nausea. She is no longer drinking, does use medical MJ that has helped with her chronic back pain post surgery and has allowed her to complete more tasks. I am agreeable to restarting a low dose of ativan today for her anxiety. Will not be increasing. She will be taking a drug test at her 6 week visit for continuation. After 6 weeks, will pass back to PCP or pass to community provider. Patient reports improvement of anxiety with PRN medication. Concerns for ADHD today. She is displaying ADHD sx, has positive FH and screener today in office. Will attempt to treat.    2. Pharmacology     - Continue Ativan 0.5 mg BID PRN for anxiety. R/b/se discussed.  - Trial Adderall 10 mg XR once daily. R/b/se discussed. ADHD screener +. Positive FH. Positive symptoms.  - Lab reviewed  - UDS at next visit     3.   Psychotherapy  - Practice complementary health approaches such as: self-management strategies, relaxation techniques, yoga, and physical exercise as tolerated.  - Patient not participating in therapy at

## 2024-08-09 ENCOUNTER — TELEPHONE (OUTPATIENT)
Dept: FAMILY MEDICINE CLINIC | Age: 59
End: 2024-08-09

## 2024-08-09 RX ORDER — LIDOCAINE 40 MG/G
CREAM TOPICAL
Qty: 28 G | Refills: 1 | Status: SHIPPED | OUTPATIENT
Start: 2024-08-09

## 2024-08-21 ENCOUNTER — TELEPHONE (OUTPATIENT)
Dept: FAMILY MEDICINE CLINIC | Age: 59
End: 2024-08-21

## 2024-08-21 NOTE — TELEPHONE ENCOUNTER
CALLED AND SPOKE TO JESUS ALBERTO AT [facility]. THE COMBIPATCH IS ON BACKORDER AND NO DATE TO WHICH IT WILL BE BACK IN STOCK. HE STATES THERE IS REALLY NO ALTERNATIVE LIKE THIS. BUT HE STATES MAYBE IF YOU WANTED TO DO SEPARATE ESTROGEN PATCHES AND PROGESTERONE PILLS? SC

## 2024-08-21 NOTE — TELEPHONE ENCOUNTER
Patient was calling because her pharmacy is out of the patches for her hot flashes. They told her they sent us a alternate option. She said she really needs them.       [facility]   Phone number     Can we please give her a call

## 2024-08-21 NOTE — TELEPHONE ENCOUNTER
PT IS DUE FOR PAP, SHE IS OKAY WITH PATCHES AND PILLS IF WE CAN PLEASE SEND SOMETHING ASAP.KW  SENT HER FLICKS NUBMER.KW

## 2024-08-23 DIAGNOSIS — F90.9 ATTENTION DEFICIT HYPERACTIVITY DISORDER (ADHD), UNSPECIFIED ADHD TYPE: ICD-10-CM

## 2024-08-23 RX ORDER — ESTRADIOL AND NORETHINDRONE ACETATE .5; .1 MG/1; MG/1
1 TABLET ORAL DAILY
Qty: 28 TABLET | Refills: 1 | Status: SHIPPED | OUTPATIENT
Start: 2024-08-23 | End: 2024-09-20

## 2024-08-23 RX ORDER — DEXTROAMPHETAMINE SACCHARATE, AMPHETAMINE ASPARTATE MONOHYDRATE, DEXTROAMPHETAMINE SULFATE AND AMPHETAMINE SULFATE 2.5; 2.5; 2.5; 2.5 MG/1; MG/1; MG/1; MG/1
10 CAPSULE, EXTENDED RELEASE ORAL DAILY
Qty: 14 CAPSULE | Refills: 0 | Status: CANCELLED | OUTPATIENT
Start: 2024-08-23 | End: 2024-09-06

## 2024-08-23 NOTE — TELEPHONE ENCOUNTER
Patient was calling to get a refill on medication     Adderall 10MG, 1x daily, 30days     [facility]   Phone number

## 2024-08-23 NOTE — TELEPHONE ENCOUNTER
MEDICATION TABLET SENT TO PHARMACY - IF THEY DO NOT HAVE THIS SHE NEEDS TO CALL OTHER PHARMACIES TO SEE IF THEY HAVE THE COMBIPATCH

## 2024-08-26 DIAGNOSIS — F90.9 ATTENTION DEFICIT HYPERACTIVITY DISORDER (ADHD), UNSPECIFIED ADHD TYPE: ICD-10-CM

## 2024-08-26 RX ORDER — DEXTROAMPHETAMINE SACCHARATE, AMPHETAMINE ASPARTATE MONOHYDRATE, DEXTROAMPHETAMINE SULFATE AND AMPHETAMINE SULFATE 2.5; 2.5; 2.5; 2.5 MG/1; MG/1; MG/1; MG/1
10 CAPSULE, EXTENDED RELEASE ORAL DAILY
Qty: 14 CAPSULE | Refills: 0 | OUTPATIENT
Start: 2024-08-26 | End: 2024-09-09

## 2024-08-26 NOTE — TELEPHONE ENCOUNTER
Patient does not have the funds to keep running to the pharmacy, but she really needs to pickup this medication tonight. Please give her a call  back.

## 2024-08-27 DIAGNOSIS — F90.9 ATTENTION DEFICIT HYPERACTIVITY DISORDER (ADHD), UNSPECIFIED ADHD TYPE: ICD-10-CM

## 2024-08-27 RX ORDER — DEXTROAMPHETAMINE SACCHARATE, AMPHETAMINE ASPARTATE MONOHYDRATE, DEXTROAMPHETAMINE SULFATE AND AMPHETAMINE SULFATE 2.5; 2.5; 2.5; 2.5 MG/1; MG/1; MG/1; MG/1
10 CAPSULE, EXTENDED RELEASE ORAL DAILY
Qty: 30 CAPSULE | Refills: 0 | Status: SHIPPED | OUTPATIENT
Start: 2024-08-27 | End: 2024-09-26

## 2024-08-27 NOTE — TELEPHONE ENCOUNTER
Theresa is out of the office on Monday's. Refill request has been sent to [facility]. Patient should call pharmacy to verify they have it before driving there.
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
## 2024-08-27 NOTE — PROGRESS NOTES
Sober for 2 years- hx of alcoholism    Drug use: Yes     Types: Marijuana     Comment: 3-4 x week        PHYSICAL EXAMINATION:  [ INSTRUCTIONS:  \"[x]\" Indicates a positive item  \"[]\" Indicates a negative item  -- DELETE ALL ITEMS NOT EXAMINED]  Vital Signs: (As obtained by patient/caregiver or practitioner observation)    Blood pressure-  Heart rate-    Respiratory rate-    Temperature-  Pulse oximetry-     Constitutional: [x] Appears well-developed and well-nourished [x] No apparent distress      [] Abnormal-   Mental status  [x] Alert and awake  [x] Oriented to person/place/time [x]Able to follow commands      Eyes:  EOM    [x]  Normal  [] Abnormal-  Sclera  [x]  Normal  [] Abnormal -         Discharge [x]  None visible  [] Abnormal -    HENT:   [x] Normocephalic, atraumatic. [] Abnormal   [] Mouth/Throat: Mucous membranes are moist.     External Ears [] Normal  [] Abnormal-     Neck: [x] No visualized mass     Pulmonary/Chest: [] Respiratory effort normal.  [] No visualized signs of difficulty breathing or respiratory distress        [] Abnormal-      Musculoskeletal:   [] Normal gait with no signs of ataxia         [] Normal range of motion of neck        [] Abnormal-       Neurological:        [x] No Facial Asymmetry (Cranial nerve 7 motor function) (limited exam to video visit)          [] No gaze palsy        [] Abnormal-         Skin:        [x] No significant exanthematous lesions or discoloration noted on facial skin         [] Abnormal-            Psychiatric:       [x] Normal Affect [] No Hallucinations        [] Abnormal-     Other pertinent observable physical exam findings-     ASSESSMENT/PLAN:  There are no diagnoses linked to this encounter. Diagnosis Orders   1. Adjustment disorder with mixed anxiety and depressed mood  diazePAM (VALIUM) 5 MG tablet   consider wellbutrin/ cymbalta increase or addition of buspar or abilify for mood - pt does not want to adjust dose presently.   Asks for something short-acting to use as needed  oarrs reviewed and results c/w rx'd meds  Dw/ pt use of benzos as short-term/ as needed basis only. Denies using etoh or opiates presently and dw/ pt serious interaction between benzos and these classes of drugs  Will give #20 5mg valium to use bid prn severe anxiety as effecting sleep - call prior to refills  F/u prn    No follow-ups on file. John Gan is a 48 y.o. female being evaluated by a Virtual Visit (video visit) encounter to address concerns as mentioned above. A caregiver was present when appropriate. Due to this being a TeleHealth encounter (During RKGIJ-23 public health emergency), evaluation of the following organ systems was limited: Vitals/Constitutional/EENT/Resp/CV/GI//MS/Neuro/Skin/Heme-Lymph-Imm. Pursuant to the emergency declaration under the [address] [place] [address] authority and the [employer] Act, this Virtual Visit was conducted with patient's (and/or legal guardian's) consent, to reduce the patient's risk of exposure to COVID-19 and provide necessary medical care. The patient (and/or legal guardian) has also been advised to contact this office for worsening conditions or problems, and seek emergency medical treatment and/or call 911 if deemed necessary. Patient identification was verified at the start of the visit: Yes    Total time spent on this encounter: Not billed by time    Services were provided through a video synchronous discussion virtually to substitute for in-person clinic visit. Patient and provider were located at their individual homes. --Susi Doe MD on 5/18/2020 at 3:50 PM    An electronic signature was used to authenticate this note. [School Readiness] : school readiness [Healthy Personal Habits] : healthy personal habits [TV/Media] : tv/media [Child and Family Involvement] : child and family involvement [Safety] : safety [FreeTextEntry1] : Continue balanced diet with all food groups. Brush teeth twice a day with toothbrush. Recommend visit to dentist. As per car seat 's guidelines, use forward-facing booster seat until child reaches highest weight/height for seat. Child needs to ride in a belt-positioning booster seat until  4 feet 9 inches has been reached and are between 8 and 12 years of age.  Put child to sleep in own bed. Help child to maintain consistent daily routines and sleep schedule. Pre-K discussed. Ensure home is safe. Teach child about personal safety. Use consistent, positive discipline. Read aloud to child. Limit screen time to no more than 2 hours per day MMRV given Epipen renewed

## 2024-08-28 DIAGNOSIS — M51.37 DEGENERATION OF LUMBOSACRAL INTERVERTEBRAL DISC: Primary | ICD-10-CM

## 2024-08-28 RX ORDER — LORATADINE 10 MG/1
10 TABLET ORAL DAILY
Qty: 30 TABLET | Refills: 5 | Status: SHIPPED | OUTPATIENT
Start: 2024-08-28

## 2024-08-28 RX ORDER — EPINEPHRINE 0.3 MG/.3ML
INJECTION SUBCUTANEOUS
Qty: 2 EACH | Refills: 1 | Status: SHIPPED | OUTPATIENT
Start: 2024-08-28

## 2024-09-04 ENCOUNTER — TELEPHONE (OUTPATIENT)
Dept: FAMILY MEDICINE CLINIC | Age: 59
End: 2024-09-04

## 2024-09-04 NOTE — TELEPHONE ENCOUNTER
LVM TO CLARIFY WITH PT  AS TO WHETHER OR NOT SHE WAS ABLE TO GET HER ADDERALL.  IF NOT WHICH PHARMACY TO SEND IT TO

## 2024-09-05 ENCOUNTER — TELEPHONE (OUTPATIENT)
Dept: FAMILY MEDICINE CLINIC | Age: 59
End: 2024-09-05

## 2024-09-05 DIAGNOSIS — J41.0 SIMPLE CHRONIC BRONCHITIS (HCC): Primary | ICD-10-CM

## 2024-09-05 DIAGNOSIS — F90.9 ATTENTION DEFICIT HYPERACTIVITY DISORDER (ADHD), UNSPECIFIED ADHD TYPE: ICD-10-CM

## 2024-09-05 RX ORDER — DEXTROAMPHETAMINE SACCHARATE, AMPHETAMINE ASPARTATE MONOHYDRATE, DEXTROAMPHETAMINE SULFATE AND AMPHETAMINE SULFATE 2.5; 2.5; 2.5; 2.5 MG/1; MG/1; MG/1; MG/1
10 CAPSULE, EXTENDED RELEASE ORAL DAILY
Qty: 30 CAPSULE | Refills: 0 | Status: SHIPPED | OUTPATIENT
Start: 2024-09-05 | End: 2024-10-05

## 2024-09-05 RX ORDER — IPRATROPIUM BROMIDE AND ALBUTEROL SULFATE 2.5; .5 MG/3ML; MG/3ML
1 SOLUTION RESPIRATORY (INHALATION) EVERY 4 HOURS
Qty: 360 ML | Refills: 0 | Status: SHIPPED | OUTPATIENT
Start: 2024-09-05

## 2024-09-05 NOTE — TELEPHONE ENCOUNTER
Patient called back and said the answer was still no. She is not very happy.    The pharmacy she would like this sent to [facility]   Phone number

## 2024-09-05 NOTE — TELEPHONE ENCOUNTER
[facility]

## 2024-09-05 NOTE — TELEPHONE ENCOUNTER
Patient was asking about an inhaler that she had prescribed a couple of years ago. She has been trying to get it but [facility] does not pay for that one anymore     Combivent but she has used different ones since then.  Can we get her something similar    [facility]   Phone Number

## 2024-09-05 NOTE — TELEPHONE ENCOUNTER
Previous send and Bevespi is similar to this for inhaler.  I will send DuoNeb nebulizer solution at the same medication with different delivery

## 2024-09-06 RX ORDER — NEBULIZER ACCESSORIES
1 KIT MISCELLANEOUS DAILY PRN
Qty: 1 KIT | Refills: 0 | Status: SHIPPED | OUTPATIENT
Start: 2024-09-06

## 2024-09-06 NOTE — TELEPHONE ENCOUNTER
Called and spoke to patient to advise on inhaler and nebulizer solution sent. Patient states she does not have a nebulizer machine can she get one? Sc

## 2024-09-06 NOTE — TELEPHONE ENCOUNTER
Called to inform pt, she said [facility] will not fill and she needs this to go to a medical supply company. Are you okay if we give her a neb from the office? I believe her insurance is covered.

## 2024-09-19 RX ORDER — ALBUTEROL SULFATE 90 UG/1
INHALANT RESPIRATORY (INHALATION)
Qty: 18 EACH | Refills: 2 | Status: SHIPPED | OUTPATIENT
Start: 2024-09-19

## 2024-10-01 DIAGNOSIS — F90.9 ATTENTION DEFICIT HYPERACTIVITY DISORDER (ADHD), UNSPECIFIED ADHD TYPE: ICD-10-CM

## 2024-10-02 RX ORDER — DEXTROAMPHETAMINE SACCHARATE, AMPHETAMINE ASPARTATE MONOHYDRATE, DEXTROAMPHETAMINE SULFATE AND AMPHETAMINE SULFATE 2.5; 2.5; 2.5; 2.5 MG/1; MG/1; MG/1; MG/1
10 CAPSULE, EXTENDED RELEASE ORAL DAILY
Qty: 30 CAPSULE | Refills: 0 | Status: SHIPPED | OUTPATIENT
Start: 2024-10-07 | End: 2024-10-04

## 2024-10-04 ENCOUNTER — TELEPHONE (OUTPATIENT)
Dept: FAMILY MEDICINE CLINIC | Age: 59
End: 2024-10-04

## 2024-10-04 DIAGNOSIS — F90.9 ATTENTION DEFICIT HYPERACTIVITY DISORDER (ADHD), UNSPECIFIED ADHD TYPE: ICD-10-CM

## 2024-10-04 RX ORDER — DEXTROAMPHETAMINE SACCHARATE, AMPHETAMINE ASPARTATE MONOHYDRATE, DEXTROAMPHETAMINE SULFATE AND AMPHETAMINE SULFATE 2.5; 2.5; 2.5; 2.5 MG/1; MG/1; MG/1; MG/1
10 CAPSULE, EXTENDED RELEASE ORAL DAILY
Qty: 30 CAPSULE | Refills: 0 | Status: SHIPPED | OUTPATIENT
Start: 2024-10-04 | End: 2024-10-04 | Stop reason: SDUPTHER

## 2024-10-04 RX ORDER — DEXTROAMPHETAMINE SACCHARATE, AMPHETAMINE ASPARTATE MONOHYDRATE, DEXTROAMPHETAMINE SULFATE AND AMPHETAMINE SULFATE 2.5; 2.5; 2.5; 2.5 MG/1; MG/1; MG/1; MG/1
10 CAPSULE, EXTENDED RELEASE ORAL DAILY
Qty: 30 CAPSULE | Refills: 0 | Status: SHIPPED | OUTPATIENT
Start: 2024-10-04 | End: 2024-11-03

## 2024-10-04 NOTE — TELEPHONE ENCOUNTER
Pt is calling to say her Adderall XR 10 mg was sent to be filled Oct 7.  But she said she filled this Sept. 5, 2023.  So it should be due Oct 5.23.    Can we call [facility] [phone]   and have them fill this tomorrow.    Any questions [phone]  Surekha

## 2024-10-04 NOTE — TELEPHONE ENCOUNTER
Patient said needs refill sent to [facility] on [address].  [facility] is out of it.

## 2024-10-07 DIAGNOSIS — J41.0 SIMPLE CHRONIC BRONCHITIS (HCC): ICD-10-CM

## 2024-10-07 RX ORDER — PANTOPRAZOLE SODIUM 40 MG/1
TABLET, DELAYED RELEASE ORAL
Qty: 30 TABLET | Refills: 2 | Status: SHIPPED | OUTPATIENT
Start: 2024-10-07

## 2024-10-07 RX ORDER — IPRATROPIUM BROMIDE AND ALBUTEROL SULFATE 2.5; .5 MG/3ML; MG/3ML
1 SOLUTION RESPIRATORY (INHALATION) EVERY 4 HOURS
Qty: 360 ML | Refills: 0 | Status: SHIPPED | OUTPATIENT
Start: 2024-10-07

## 2024-10-15 ENCOUNTER — TELEPHONE (OUTPATIENT)
Dept: FAMILY MEDICINE CLINIC | Age: 59
End: 2024-10-15
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
## 2024-10-15 RX ORDER — METHOCARBAMOL 750 MG/1
750 TABLET, FILM COATED ORAL 3 TIMES DAILY
Qty: 30 TABLET | Refills: 0 | Status: SHIPPED | OUTPATIENT
Start: 2024-10-15 | End: 2024-10-17
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
## 2024-10-15 NOTE — TELEPHONE ENCOUNTER
Called pt and informed, pt requested if we could send her pain medication. Okay to send her something until she can see LG?

## 2024-10-15 NOTE — TELEPHONE ENCOUNTER
Can be virtual at that appointment with me, or she can change to virtual with someone else tomorrow if she wants
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
## 2024-10-15 NOTE — TELEPHONE ENCOUNTER
Patient advised half of the message before she said she was with a customer and could not continue the call.  I told patient Jonaie's recommendations can be viewed on her my-chart.  Patient states she has probably already tried whatever it is.

## 2024-10-17 ENCOUNTER — TELEMEDICINE (OUTPATIENT)
Dept: FAMILY MEDICINE CLINIC | Age: 59
End: 2024-10-17
Payer: COMMERCIAL

## 2024-10-17 ENCOUNTER — HOSPITAL ENCOUNTER (OUTPATIENT)
Dept: MAMMOGRAPHY | Age: 59
Discharge: HOME | End: 2024-10-22

## 2024-10-17 DIAGNOSIS — M54.32 CHRONIC SCIATICA OF LEFT SIDE: ICD-10-CM

## 2024-10-17 DIAGNOSIS — Z12.31 VISIT FOR SCREENING MAMMOGRAM: ICD-10-CM

## 2024-10-17 DIAGNOSIS — F07.81 POST CONCUSSIVE SYNDROME: Primary | ICD-10-CM

## 2024-10-17 PROCEDURE — G8427 DOCREV CUR MEDS BY ELIG CLIN: HCPCS

## 2024-10-17 PROCEDURE — 4004F PT TOBACCO SCREEN RCVD TLK: CPT

## 2024-10-17 PROCEDURE — G8417 CALC BMI ABV UP PARAM F/U: HCPCS

## 2024-10-17 PROCEDURE — 3017F COLORECTAL CA SCREEN DOC REV: CPT

## 2024-10-17 PROCEDURE — 99213 OFFICE O/P EST LOW 20 MIN: CPT

## 2024-10-17 PROCEDURE — G8484 FLU IMMUNIZE NO ADMIN: HCPCS

## 2024-10-17 ASSESSMENT — ENCOUNTER SYMPTOMS
SHORTNESS OF BREATH: 0
COLOR CHANGE: 0
COUGH: 0
CHEST TIGHTNESS: 0
BACK PAIN: 1

## 2024-10-17 NOTE — PROGRESS NOTES
Surekha Kilgore, was evaluated through a synchronous (real-time) audio-video encounter. The patient (or guardian if applicable) is aware that this is a billable service, which includes applicable co-pays. This Virtual Visit was conducted with patient's (and/or legal guardian's) consent. Patient identification was verified, and a caregiver was present when appropriate.   The patient was located at Home: [address] [place], [place] [address]  Provider was located at Facility (Appt Dept): [address] [place] [address]  Confirm you are appropriately licensed, registered, or certified to deliver care in the state where the patient is located as indicated above. If you are not or unsure, please re-schedule the visit: Yes, I confirm.     Surekha Kilgore (:  1965) is a Established patient, presenting virtually for evaluation of the following:      Below is the assessment and plan developed based on review of pertinent history, physical exam, labs, studies, and medications.     Assessment & Plan  Post concussive syndrome   Acute condition, new, appears to be improving as this is no longer the patient's complaint  -CT reports mild cortical atrophy, patient is concerned  -Referral placed.  Information provided to the patient.  Educated it is their responsibility to follow-up on this.  The patient verbalized understanding.  -Follow-up with neurology  Orders:    Jean Paul Patel MD, Neurology, [facility]    Chronic sciatica of left side   Chronic, not at goal (unstable), acute flare of chronic sciatica  -Patient reports \"stockpile of Lyrica, prednisone, muscle relaxers \"at home, has been Vicodin from neighbor  -Continue medical marijuana  -Has not seen her neurosurgeon recently  -Encouraged her to follow-up with neurosurgery  -Declines Toradol prescription  -Can continue home prednisone, 2 mg twice daily X 7 days, 20 mg daily X 7 days  -Can consider PT if no improvement  Orders:    Windy Han

## 2024-10-31 DIAGNOSIS — F90.9 ATTENTION DEFICIT HYPERACTIVITY DISORDER (ADHD), UNSPECIFIED ADHD TYPE: ICD-10-CM

## 2024-10-31 RX ORDER — DEXTROAMPHETAMINE SACCHARATE, AMPHETAMINE ASPARTATE MONOHYDRATE, DEXTROAMPHETAMINE SULFATE AND AMPHETAMINE SULFATE 2.5; 2.5; 2.5; 2.5 MG/1; MG/1; MG/1; MG/1
10 CAPSULE, EXTENDED RELEASE ORAL DAILY
Qty: 30 CAPSULE | Refills: 0 | Status: CANCELLED | OUTPATIENT
Start: 2024-10-31 | End: 2024-11-30

## 2024-11-04 ENCOUNTER — TELEPHONE (OUTPATIENT)
Dept: FAMILY MEDICINE CLINIC | Age: 59
End: 2024-11-04

## 2024-11-04 DIAGNOSIS — F90.9 ATTENTION DEFICIT HYPERACTIVITY DISORDER (ADHD), UNSPECIFIED ADHD TYPE: ICD-10-CM

## 2024-11-04 RX ORDER — DEXTROAMPHETAMINE SACCHARATE, AMPHETAMINE ASPARTATE MONOHYDRATE, DEXTROAMPHETAMINE SULFATE AND AMPHETAMINE SULFATE 2.5; 2.5; 2.5; 2.5 MG/1; MG/1; MG/1; MG/1
10 CAPSULE, EXTENDED RELEASE ORAL DAILY
Qty: 30 CAPSULE | Refills: 0 | Status: SHIPPED | OUTPATIENT
Start: 2024-11-04 | End: 2024-12-04

## 2024-11-04 RX ORDER — DEXTROAMPHETAMINE SACCHARATE, AMPHETAMINE ASPARTATE MONOHYDRATE, DEXTROAMPHETAMINE SULFATE AND AMPHETAMINE SULFATE 2.5; 2.5; 2.5; 2.5 MG/1; MG/1; MG/1; MG/1
10 CAPSULE, EXTENDED RELEASE ORAL DAILY
Qty: 30 CAPSULE | Refills: 0 | Status: SHIPPED | OUTPATIENT
Start: 2024-11-04 | End: 2024-11-04 | Stop reason: SDUPTHER

## 2024-11-04 NOTE — TELEPHONE ENCOUNTER
Patient is calling because she needs a refill on her medication ADDERALL XR 10 MG EXTENDED RELEASE CAPSULE - 1 CAPSULE PER DAY .     [facility]  - phone no. [phone]    She said she sent a request thru my chart last week but has not heard anything.    Please give her a call back.

## 2024-11-04 NOTE — TELEPHONE ENCOUNTER
Patient called again about her refill for her adderall, it was sent to [facility] and she needs it to be sent to:    [facility]ar[facility]  Phone no. [phone]    Please send this to the correct pharmacy patient is very upset that it has been sent to [place].

## 2024-11-04 NOTE — TELEPHONE ENCOUNTER
Patient needs a 3 month appointment. I will refill one month and needs to schedule before she gets another fill.

## 2024-11-06 DIAGNOSIS — J41.0 SIMPLE CHRONIC BRONCHITIS (HCC): ICD-10-CM

## 2024-11-06 RX ORDER — IPRATROPIUM BROMIDE AND ALBUTEROL SULFATE 2.5; .5 MG/3ML; MG/3ML
1 SOLUTION RESPIRATORY (INHALATION) EVERY 4 HOURS
Qty: 360 ML | Refills: 0 | Status: SHIPPED | OUTPATIENT
Start: 2024-11-06

## 2024-12-02 DIAGNOSIS — F90.9 ATTENTION DEFICIT HYPERACTIVITY DISORDER (ADHD), UNSPECIFIED ADHD TYPE: ICD-10-CM

## 2024-12-02 DIAGNOSIS — F41.9 ANXIETY: Primary | ICD-10-CM

## 2024-12-02 RX ORDER — DEXTROAMPHETAMINE SACCHARATE, AMPHETAMINE ASPARTATE MONOHYDRATE, DEXTROAMPHETAMINE SULFATE AND AMPHETAMINE SULFATE 2.5; 2.5; 2.5; 2.5 MG/1; MG/1; MG/1; MG/1
10 CAPSULE, EXTENDED RELEASE ORAL DAILY
Qty: 30 CAPSULE | Refills: 0 | Status: SHIPPED | OUTPATIENT
Start: 2024-12-04 | End: 2025-01-03

## 2024-12-02 RX ORDER — LORAZEPAM 0.5 MG/1
0.5 TABLET ORAL 2 TIMES DAILY PRN
Qty: 60 TABLET | Refills: 2 | Status: SHIPPED | OUTPATIENT
Start: 2024-12-04 | End: 2025-03-04

## 2024-12-04 RX ORDER — ESTRADIOL/NORETHINDRONE ACETATE TRANSDERMAL SYSTEM .05; .25 MG/D; MG/D
PATCH, EXTENDED RELEASE TRANSDERMAL
Qty: 8 PATCH | Refills: 3 | Status: SHIPPED | OUTPATIENT
Start: 2024-12-04

## 2024-12-17 ENCOUNTER — TELEPHONE (OUTPATIENT)
Dept: FAMILY MEDICINE CLINIC | Age: 59
End: 2024-12-17

## 2024-12-17 DIAGNOSIS — E55.9 VITAMIN D DEFICIENCY: ICD-10-CM

## 2024-12-17 DIAGNOSIS — M51.379 DEGENERATION OF LUMBOSACRAL INTERVERTEBRAL DISC: ICD-10-CM

## 2024-12-17 RX ORDER — IBUPROFEN AND FAMOTIDINE 26.6; 8 MG/1; MG/1
TABLET ORAL
Qty: 90 TABLET | Refills: 0 | Status: SHIPPED | OUTPATIENT
Start: 2024-12-17

## 2024-12-17 RX ORDER — LORATADINE 10 MG/1
10 TABLET ORAL DAILY
Qty: 30 TABLET | Refills: 5 | Status: SHIPPED | OUTPATIENT
Start: 2024-12-17

## 2024-12-17 RX ORDER — EPINEPHRINE 0.3 MG/.3ML
INJECTION SUBCUTANEOUS
Qty: 2 EACH | Refills: 1 | Status: SHIPPED | OUTPATIENT
Start: 2024-12-17

## 2024-12-17 RX ORDER — ERGOCALCIFEROL 1.25 MG/1
50000 CAPSULE, LIQUID FILLED ORAL WEEKLY
Qty: 12 CAPSULE | Refills: 0 | Status: SHIPPED | OUTPATIENT
Start: 2024-12-17

## 2024-12-17 RX ORDER — LIDOCAINE 40 MG/G
CREAM TOPICAL
Qty: 28 G | Refills: 1 | Status: SHIPPED | OUTPATIENT
Start: 2024-12-17

## 2025-01-02 DIAGNOSIS — F90.9 ATTENTION DEFICIT HYPERACTIVITY DISORDER (ADHD), UNSPECIFIED ADHD TYPE: ICD-10-CM

## 2025-01-02 DIAGNOSIS — F41.9 ANXIETY: ICD-10-CM

## 2025-01-02 RX ORDER — DEXTROAMPHETAMINE SACCHARATE, AMPHETAMINE ASPARTATE MONOHYDRATE, DEXTROAMPHETAMINE SULFATE AND AMPHETAMINE SULFATE 2.5; 2.5; 2.5; 2.5 MG/1; MG/1; MG/1; MG/1
10 CAPSULE, EXTENDED RELEASE ORAL DAILY
Qty: 30 CAPSULE | Refills: 0 | Status: SHIPPED | OUTPATIENT
Start: 2025-01-02 | End: 2025-02-01

## 2025-01-02 RX ORDER — LORAZEPAM 0.5 MG/1
0.5 TABLET ORAL 2 TIMES DAILY PRN
Qty: 60 TABLET | Refills: 2 | Status: SHIPPED | OUTPATIENT
Start: 2025-01-02 | End: 2025-04-02

## 2025-01-16 ENCOUNTER — SCHEDULED TELEPHONE ENCOUNTER (OUTPATIENT)
Dept: PSYCHIATRY | Age: 60
End: 2025-01-16

## 2025-01-16 DIAGNOSIS — F90.9 ATTENTION DEFICIT HYPERACTIVITY DISORDER (ADHD), UNSPECIFIED ADHD TYPE: Primary | ICD-10-CM

## 2025-01-16 ASSESSMENT — ANXIETY QUESTIONNAIRES
1. FEELING NERVOUS, ANXIOUS, OR ON EDGE: NEARLY EVERY DAY
GAD7 TOTAL SCORE: 17
IF YOU CHECKED OFF ANY PROBLEMS ON THIS QUESTIONNAIRE, HOW DIFFICULT HAVE THESE PROBLEMS MADE IT FOR YOU TO DO YOUR WORK, TAKE CARE OF THINGS AT HOME, OR GET ALONG WITH OTHER PEOPLE: EXTREMELY DIFFICULT
6. BECOMING EASILY ANNOYED OR IRRITABLE: NEARLY EVERY DAY
3. WORRYING TOO MUCH ABOUT DIFFERENT THINGS: MORE THAN HALF THE DAYS
2. NOT BEING ABLE TO STOP OR CONTROL WORRYING: MORE THAN HALF THE DAYS
4. TROUBLE RELAXING: MORE THAN HALF THE DAYS
5. BEING SO RESTLESS THAT IT IS HARD TO SIT STILL: MORE THAN HALF THE DAYS
7. FEELING AFRAID AS IF SOMETHING AWFUL MIGHT HAPPEN: NEARLY EVERY DAY

## 2025-01-16 ASSESSMENT — PATIENT HEALTH QUESTIONNAIRE - PHQ9
9. THOUGHTS THAT YOU WOULD BE BETTER OFF DEAD, OR OF HURTING YOURSELF: SEVERAL DAYS
6. FEELING BAD ABOUT YOURSELF - OR THAT YOU ARE A FAILURE OR HAVE LET YOURSELF OR YOUR FAMILY DOWN: MORE THAN HALF THE DAYS
2. FEELING DOWN, DEPRESSED OR HOPELESS: MORE THAN HALF THE DAYS
SUM OF ALL RESPONSES TO PHQ QUESTIONS 1-9: 13
1. LITTLE INTEREST OR PLEASURE IN DOING THINGS: MORE THAN HALF THE DAYS
SUM OF ALL RESPONSES TO PHQ9 QUESTIONS 1 & 2: 4
SUM OF ALL RESPONSES TO PHQ QUESTIONS 1-9: 13
7. TROUBLE CONCENTRATING ON THINGS, SUCH AS READING THE NEWSPAPER OR WATCHING TELEVISION: MORE THAN HALF THE DAYS
8. MOVING OR SPEAKING SO SLOWLY THAT OTHER PEOPLE COULD HAVE NOTICED. OR THE OPPOSITE, BEING SO FIGETY OR RESTLESS THAT YOU HAVE BEEN MOVING AROUND A LOT MORE THAN USUAL: SEVERAL DAYS
4. FEELING TIRED OR HAVING LITTLE ENERGY: SEVERAL DAYS
SUM OF ALL RESPONSES TO PHQ QUESTIONS 1-9: 12
SUM OF ALL RESPONSES TO PHQ QUESTIONS 1-9: 13
3. TROUBLE FALLING OR STAYING ASLEEP: SEVERAL DAYS
5. POOR APPETITE OR OVEREATING: SEVERAL DAYS
10. IF YOU CHECKED OFF ANY PROBLEMS, HOW DIFFICULT HAVE THESE PROBLEMS MADE IT FOR YOU TO DO YOUR WORK, TAKE CARE OF THINGS AT HOME, OR GET ALONG WITH OTHER PEOPLE: VERY DIFFICULT

## 2025-01-16 ASSESSMENT — COLUMBIA-SUICIDE SEVERITY RATING SCALE - C-SSRS
2. HAVE YOU ACTUALLY HAD ANY THOUGHTS OF KILLING YOURSELF?: NO
1. WITHIN THE PAST MONTH, HAVE YOU WISHED YOU WERE DEAD OR WISHED YOU COULD GO TO SLEEP AND NOT WAKE UP?: NO
6. HAVE YOU EVER DONE ANYTHING, STARTED TO DO ANYTHING, OR PREPARED TO DO ANYTHING TO END YOUR LIFE?: NO

## 2025-01-17 ENCOUNTER — TELEPHONE (OUTPATIENT)
Dept: FAMILY MEDICINE CLINIC | Age: 60
End: 2025-01-17

## 2025-01-17 RX ORDER — DEXTROAMPHETAMINE SACCHARATE, AMPHETAMINE ASPARTATE, DEXTROAMPHETAMINE SULFATE AND AMPHETAMINE SULFATE 2.5; 2.5; 2.5; 2.5 MG/1; MG/1; MG/1; MG/1
10 TABLET ORAL 2 TIMES DAILY
Qty: 60 TABLET | Refills: 0 | Status: SHIPPED | OUTPATIENT
Start: 2025-01-17 | End: 2025-02-16

## 2025-01-17 NOTE — PROGRESS NOTES
PSYCHIATRY FOLLOW UP EVALUATION      Surekha Kilgore  1965  01/17/25  Referred by Glischinski, Luke A, APRN - CNP.     CC:   Chief Complaint   Patient presents with    Follow-up        Diagnosis Orders   1. Attention deficit hyperactivity disorder (ADHD), unspecified ADHD type  amphetamine-dextroamphetamine (ADDERALL, 10MG,) 10 MG tablet          Assessment & Plan:      Psychiatric Assessment  Patient presentation today indicative of PTSD, Mood Disorder, Anxiety. Patient has a very longstanding hx of mental health disorders along with substance abuse. Currently sober for 5+ years. Patient reports increased anxiety with panic attacks lately that seem to be inducing depression. She has trialed many, many medications over the years that have induced side effects. Many medications inducing severe nausea. She is no longer drinking, does use medical MJ that has helped with her chronic back pain post surgery and has allowed her to complete more tasks. I am agreeable to restarting a low dose of ativan today for her anxiety. Will not be increasing. She will be taking a drug test at her 6 week visit for continuation. After 6 weeks, will pass back to PCP or pass to community provider. Patient reports improvement of anxiety with PRN medication. Concerns for ADHD today. She is displaying ADHD sx, has positive FH and screener today in office. Will attempt to treat.     2. Pharmacology     - Continue Ativan 0.5 mg BID PRN for anxiety. R/b/se discussed. Patient has been prescribed a Benzodiazepine. Discussed with patient risks, benefits, side effects & adverse events of medication, including but not limited to PMR, sedation, amnesia, ataxia, dependence, and withdrawal as well as interactions with alcohol/CNS depressants, operating heavy machinery/vehicles and weight change, appetite change, dry mouth, irritability, fatigue (Alprazolam). Also discussed with time to response, drug interactions, and stopping the medication.

## 2025-01-28 ENCOUNTER — PATIENT MESSAGE (OUTPATIENT)
Dept: FAMILY MEDICINE CLINIC | Age: 60
End: 2025-01-28

## 2025-01-28 DIAGNOSIS — M54.32 CHRONIC SCIATICA OF LEFT SIDE: ICD-10-CM

## 2025-01-28 DIAGNOSIS — M51.372 DEGENERATION OF INTERVERTEBRAL DISC OF LUMBOSACRAL REGION WITH DISCOGENIC BACK PAIN AND LOWER EXTREMITY PAIN: Primary | ICD-10-CM

## 2025-01-30 RX ORDER — LIDOCAINE 50 MG/G
1 PATCH TOPICAL DAILY
Qty: 30 PATCH | Refills: 0 | Status: SHIPPED | OUTPATIENT
Start: 2025-01-30 | End: 2025-03-18

## 2025-01-30 RX ORDER — IBUPROFEN AND FAMOTIDINE 26.6; 8 MG/1; MG/1
TABLET ORAL
Qty: 90 TABLET | Refills: 1 | Status: SHIPPED | OUTPATIENT
Start: 2025-01-30 | End: 2025-02-10

## 2025-02-03 ENCOUNTER — TELEPHONE (OUTPATIENT)
Dept: ADMINISTRATIVE | Age: 60
End: 2025-02-03

## 2025-02-03 DIAGNOSIS — M54.32 CHRONIC SCIATICA OF LEFT SIDE: ICD-10-CM

## 2025-02-03 DIAGNOSIS — M47.26 OSTEOARTHRITIS OF SPINE WITH RADICULOPATHY, LUMBAR REGION: Primary | ICD-10-CM

## 2025-02-03 DIAGNOSIS — M51.372 DEGENERATION OF INTERVERTEBRAL DISC OF LUMBOSACRAL REGION WITH DISCOGENIC BACK PAIN AND LOWER EXTREMITY PAIN: ICD-10-CM

## 2025-02-03 NOTE — TELEPHONE ENCOUNTER
Submitted PA for Ibuprofen-Famotidine 800-26.6MG tablets   Via CM Key: [id number]  STATUS: PENDING.    Follow up done daily; if no decision with in three days we will refax.  If another three days goes by with no decision will call the insurance for status.

## 2025-02-04 NOTE — TELEPHONE ENCOUNTER
Denied on February 3 by [place] [employer] 2017  Coverage is provided when the member has a history of at least 30 days of therapy with at least two preferred medications, which include but are not limited to: celecoxib, ibuprofen, and meloxicam tablets. The [facility] Policy for Medical Necessity as posted on the [facility] website and [facility] Preferred Drug List criteria were reviewed and per [place] Administrative Code Rule 5160-1-01 (C) and 5160-26-03 (B), a medically necessary service must include: generally accepted standards of medical practice, be clinically appropriate in administration, treatment and outcome and be the lowest cost alternative to effectively treat the condition. Please contact your provider to assist you with other treatment options that might be covered under your benefit package, or other services that might be available through the community.

## 2025-02-04 NOTE — TELEPHONE ENCOUNTER
Coded prophy not covered.  Insurance defers regular Profen, meloxicam, or celecoxib.  If she did not  ibuprofen due to price, I can send 1 of these other options

## 2025-02-04 NOTE — TELEPHONE ENCOUNTER
CALLED AND SPOKE TO PATIENT. SHE CANNOT TAKE REGULAR IBUPROFEN DUE TO AN ULCER. SHE SAID MELOXICAM CAUSED HER SOME TROUBLE, BUT SHE COULD NOT REMEMBER WHAT HAPPENED WITH IT, AND SHE SAID THE CELEBREX MAKES HER LEGS SWELL. SC

## 2025-02-04 NOTE — TELEPHONE ENCOUNTER
Please send information to our authorization department, she cannot take the medication preferred by her insurance

## 2025-02-05 NOTE — TELEPHONE ENCOUNTER
Submitted PA for Ibuprofen-Famotidine 800-26.6MG tablets    Via CM Key: [id number] STATUS: PENDING.    Follow up done daily; if no decision with in three days we will refax.  If another three days goes by with no decision will call the insurance for status.     yes

## 2025-02-07 NOTE — TELEPHONE ENCOUNTER
The medication was DENIED; DENIAL letter is uploaded to MED[employer].    Generic Denial:  Other; please see Denial Letter.           Note :  Coverage is provided when the prescribed medication is used for a Food and Drug Administration (FDA) approved indication, age and/or dose. THE INFORMATION CONTAINED IN THE REMAINING PART OF THIS SECTION IS INTENDED FOR YOUR PROVIDER AND GIVES A MORE DETAILED DESCRIPTION FOR WHY THIS REQUEST WAS NOT APPROVED. Requests for off-label uses of a drug will be considered for approval when all the following criteria is met: 1. The drug is approved by the FDA for use in the [place]; and 2. Documentation must be submitted by the prescriber showing the member has tried and failed the existing FDA approved and/or clinical guideline recommended therapies unless contraindicated or not tolerated; and 3. The prescribed use must be supported by the following: a. Lexicomp: Evidence level A or G; or b. Evidence from at least two published studies from major scientific or medical peer reviewed journals demonstrates safety and efficacy for the specified condition in a comparable population (for example: age group, level of disease severity, etc.) must be submitted by the prescriber. Observational studies will not be considered for this requirement. ii. If applicable clinical trial is yet to be published but interim results are supportive, this must be submitted by the prescriber and may be taken into consideration by the clinician reviewer. The [facility] Policy for Medical Necessity as posted on the [facility] website was reviewed and per [place] Administrative Code Rule 5160-1-01 (C) and 5160-26-03 (B), a medically necessary service must include: generally accepted standards of medical practice, be clinically appropriate in administration, treatment and outcome and be the lowest cost alternative to effectively treat the condition. Please contact your provider to assist you with other treatment options

## 2025-02-07 NOTE — TELEPHONE ENCOUNTER
CALLED PT AND ADVISED SHE JUST LOST HER JOB CANNOT AFFORD MEDICATION AT ALL RIGHT NOW WONDERING IF WE CAN DO A PEER TO PEER? I LET HER KNOW WE WILL FOLLOW UP WITH HER ON MONDAY?

## 2025-02-10 DIAGNOSIS — M47.812 OSTEOARTHRITIS OF CERVICAL AND LUMBAR SPINE: Primary | ICD-10-CM

## 2025-02-10 DIAGNOSIS — M51.372 DEGENERATION OF INTERVERTEBRAL DISC OF LUMBOSACRAL REGION WITH DISCOGENIC BACK PAIN AND LOWER EXTREMITY PAIN: ICD-10-CM

## 2025-02-10 DIAGNOSIS — M54.32 CHRONIC SCIATICA OF LEFT SIDE: ICD-10-CM

## 2025-02-10 DIAGNOSIS — M47.816 OSTEOARTHRITIS OF CERVICAL AND LUMBAR SPINE: Primary | ICD-10-CM

## 2025-02-10 RX ORDER — FAMOTIDINE 20 MG/1
20 TABLET, FILM COATED ORAL 3 TIMES DAILY PRN
Qty: 180 TABLET | Refills: 1 | Status: SHIPPED | OUTPATIENT
Start: 2025-02-10 | End: 2025-02-10

## 2025-02-10 RX ORDER — IBUPROFEN 800 MG/1
800 TABLET, FILM COATED ORAL
Qty: 180 TABLET | Refills: 1 | Status: SHIPPED | OUTPATIENT
Start: 2025-02-10 | End: 2025-02-10

## 2025-02-10 RX ORDER — IBUPROFEN AND FAMOTIDINE 26.6; 8 MG/1; MG/1
TABLET ORAL
Qty: 90 TABLET | Refills: 1 | Status: SHIPPED | OUTPATIENT
Start: 2025-02-10 | End: 2025-02-10

## 2025-02-10 RX ORDER — IBUPROFEN AND FAMOTIDINE 26.6; 8 MG/1; MG/1
TABLET ORAL
Qty: 90 TABLET | Refills: 1 | Status: SHIPPED | OUTPATIENT
Start: 2025-02-10

## 2025-02-10 NOTE — TELEPHONE ENCOUNTER
CALLED AND SPOKE TO PATIENT AND ADVISED ON ANNE MESSAGE. PATIENT STATES SHE DOES NOT THINK THE MEDS WILL WORK THE SAME SEPARATE SINCE THE COATING WILL NOT BE ON THERE? I AM UNSURE WHAT SHE MEANS? IS THERE ANYTHING WE CAN DO TO HELP? SC

## 2025-02-10 NOTE — TELEPHONE ENCOUNTER
I will send ibuprofen and famotidine as separate prescriptions, recommend patient take these together

## 2025-02-10 NOTE — TELEPHONE ENCOUNTER
In regard to denial, this is not off label use for this medication.  Resent as osteoarthritis of spine, this may work for coverage.  Ultimately financials are up to her insurance

## 2025-03-10 RX ORDER — ALBUTEROL SULFATE 90 UG/1
INHALANT RESPIRATORY (INHALATION)
Qty: 18 EACH | Refills: 2 | Status: SHIPPED | OUTPATIENT
Start: 2025-03-10

## 2025-03-17 DIAGNOSIS — M54.32 CHRONIC SCIATICA OF LEFT SIDE: ICD-10-CM

## 2025-03-17 DIAGNOSIS — M51.372 DEGENERATION OF INTERVERTEBRAL DISC OF LUMBOSACRAL REGION WITH DISCOGENIC BACK PAIN AND LOWER EXTREMITY PAIN: ICD-10-CM

## 2025-03-18 RX ORDER — LIDOCAINE 50 MG/G
PATCH TOPICAL
Qty: 30 PATCH | Refills: 0 | Status: SHIPPED | OUTPATIENT
Start: 2025-03-18

## 2025-04-23 ENCOUNTER — SCHEDULED TELEPHONE ENCOUNTER (OUTPATIENT)
Dept: PSYCHIATRY | Age: 60
End: 2025-04-23
Payer: COMMERCIAL

## 2025-04-23 DIAGNOSIS — F43.10 PTSD (POST-TRAUMATIC STRESS DISORDER): ICD-10-CM

## 2025-04-23 DIAGNOSIS — F41.9 ANXIETY: ICD-10-CM

## 2025-04-23 DIAGNOSIS — F90.9 ATTENTION DEFICIT HYPERACTIVITY DISORDER (ADHD), UNSPECIFIED ADHD TYPE: Primary | ICD-10-CM

## 2025-04-23 PROCEDURE — 98967 PH1 ASSMT&MGMT NQHP 11-20: CPT | Performed by: NURSE PRACTITIONER

## 2025-04-23 RX ORDER — LORAZEPAM 0.5 MG/1
0.5 TABLET ORAL 2 TIMES DAILY PRN
Qty: 60 TABLET | Refills: 0 | Status: SHIPPED | OUTPATIENT
Start: 2025-04-23 | End: 2025-05-23

## 2025-04-23 RX ORDER — DEXTROAMPHETAMINE SACCHARATE, AMPHETAMINE ASPARTATE MONOHYDRATE, DEXTROAMPHETAMINE SULFATE AND AMPHETAMINE SULFATE 3.75; 3.75; 3.75; 3.75 MG/1; MG/1; MG/1; MG/1
15 CAPSULE, EXTENDED RELEASE ORAL DAILY
Qty: 30 CAPSULE | Refills: 0 | Status: SHIPPED | OUTPATIENT
Start: 2025-04-23 | End: 2025-05-23

## 2025-04-23 ASSESSMENT — PATIENT HEALTH QUESTIONNAIRE - PHQ9
2. FEELING DOWN, DEPRESSED OR HOPELESS: SEVERAL DAYS
10. IF YOU CHECKED OFF ANY PROBLEMS, HOW DIFFICULT HAVE THESE PROBLEMS MADE IT FOR YOU TO DO YOUR WORK, TAKE CARE OF THINGS AT HOME, OR GET ALONG WITH OTHER PEOPLE: SOMEWHAT DIFFICULT
6. FEELING BAD ABOUT YOURSELF - OR THAT YOU ARE A FAILURE OR HAVE LET YOURSELF OR YOUR FAMILY DOWN: MORE THAN HALF THE DAYS
7. TROUBLE CONCENTRATING ON THINGS, SUCH AS READING THE NEWSPAPER OR WATCHING TELEVISION: SEVERAL DAYS
SUM OF ALL RESPONSES TO PHQ QUESTIONS 1-9: 10
5. POOR APPETITE OR OVEREATING: SEVERAL DAYS
3. TROUBLE FALLING OR STAYING ASLEEP: MORE THAN HALF THE DAYS
SUM OF ALL RESPONSES TO PHQ QUESTIONS 1-9: 10
8. MOVING OR SPEAKING SO SLOWLY THAT OTHER PEOPLE COULD HAVE NOTICED. OR THE OPPOSITE, BEING SO FIGETY OR RESTLESS THAT YOU HAVE BEEN MOVING AROUND A LOT MORE THAN USUAL: NOT AT ALL
4. FEELING TIRED OR HAVING LITTLE ENERGY: MORE THAN HALF THE DAYS
SUM OF ALL RESPONSES TO PHQ QUESTIONS 1-9: 10
9. THOUGHTS THAT YOU WOULD BE BETTER OFF DEAD, OR OF HURTING YOURSELF: NOT AT ALL
SUM OF ALL RESPONSES TO PHQ QUESTIONS 1-9: 10
1. LITTLE INTEREST OR PLEASURE IN DOING THINGS: SEVERAL DAYS

## 2025-04-23 ASSESSMENT — ANXIETY QUESTIONNAIRES
GAD7 TOTAL SCORE: 14
3. WORRYING TOO MUCH ABOUT DIFFERENT THINGS: MORE THAN HALF THE DAYS
6. BECOMING EASILY ANNOYED OR IRRITABLE: NEARLY EVERY DAY
2. NOT BEING ABLE TO STOP OR CONTROL WORRYING: MORE THAN HALF THE DAYS
5. BEING SO RESTLESS THAT IT IS HARD TO SIT STILL: MORE THAN HALF THE DAYS
7. FEELING AFRAID AS IF SOMETHING AWFUL MIGHT HAPPEN: SEVERAL DAYS
IF YOU CHECKED OFF ANY PROBLEMS ON THIS QUESTIONNAIRE, HOW DIFFICULT HAVE THESE PROBLEMS MADE IT FOR YOU TO DO YOUR WORK, TAKE CARE OF THINGS AT HOME, OR GET ALONG WITH OTHER PEOPLE: VERY DIFFICULT
1. FEELING NERVOUS, ANXIOUS, OR ON EDGE: MORE THAN HALF THE DAYS
4. TROUBLE RELAXING: MORE THAN HALF THE DAYS

## 2025-04-23 NOTE — PROGRESS NOTES
PSYCHIATRY FOLLOW UP EVALUATION      Surekha Kilgore  1965  04/23/25  Referred by Glischinski, Luke A, APRN - CNP.     CC:   Chief Complaint   Patient presents with    Follow-up        Diagnosis Orders   1. Attention deficit hyperactivity disorder (ADHD), unspecified ADHD type  Comprehensive Metabolic Panel    CBC    TSH    Hepatic Function Panel    LIPID PANEL    amphetamine-dextroamphetamine (ADDERALL XR) 15 MG extended release capsule      2. PTSD (post-traumatic stress disorder)  Comprehensive Metabolic Panel    CBC    TSH    Hepatic Function Panel    LIPID PANEL    amphetamine-dextroamphetamine (ADDERALL XR) 15 MG extended release capsule    LORazepam (ATIVAN) 0.5 MG tablet      3. Anxiety  Comprehensive Metabolic Panel    CBC    TSH    Hepatic Function Panel    LIPID PANEL    LORazepam (ATIVAN) 0.5 MG tablet          Assessment & Plan:      Psychiatric Assessment  Patient presentation today indicative of PTSD, Mood Disorder, Anxiety. Patient has a very longstanding hx of mental health disorders along with substance abuse. Currently sober for 5+ years. Patient reports increased anxiety with panic attacks lately that seem to be inducing depression. She has trialed many, many medications over the years that have induced side effects. Many medications inducing severe nausea. She is no longer drinking, does use medical MJ that has helped with her chronic back pain post surgery and has allowed her to complete more tasks. I am agreeable to restarting a low dose of ativan today for her anxiety. Will not be increasing. She will be taking a drug test at her 6 week visit for continuation. After 6 weeks, will pass back to PCP or pass to community provider. Patient reports improvement of anxiety with PRN medication. Concerns for ADHD today. She is displaying ADHD sx, has positive FH and screener today in office. Will attempt to treat.     2. Pharmacology     - Continue Ativan 0.5 mg BID PRN for anxiety. R/b/se

## 2025-05-08 DIAGNOSIS — E55.9 VITAMIN D DEFICIENCY: ICD-10-CM

## 2025-05-08 RX ORDER — PANTOPRAZOLE SODIUM 40 MG/1
40 TABLET, DELAYED RELEASE ORAL
Qty: 30 TABLET | Refills: 2 | OUTPATIENT
Start: 2025-05-08

## 2025-05-08 RX ORDER — ERGOCALCIFEROL 1.25 MG/1
50000 CAPSULE, LIQUID FILLED ORAL WEEKLY
Qty: 12 CAPSULE | Refills: 0 | OUTPATIENT
Start: 2025-05-08

## 2025-05-09 RX ORDER — PANTOPRAZOLE SODIUM 40 MG/1
TABLET, DELAYED RELEASE ORAL
Qty: 30 TABLET | Refills: 0 | Status: SHIPPED | OUTPATIENT
Start: 2025-05-09

## 2025-05-09 RX ORDER — ERGOCALCIFEROL 1.25 MG/1
50000 CAPSULE, LIQUID FILLED ORAL WEEKLY
Qty: 12 CAPSULE | Refills: 0 | Status: SHIPPED | OUTPATIENT
Start: 2025-05-09

## 2025-05-09 RX ORDER — ESTRADIOL/NORETHINDRONE ACETATE TRANSDERMAL SYSTEM .05; .25 MG/D; MG/D
PATCH, EXTENDED RELEASE TRANSDERMAL
Qty: 8 PATCH | Refills: 3 | Status: SHIPPED | OUTPATIENT
Start: 2025-05-09

## 2025-05-23 DIAGNOSIS — F90.9 ATTENTION DEFICIT HYPERACTIVITY DISORDER (ADHD), UNSPECIFIED ADHD TYPE: ICD-10-CM

## 2025-05-23 DIAGNOSIS — F41.9 ANXIETY: ICD-10-CM

## 2025-05-23 DIAGNOSIS — F43.10 PTSD (POST-TRAUMATIC STRESS DISORDER): ICD-10-CM

## 2025-05-23 RX ORDER — DEXTROAMPHETAMINE SACCHARATE, AMPHETAMINE ASPARTATE MONOHYDRATE, DEXTROAMPHETAMINE SULFATE AND AMPHETAMINE SULFATE 3.75; 3.75; 3.75; 3.75 MG/1; MG/1; MG/1; MG/1
15 CAPSULE, EXTENDED RELEASE ORAL DAILY
Qty: 30 CAPSULE | Refills: 0 | Status: SHIPPED | OUTPATIENT
Start: 2025-05-23 | End: 2025-06-22

## 2025-05-23 RX ORDER — LORAZEPAM 0.5 MG/1
0.5 TABLET ORAL 2 TIMES DAILY PRN
Qty: 60 TABLET | Refills: 0 | Status: SHIPPED | OUTPATIENT
Start: 2025-05-23 | End: 2025-06-22

## 2025-06-12 RX ORDER — PANTOPRAZOLE SODIUM 40 MG/1
40 TABLET, DELAYED RELEASE ORAL
Qty: 30 TABLET | Refills: 0 | Status: SHIPPED | OUTPATIENT
Start: 2025-06-12

## 2025-06-26 DIAGNOSIS — F43.10 PTSD (POST-TRAUMATIC STRESS DISORDER): ICD-10-CM

## 2025-06-26 DIAGNOSIS — F90.9 ATTENTION DEFICIT HYPERACTIVITY DISORDER (ADHD), UNSPECIFIED ADHD TYPE: ICD-10-CM

## 2025-06-26 RX ORDER — DEXTROAMPHETAMINE SACCHARATE, AMPHETAMINE ASPARTATE MONOHYDRATE, DEXTROAMPHETAMINE SULFATE AND AMPHETAMINE SULFATE 3.75; 3.75; 3.75; 3.75 MG/1; MG/1; MG/1; MG/1
15 CAPSULE, EXTENDED RELEASE ORAL DAILY
Qty: 30 CAPSULE | Refills: 0 | OUTPATIENT
Start: 2025-06-26 | End: 2025-07-26

## 2025-06-27 ENCOUNTER — TELEPHONE (OUTPATIENT)
Dept: FAMILY MEDICINE CLINIC | Age: 60
End: 2025-06-27

## 2025-06-27 DIAGNOSIS — F41.9 ANXIETY: ICD-10-CM

## 2025-06-27 DIAGNOSIS — F43.10 PTSD (POST-TRAUMATIC STRESS DISORDER): ICD-10-CM

## 2025-06-27 DIAGNOSIS — F90.9 ATTENTION DEFICIT HYPERACTIVITY DISORDER (ADHD), UNSPECIFIED ADHD TYPE: ICD-10-CM

## 2025-06-27 RX ORDER — LORAZEPAM 0.5 MG/1
0.5 TABLET ORAL 2 TIMES DAILY PRN
Qty: 60 TABLET | Refills: 0 | Status: SHIPPED | OUTPATIENT
Start: 2025-06-27 | End: 2025-07-27

## 2025-06-27 RX ORDER — DEXTROAMPHETAMINE SACCHARATE, AMPHETAMINE ASPARTATE MONOHYDRATE, DEXTROAMPHETAMINE SULFATE AND AMPHETAMINE SULFATE 3.75; 3.75; 3.75; 3.75 MG/1; MG/1; MG/1; MG/1
15 CAPSULE, EXTENDED RELEASE ORAL DAILY
Qty: 30 CAPSULE | Refills: 0 | Status: SHIPPED | OUTPATIENT
Start: 2025-06-27 | End: 2025-07-27

## 2025-06-27 NOTE — TELEPHONE ENCOUNTER
We cross cover when Theresa is out.  Given that he is up-to-date with follow-up with her and also up-to-date in our office, refill was sent on separate encounter.

## 2025-06-27 NOTE — TELEPHONE ENCOUNTER
Patient needs a refill on her medication and Theresa is on vacation and Jennie told the patient to call her PCP     ADDERALL 15 MG EXTENDED RELEASE - 1 TABLET PER DAY - 30 DAY SUPPLY    LORAZEPAM 0.5 MG TABLET - 2 TABLET PER DAY AND SHE NEEDS A 30 DAY SUPPLY.    [facility]  Phone no.[phone]    She needs this refill before she goes to work day at 3 pm.    Please give her a call back.

## 2025-08-05 DIAGNOSIS — E55.9 VITAMIN D DEFICIENCY: ICD-10-CM

## 2025-08-05 RX ORDER — ERGOCALCIFEROL 1.25 MG/1
50000 CAPSULE, LIQUID FILLED ORAL WEEKLY
Qty: 12 CAPSULE | Refills: 0 | Status: SHIPPED | OUTPATIENT
Start: 2025-08-05

## 2025-08-12 ENCOUNTER — TELEPHONE (OUTPATIENT)
Dept: FAMILY MEDICINE CLINIC | Age: 60
End: 2025-08-12

## 2025-08-21 ENCOUNTER — OFFICE VISIT (OUTPATIENT)
Dept: FAMILY MEDICINE CLINIC | Age: 60
End: 2025-08-21
Payer: COMMERCIAL

## 2025-08-21 VITALS
WEIGHT: 177 LBS | HEIGHT: 59 IN | SYSTOLIC BLOOD PRESSURE: 122 MMHG | BODY MASS INDEX: 35.68 KG/M2 | DIASTOLIC BLOOD PRESSURE: 80 MMHG | OXYGEN SATURATION: 97 % | HEART RATE: 68 BPM

## 2025-08-21 DIAGNOSIS — F43.10 PTSD (POST-TRAUMATIC STRESS DISORDER): ICD-10-CM

## 2025-08-21 DIAGNOSIS — F90.9 ATTENTION DEFICIT HYPERACTIVITY DISORDER (ADHD), UNSPECIFIED ADHD TYPE: ICD-10-CM

## 2025-08-21 DIAGNOSIS — F41.9 ANXIETY: Primary | ICD-10-CM

## 2025-08-21 PROCEDURE — 99214 OFFICE O/P EST MOD 30 MIN: CPT

## 2025-08-21 PROCEDURE — G8427 DOCREV CUR MEDS BY ELIG CLIN: HCPCS

## 2025-08-21 PROCEDURE — 4004F PT TOBACCO SCREEN RCVD TLK: CPT

## 2025-08-21 PROCEDURE — 3017F COLORECTAL CA SCREEN DOC REV: CPT

## 2025-08-21 PROCEDURE — G8417 CALC BMI ABV UP PARAM F/U: HCPCS

## 2025-08-21 RX ORDER — ONDANSETRON 4 MG/1
4 TABLET, ORALLY DISINTEGRATING ORAL EVERY 8 HOURS PRN
Qty: 30 TABLET | Refills: 2 | Status: SHIPPED | OUTPATIENT
Start: 2025-08-21

## 2025-08-21 RX ORDER — QUETIAPINE FUMARATE 25 MG/1
25 TABLET, FILM COATED ORAL NIGHTLY
Qty: 30 TABLET | Refills: 2 | Status: SHIPPED | OUTPATIENT
Start: 2025-08-21 | End: 2025-08-21

## 2025-08-21 RX ORDER — DEXTROAMPHETAMINE SACCHARATE, AMPHETAMINE ASPARTATE MONOHYDRATE, DEXTROAMPHETAMINE SULFATE AND AMPHETAMINE SULFATE 3.75; 3.75; 3.75; 3.75 MG/1; MG/1; MG/1; MG/1
15 CAPSULE, EXTENDED RELEASE ORAL DAILY
Qty: 7 CAPSULE | Refills: 0 | Status: SHIPPED | OUTPATIENT
Start: 2025-08-21 | End: 2025-08-28

## 2025-08-21 RX ORDER — ZIPRASIDONE HYDROCHLORIDE 20 MG/1
20 CAPSULE ORAL 2 TIMES DAILY WITH MEALS
Qty: 60 CAPSULE | Refills: 2 | Status: SHIPPED | OUTPATIENT
Start: 2025-08-21

## 2025-08-21 RX ORDER — LORAZEPAM 0.5 MG/1
0.5 TABLET ORAL EVERY 12 HOURS PRN
Qty: 14 TABLET | Refills: 0 | Status: SHIPPED | OUTPATIENT
Start: 2025-08-21 | End: 2025-08-28

## 2025-08-21 SDOH — ECONOMIC STABILITY: FOOD INSECURITY: WITHIN THE PAST 12 MONTHS, THE FOOD YOU BOUGHT JUST DIDN'T LAST AND YOU DIDN'T HAVE MONEY TO GET MORE.: NEVER TRUE

## 2025-08-21 SDOH — ECONOMIC STABILITY: FOOD INSECURITY: WITHIN THE PAST 12 MONTHS, YOU WORRIED THAT YOUR FOOD WOULD RUN OUT BEFORE YOU GOT MONEY TO BUY MORE.: NEVER TRUE

## 2025-08-28 ENCOUNTER — OFFICE VISIT (OUTPATIENT)
Dept: PSYCHIATRY | Age: 60
End: 2025-08-28
Payer: COMMERCIAL

## 2025-08-28 VITALS
WEIGHT: 178 LBS | SYSTOLIC BLOOD PRESSURE: 118 MMHG | HEIGHT: 59 IN | BODY MASS INDEX: 35.88 KG/M2 | DIASTOLIC BLOOD PRESSURE: 78 MMHG

## 2025-08-28 DIAGNOSIS — F90.9 ATTENTION DEFICIT HYPERACTIVITY DISORDER (ADHD), UNSPECIFIED ADHD TYPE: ICD-10-CM

## 2025-08-28 DIAGNOSIS — F41.9 ANXIETY: ICD-10-CM

## 2025-08-28 PROCEDURE — 4004F PT TOBACCO SCREEN RCVD TLK: CPT | Performed by: NURSE PRACTITIONER

## 2025-08-28 PROCEDURE — 3017F COLORECTAL CA SCREEN DOC REV: CPT | Performed by: NURSE PRACTITIONER

## 2025-08-28 PROCEDURE — G8417 CALC BMI ABV UP PARAM F/U: HCPCS | Performed by: NURSE PRACTITIONER

## 2025-08-28 PROCEDURE — 99214 OFFICE O/P EST MOD 30 MIN: CPT | Performed by: NURSE PRACTITIONER

## 2025-08-28 PROCEDURE — G8427 DOCREV CUR MEDS BY ELIG CLIN: HCPCS | Performed by: NURSE PRACTITIONER

## 2025-08-28 RX ORDER — DEXTROAMPHETAMINE SACCHARATE, AMPHETAMINE ASPARTATE MONOHYDRATE, DEXTROAMPHETAMINE SULFATE AND AMPHETAMINE SULFATE 5; 5; 5; 5 MG/1; MG/1; MG/1; MG/1
20 CAPSULE, EXTENDED RELEASE ORAL DAILY
Qty: 30 CAPSULE | Refills: 0 | Status: SHIPPED | OUTPATIENT
Start: 2025-10-27 | End: 2025-11-26

## 2025-08-28 RX ORDER — LORAZEPAM 0.5 MG/1
0.5 TABLET ORAL EVERY 12 HOURS PRN
Qty: 60 TABLET | Refills: 2 | Status: SHIPPED | OUTPATIENT
Start: 2025-08-28 | End: 2025-11-26

## 2025-08-28 RX ORDER — DEXTROAMPHETAMINE SACCHARATE, AMPHETAMINE ASPARTATE MONOHYDRATE, DEXTROAMPHETAMINE SULFATE AND AMPHETAMINE SULFATE 5; 5; 5; 5 MG/1; MG/1; MG/1; MG/1
20 CAPSULE, EXTENDED RELEASE ORAL DAILY
Qty: 30 CAPSULE | Refills: 0 | Status: SHIPPED | OUTPATIENT
Start: 2025-08-28 | End: 2025-09-27

## 2025-08-28 RX ORDER — DEXTROAMPHETAMINE SACCHARATE, AMPHETAMINE ASPARTATE MONOHYDRATE, DEXTROAMPHETAMINE SULFATE AND AMPHETAMINE SULFATE 5; 5; 5; 5 MG/1; MG/1; MG/1; MG/1
20 CAPSULE, EXTENDED RELEASE ORAL DAILY
Qty: 30 CAPSULE | Refills: 0 | Status: SHIPPED | OUTPATIENT
Start: 2025-09-27 | End: 2025-10-27

## 2025-08-28 RX ORDER — BUPROPION HYDROCHLORIDE 75 MG/1
75 TABLET ORAL 2 TIMES DAILY
Qty: 60 TABLET | Refills: 3 | Status: SHIPPED | OUTPATIENT
Start: 2025-08-28

## 2025-08-28 ASSESSMENT — ANXIETY QUESTIONNAIRES
2. NOT BEING ABLE TO STOP OR CONTROL WORRYING: NEARLY EVERY DAY
GAD7 TOTAL SCORE: 16
5. BEING SO RESTLESS THAT IT IS HARD TO SIT STILL: MORE THAN HALF THE DAYS
1. FEELING NERVOUS, ANXIOUS, OR ON EDGE: NEARLY EVERY DAY
3. WORRYING TOO MUCH ABOUT DIFFERENT THINGS: NEARLY EVERY DAY
6. BECOMING EASILY ANNOYED OR IRRITABLE: MORE THAN HALF THE DAYS
IF YOU CHECKED OFF ANY PROBLEMS ON THIS QUESTIONNAIRE, HOW DIFFICULT HAVE THESE PROBLEMS MADE IT FOR YOU TO DO YOUR WORK, TAKE CARE OF THINGS AT HOME, OR GET ALONG WITH OTHER PEOPLE: EXTREMELY DIFFICULT
4. TROUBLE RELAXING: NEARLY EVERY DAY
7. FEELING AFRAID AS IF SOMETHING AWFUL MIGHT HAPPEN: NOT AT ALL

## 2025-08-28 ASSESSMENT — COLUMBIA-SUICIDE SEVERITY RATING SCALE - C-SSRS
1. WITHIN THE PAST MONTH, HAVE YOU WISHED YOU WERE DEAD OR WISHED YOU COULD GO TO SLEEP AND NOT WAKE UP?: NO
6. HAVE YOU EVER DONE ANYTHING, STARTED TO DO ANYTHING, OR PREPARED TO DO ANYTHING TO END YOUR LIFE?: NO
2. HAVE YOU ACTUALLY HAD ANY THOUGHTS OF KILLING YOURSELF?: NO

## 2025-08-28 ASSESSMENT — PATIENT HEALTH QUESTIONNAIRE - PHQ9
6. FEELING BAD ABOUT YOURSELF - OR THAT YOU ARE A FAILURE OR HAVE LET YOURSELF OR YOUR FAMILY DOWN: SEVERAL DAYS
2. FEELING DOWN, DEPRESSED OR HOPELESS: NEARLY EVERY DAY
10. IF YOU CHECKED OFF ANY PROBLEMS, HOW DIFFICULT HAVE THESE PROBLEMS MADE IT FOR YOU TO DO YOUR WORK, TAKE CARE OF THINGS AT HOME, OR GET ALONG WITH OTHER PEOPLE: EXTREMELY DIFFICULT
5. POOR APPETITE OR OVEREATING: SEVERAL DAYS
SUM OF ALL RESPONSES TO PHQ QUESTIONS 1-9: 20
8. MOVING OR SPEAKING SO SLOWLY THAT OTHER PEOPLE COULD HAVE NOTICED. OR THE OPPOSITE, BEING SO FIGETY OR RESTLESS THAT YOU HAVE BEEN MOVING AROUND A LOT MORE THAN USUAL: MORE THAN HALF THE DAYS
9. THOUGHTS THAT YOU WOULD BE BETTER OFF DEAD, OR OF HURTING YOURSELF: SEVERAL DAYS
7. TROUBLE CONCENTRATING ON THINGS, SUCH AS READING THE NEWSPAPER OR WATCHING TELEVISION: NEARLY EVERY DAY
3. TROUBLE FALLING OR STAYING ASLEEP: NEARLY EVERY DAY
4. FEELING TIRED OR HAVING LITTLE ENERGY: NEARLY EVERY DAY
SUM OF ALL RESPONSES TO PHQ QUESTIONS 1-9: 20
SUM OF ALL RESPONSES TO PHQ QUESTIONS 1-9: 20
SUM OF ALL RESPONSES TO PHQ QUESTIONS 1-9: 19
1. LITTLE INTEREST OR PLEASURE IN DOING THINGS: NEARLY EVERY DAY

## (undated) DEVICE — FORCEPS BX L240CM WRK CHN 2.8MM STD CAP W/ NDL MIC MESH

## (undated) DEVICE — MOUTHPIECE ENDOSCP L CTRL OPN AND SIDE PORTS DISP

## (undated) DEVICE — BW-412T DISP COMBO CLEANING BRUSH: Brand: SINGLE USE COMBINATION CLEANING BRUSH

## (undated) DEVICE — SET VLV 3 PC AWS DISPOSABLE GRDIAN SCOPEVALET

## (undated) DEVICE — SOLUTION IV IRRIG WATER 500ML POUR BRL ST 2F7113

## (undated) DEVICE — PROCEDURE KIT ENDOSCP CUST